# Patient Record
Sex: FEMALE | Race: WHITE | NOT HISPANIC OR LATINO | Employment: OTHER | URBAN - METROPOLITAN AREA
[De-identification: names, ages, dates, MRNs, and addresses within clinical notes are randomized per-mention and may not be internally consistent; named-entity substitution may affect disease eponyms.]

---

## 2017-01-24 ENCOUNTER — PATIENT OUTREACH (OUTPATIENT)
Dept: ADMINISTRATIVE | Facility: HOSPITAL | Age: 52
End: 2017-01-24

## 2017-01-24 NOTE — PROGRESS NOTES
> 1 year since last ov.    Due for an office visit with her, your cholesterol labs, mammogram, pap smear, colon cancer screening, and possibly flu and tetanus immunizations

## 2017-01-24 NOTE — LETTER
February 1, 2017    Glendy Rays  [address] [place] [address] [address] [place] [address]             [facility]  [address]  [place] [address]  Phone: [phone] Dear Mrs. Andrade:    We have tried to reach you by mychart unsuccessfully.    [facility] is committed to your overall health.  To help you get the most out of each of your visits, we will review your information to make sure you are up to date on all of your recommended tests and/or procedures.  We have [facility] your primary care provider.     If Mrs. Valenzuela is no longer your primary care provider, please contact me so that we may update our records accordingly.       She has found that you may be due for an office visit with her, your cholesterol labs, mammogram, pap smear, colon cancer screening, and possibly flu and tetanus immunizations .     If you have had any of the above done at an outside facility, please let us know so I can update your record.  If you have a copy of these records, please provide a copy for us to scan into your chart.  If not, please provide that provider/facilities contact information so that we may obtain copies from that facility.     Otherwise, please schedule these appointments at your earliest convenience.     If you have any questions or concerns, please don't hesitate to call.    Thank you for letting us care for you,  Noemi Blackburn LPN Clinical Care Coordinator  [facility]  [phone]

## 2018-03-19 ENCOUNTER — OFFICE VISIT (OUTPATIENT)
Dept: FAMILY MEDICINE | Facility: CLINIC | Age: 53
End: 2018-03-19
Payer: COMMERCIAL

## 2018-03-19 VITALS
WEIGHT: 134.06 LBS | HEART RATE: 72 BPM | TEMPERATURE: 98 F | BODY MASS INDEX: 22.89 KG/M2 | HEIGHT: 64 IN | DIASTOLIC BLOOD PRESSURE: 70 MMHG | RESPIRATION RATE: 12 BRPM | SYSTOLIC BLOOD PRESSURE: 120 MMHG

## 2018-03-19 DIAGNOSIS — Z23 IMMUNIZATION DUE: ICD-10-CM

## 2018-03-19 DIAGNOSIS — Z12.4 PAP SMEAR FOR CERVICAL CANCER SCREENING: ICD-10-CM

## 2018-03-19 DIAGNOSIS — Z12.11 COLON CANCER SCREENING: ICD-10-CM

## 2018-03-19 DIAGNOSIS — Z12.39 BREAST CANCER SCREENING: ICD-10-CM

## 2018-03-19 DIAGNOSIS — R10.9 FLANK PAIN: ICD-10-CM

## 2018-03-19 DIAGNOSIS — Z01.419 WELL WOMAN EXAM WITH ROUTINE GYNECOLOGICAL EXAM: Primary | ICD-10-CM

## 2018-03-19 DIAGNOSIS — Z00.00 LABORATORY EXAM ORDERED AS PART OF ROUTINE GENERAL MEDICAL EXAMINATION: ICD-10-CM

## 2018-03-19 LAB
BILIRUB SERPL-MCNC: ABNORMAL MG/DL
BLOOD URINE, POC: 250
COLOR, POC UA: YELLOW
GLUCOSE UR QL STRIP: NORMAL
KETONES UR QL STRIP: ABNORMAL
LEUKOCYTE ESTERASE URINE, POC: ABNORMAL
NITRITE, POC UA: ABNORMAL
PH, POC UA: 9
PROTEIN, POC: ABNORMAL
SPECIFIC GRAVITY, POC UA: ABNORMAL
UROBILINOGEN, POC UA: 1

## 2018-03-19 PROCEDURE — 87624 HPV HI-RISK TYP POOLED RSLT: CPT

## 2018-03-19 PROCEDURE — 81002 URINALYSIS NONAUTO W/O SCOPE: CPT | Mod: S$GLB,,, | Performed by: NURSE PRACTITIONER

## 2018-03-19 PROCEDURE — 88175 CYTOPATH C/V AUTO FLUID REDO: CPT

## 2018-03-19 PROCEDURE — 90715 TDAP VACCINE 7 YRS/> IM: CPT | Mod: S$GLB,,, | Performed by: INTERNAL MEDICINE

## 2018-03-19 PROCEDURE — 99396 PREV VISIT EST AGE 40-64: CPT | Mod: 25,S$GLB,, | Performed by: NURSE PRACTITIONER

## 2018-03-19 PROCEDURE — 90471 IMMUNIZATION ADMIN: CPT | Mod: S$GLB,,, | Performed by: INTERNAL MEDICINE

## 2018-03-19 RX ORDER — DEXTROAMPHETAMINE SACCHARATE, AMPHETAMINE ASPARTATE, DEXTROAMPHETAMINE SULFATE AND AMPHETAMINE SULFATE 3.75; 3.75; 3.75; 3.75 MG/1; MG/1; MG/1; MG/1
15 TABLET ORAL 2 TIMES DAILY
Refills: 0 | COMMUNITY
Start: 2018-01-26 | End: 2019-06-03 | Stop reason: ALTCHOICE

## 2018-03-19 RX ORDER — SERTRALINE HYDROCHLORIDE 25 MG/1
25 TABLET, FILM COATED ORAL DAILY
Refills: 2 | COMMUNITY
Start: 2018-01-19 | End: 2019-05-28

## 2018-03-19 NOTE — PROGRESS NOTES
"Subjective:       Patient ID: Glendy Andrade is a 52 y.o. female.    Chief Complaint: Gynecologic Exam    HPI here for well woman exam. Last one was in 2013 with normal results. She denies any concerns today. She is due for mammogram. She has not had labs done since 2015. She has not had a colonoscopy done. She is having some mild left sided flank soreness. She states worse when she first gets up in the morning. She denies any dysuria, urgency or frequency. States she has not been hydrating well lately and feels that could be the issue. Denies increased pain with movement. See ROS.    The following portion of the patients history was reviewed and updated as appropriate: allergies, current medications, past medical and surgical history. Past social history and problem list reviewed. Family PMH and Past social history reviewed. Tobacco, Illicit drug use reviewed.     Review of Systems   Constitutional: No fatigue. No fever or night sweats.  HENT:   Head: Normocephalic.   Eyes: No vision changes.  Neck: No fullness or difficulty swallowing  Cardiovascular: No CP, palpitations.    Pulmonary/Chest:No SOB or wheezing   Abdominal: No N/V/D. No abdominal pain.    Pelvic:No lesions. No pain with intercourse. No abnormal bleeding. No discharge or odor.  Breasts: no skin changes. No masses or lesions. No nipple induration or discharge  Musculoskeletal: No joint pain or swelling. No change in gait. Left flank soreness.  Neurological: No dizziness. No headache.    Skin: No rashes or lesions   Psychiatric: No sadness, thoughts of suicide.      Objective:     /70   Pulse 72   Temp 98.3 °F (36.8 °C) (Oral)   Resp 12   Ht 5' 4" (1.626 m)   Wt 60.8 kg (134 lb 0.6 oz)   BMI 23.01 kg/m²      Physical Exam   Abdominal: Hernia confirmed negative in the right inguinal area and confirmed negative in the left inguinal area.   Genitourinary: Pelvic exam was performed with patient supine. There is no rash, tenderness or lesion on " the right labia. There is no rash, tenderness or lesion on the left labia. Uterus is not deviated, not enlarged, not fixed and not tender. Cervix exhibits no motion tenderness, no discharge and no friability. Right adnexum displays no mass, no tenderness and no fullness. Left adnexum displays no mass, no tenderness and no fullness. No erythema, tenderness or bleeding in the vagina. No vaginal discharge found.   Lymphadenopathy:        Right: No inguinal adenopathy present.        Left: No inguinal adenopathy present.       Constitutional: oriented to person, place, and time. well-developed and well-nourished.   Neck: Normal range of motion. Neck supple. No tracheal deviation present. No thyromegaly present.   Cardiovascular: Normal rate, regular rhythm and normal heart sounds.    Pulmonary/Chest: Effort normal and breath sounds normal. No respiratory distress. No wheezes.   Abdominal: Soft. Bowel sounds are normal. No distension. There is no tenderness.   Musculoskeletal: Normal range of motion. Gait and coordination normal. No pain with percussion over flank area.  Breasts: symmetrical. No masses or lesions. No nipple induration or discharge.   Neurological: oriented to person, place, and time.   Skin: Skin is warm and dry. No rashes or lesions  Psychiatric: Normal mood and affect.Behavior is normal. Judgment and thought content normal.     Assessment:       1. Well woman exam with routine gynecological exam    2. Flank pain    3. Pap smear for cervical cancer screening    4. Breast cancer screening    5. Laboratory exam ordered as part of routine general medical examination    6. Colon cancer screening    7. Immunization due        Plan:         Glendy was seen today for gynecologic exam.    Diagnoses and all orders for this visit:    Well woman exam with routine gynecological exam    Flank pain: she will hydrate well. If not improving will follow up.  -     POCT    Results for orders placed or performed in visit  on 03/19/18   POCT urine dipstick without microscope   Result Value Ref Range    Color, UA yellow     Spec Grav UA      pH, UA 9     WBC, UA neg     Nitrite, UA neg     Protein neg     Glucose, UA normal     Ketones, UA trace     Urobilinogen, UA 1     Bilirubin +     Blood,     urine dipstick without microscope    Pap smear for cervical cancer screening  -     Liquid-based pap smear, screening  -     HPV High Risk Genotypes, PCR    Breast cancer screening: due for routine screening.   -     Mammo Digital Screening Bilat with CAD; Future    Laboratory exam ordered as part of routine general medical examination  -     CBC auto differential; Future  -     Comprehensive metabolic panel; Future  -     Lipid panel; Future    Colon cancer screening  -     Case request GI: COLONOSCOPY    Immunization due  -     Tdap Vaccine    Continue current medication  Take medications only as prescribed  Healthy diet, exercise  Adequate rest  Adequate hydration  Avoid allergens  Avoid excessive caffeine

## 2018-03-23 LAB
HPV16 AG SPEC QL: NEGATIVE
HPV16+18+H RISK 12 DNA CVX-IMP: POSITIVE
HPV18 DNA SPEC QL NAA+PROBE: NEGATIVE

## 2018-03-26 ENCOUNTER — LAB VISIT (OUTPATIENT)
Dept: LAB | Facility: HOSPITAL | Age: 53
End: 2018-03-26
Payer: COMMERCIAL

## 2018-03-26 DIAGNOSIS — Z00.00 LABORATORY EXAM ORDERED AS PART OF ROUTINE GENERAL MEDICAL EXAMINATION: ICD-10-CM

## 2018-03-26 LAB
ALBUMIN SERPL BCP-MCNC: 3.9 G/DL
ALP SERPL-CCNC: 71 U/L
ALT SERPL W/O P-5'-P-CCNC: 22 U/L
ANION GAP SERPL CALC-SCNC: 7 MMOL/L
AST SERPL-CCNC: 24 U/L
BASOPHILS # BLD AUTO: 0.04 K/UL
BASOPHILS NFR BLD: 0.8 %
BILIRUB SERPL-MCNC: 0.6 MG/DL
BUN SERPL-MCNC: 16 MG/DL
CALCIUM SERPL-MCNC: 9.6 MG/DL
CHLORIDE SERPL-SCNC: 102 MMOL/L
CHOLEST SERPL-MCNC: 238 MG/DL
CHOLEST/HDLC SERPL: 2.5 {RATIO}
CO2 SERPL-SCNC: 30 MMOL/L
CREAT SERPL-MCNC: 0.8 MG/DL
DIFFERENTIAL METHOD: ABNORMAL
EOSINOPHIL # BLD AUTO: 0.1 K/UL
EOSINOPHIL NFR BLD: 2.5 %
ERYTHROCYTE [DISTWIDTH] IN BLOOD BY AUTOMATED COUNT: 13 %
EST. GFR  (AFRICAN AMERICAN): >60 ML/MIN/1.73 M^2
EST. GFR  (NON AFRICAN AMERICAN): >60 ML/MIN/1.73 M^2
GLUCOSE SERPL-MCNC: 89 MG/DL
HCT VFR BLD AUTO: 42.5 %
HDLC SERPL-MCNC: 95 MG/DL
HDLC SERPL: 39.9 %
HGB BLD-MCNC: 13.9 G/DL
IMM GRANULOCYTES # BLD AUTO: 0.01 K/UL
IMM GRANULOCYTES NFR BLD AUTO: 0.2 %
LDLC SERPL CALC-MCNC: 128.8 MG/DL
LYMPHOCYTES # BLD AUTO: 1.6 K/UL
LYMPHOCYTES NFR BLD: 34 %
MCH RBC QN AUTO: 31.2 PG
MCHC RBC AUTO-ENTMCNC: 32.7 G/DL
MCV RBC AUTO: 96 FL
MONOCYTES # BLD AUTO: 0.5 K/UL
MONOCYTES NFR BLD: 11.2 %
NEUTROPHILS # BLD AUTO: 2.4 K/UL
NEUTROPHILS NFR BLD: 51.3 %
NONHDLC SERPL-MCNC: 143 MG/DL
NRBC BLD-RTO: 0 /100 WBC
PLATELET # BLD AUTO: 248 K/UL
PMV BLD AUTO: 11.1 FL
POTASSIUM SERPL-SCNC: 5 MMOL/L
PROT SERPL-MCNC: 6.9 G/DL
RBC # BLD AUTO: 4.45 M/UL
SODIUM SERPL-SCNC: 139 MMOL/L
TRIGL SERPL-MCNC: 71 MG/DL
WBC # BLD AUTO: 4.74 K/UL

## 2018-03-26 PROCEDURE — 80053 COMPREHEN METABOLIC PANEL: CPT

## 2018-03-26 PROCEDURE — 80061 LIPID PANEL: CPT

## 2018-03-26 PROCEDURE — 85025 COMPLETE CBC W/AUTO DIFF WBC: CPT

## 2018-03-26 PROCEDURE — 36415 COLL VENOUS BLD VENIPUNCTURE: CPT | Mod: PN

## 2018-03-27 ENCOUNTER — PATIENT MESSAGE (OUTPATIENT)
Dept: FAMILY MEDICINE | Facility: CLINIC | Age: 53
End: 2018-03-27

## 2018-03-27 NOTE — TELEPHONE ENCOUNTER
Her HPV testing came back positive for high risk HPV. But her pap was normal. No cancer cells noted. She will need to have her pap done again in ONE year to evaluate for resolution of the HPV virus.

## 2018-03-28 ENCOUNTER — HOSPITAL ENCOUNTER (OUTPATIENT)
Dept: RADIOLOGY | Facility: HOSPITAL | Age: 53
Discharge: HOME | End: 2018-03-28
Attending: NURSE PRACTITIONER
Payer: COMMERCIAL

## 2018-03-28 DIAGNOSIS — Z12.39 BREAST CANCER SCREENING: ICD-10-CM

## 2018-03-28 PROCEDURE — 77067 SCR MAMMO BI INCL CAD: CPT | Mod: 26,,, | Performed by: RADIOLOGY

## 2018-03-28 PROCEDURE — 77067 SCR MAMMO BI INCL CAD: CPT | Mod: TC,PO

## 2018-03-28 PROCEDURE — 77063 BREAST TOMOSYNTHESIS BI: CPT | Mod: 26,,, | Performed by: RADIOLOGY

## 2018-03-29 ENCOUNTER — PATIENT MESSAGE (OUTPATIENT)
Dept: FAMILY MEDICINE | Facility: CLINIC | Age: 53
End: 2018-03-29

## 2018-04-05 ENCOUNTER — PATIENT MESSAGE (OUTPATIENT)
Dept: GASTROENTEROLOGY | Facility: CLINIC | Age: 53
End: 2018-04-05

## 2018-06-05 ENCOUNTER — PATIENT OUTREACH (OUTPATIENT)
Dept: ADMINISTRATIVE | Facility: HOSPITAL | Age: 53
End: 2018-06-05

## 2019-05-28 ENCOUNTER — OFFICE VISIT (OUTPATIENT)
Dept: FAMILY MEDICINE | Facility: CLINIC | Age: 54
End: 2019-05-28
Payer: COMMERCIAL

## 2019-05-28 VITALS
TEMPERATURE: 98 F | RESPIRATION RATE: 18 BRPM | WEIGHT: 131.19 LBS | HEART RATE: 64 BPM | OXYGEN SATURATION: 98 % | SYSTOLIC BLOOD PRESSURE: 104 MMHG | HEIGHT: 64 IN | BODY MASS INDEX: 22.4 KG/M2 | DIASTOLIC BLOOD PRESSURE: 60 MMHG

## 2019-05-28 DIAGNOSIS — Z12.39 BREAST CANCER SCREENING: ICD-10-CM

## 2019-05-28 DIAGNOSIS — Z01.419 WELL WOMAN EXAM WITH ROUTINE GYNECOLOGICAL EXAM: Primary | ICD-10-CM

## 2019-05-28 DIAGNOSIS — Z00.00 LABORATORY EXAM ORDERED AS PART OF ROUTINE GENERAL MEDICAL EXAMINATION: ICD-10-CM

## 2019-05-28 DIAGNOSIS — B97.7 HIGH RISK HPV INFECTION: ICD-10-CM

## 2019-05-28 DIAGNOSIS — Z12.4 CERVICAL CANCER SCREENING: ICD-10-CM

## 2019-05-28 PROCEDURE — 99396 PREV VISIT EST AGE 40-64: CPT | Mod: S$GLB,,, | Performed by: NURSE PRACTITIONER

## 2019-05-28 PROCEDURE — 88175 CYTOPATH C/V AUTO FLUID REDO: CPT

## 2019-05-28 PROCEDURE — 99396 PR PREVENTIVE VISIT,EST,40-64: ICD-10-PCS | Mod: S$GLB,,, | Performed by: NURSE PRACTITIONER

## 2019-05-28 PROCEDURE — 87624 HPV HI-RISK TYP POOLED RSLT: CPT

## 2019-05-28 NOTE — PROGRESS NOTES
Subjective:       Patient ID: Glendy Andrade is a 53 y.o. female.    Chief Complaint: Annual Exam (Physical)    HPI here for annual exam. Her last pap was a year ago which was normal except her HPV came back high risk. Will need to repeat that today. Discussed the concerns related to HPV. She is due for mammogram and labs. Has been under more stress lately. Her father passed away and her mother and both her inlaws have moved in with them. They had to sell their house and get a bigger one. She has not worked in about 4 months due to this so she has not been taking any adderall. Only takes this when she works. She has some issues with sleep, but melatonin usually helps, if not will take a Tylenol PM. She denies any other concerns. See ROS.    The following portion of the patients history was reviewed and updated as appropriate: allergies, current medications, past medical and surgical history. Past social history and problem list reviewed. Family PMH and Past social history reviewed. Tobacco, Illicit drug use reviewed.     Review of Systems   Constitutional: Negative for appetite change, fatigue and fever.   HENT: Negative for congestion and voice change.    Eyes: Negative for visual disturbance.   Respiratory: Negative for cough, chest tightness, shortness of breath and wheezing.    Cardiovascular: Negative for chest pain, palpitations and leg swelling.   Gastrointestinal: Negative for abdominal pain, blood in stool, diarrhea, nausea and vomiting.   Genitourinary: Negative for difficulty urinating, dysuria, pelvic pain, vaginal bleeding, vaginal discharge and vaginal pain.   Musculoskeletal: Negative for arthralgias, back pain, gait problem and myalgias.   Skin: Negative for rash and wound.   Allergic/Immunologic: Negative for environmental allergies and food allergies.   Neurological: Negative for dizziness, weakness and headaches.   Hematological: Negative for adenopathy. Does not bruise/bleed easily.  "  Psychiatric/Behavioral: Positive for sleep disturbance (sometimes. take melatonin or tylenol PM and that helps.). Negative for decreased concentration and dysphoric mood. The patient is not nervous/anxious.        Objective:     /60   Pulse 64   Temp 98.1 °F (36.7 °C) (Oral)   Resp 18   Ht 5' 4" (1.626 m)   Wt 59.5 kg (131 lb 3.2 oz)   SpO2 98%   BMI 22.52 kg/m²      Physical Exam   Constitutional: She is oriented to person, place, and time. She appears well-developed and well-nourished. No distress.   HENT:   Head: Normocephalic.   Eyes: Pupils are equal, round, and reactive to light. Conjunctivae and EOM are normal.   Neck: Trachea normal and normal range of motion. Neck supple. No tracheal tenderness present. No tracheal deviation and no edema present. No thyromegaly present.   Cardiovascular: Regular rhythm and normal heart sounds. Exam reveals no gallop.   No murmur heard.  Pulmonary/Chest: Breath sounds normal. No stridor. No respiratory distress. She has no wheezes. She has no rhonchi. She has no rales.   Abdominal: Soft. Normal appearance and bowel sounds are normal. She exhibits no mass. There is no splenomegaly. There is no tenderness. There is no rebound. Hernia confirmed negative in the right inguinal area and confirmed negative in the left inguinal area.   Genitourinary: Vagina normal. There is no rash, tenderness or lesion on the right labia. There is no rash, tenderness or lesion on the left labia. Uterus is not deviated, not enlarged, not fixed and not tender. Cervix exhibits no motion tenderness, no discharge and no friability. Right adnexum displays no mass, no tenderness and no fullness. Left adnexum displays no mass, no tenderness and no fullness. No erythema or tenderness in the vagina. No vaginal discharge found.   Musculoskeletal: Normal range of motion.   Gait and coordination normal   Lymphadenopathy: No inguinal adenopathy noted on the right or left side.   Neurological: She " is alert and oriented to person, place, and time. She has normal strength.   Skin: Skin is warm and dry. Capillary refill takes less than 2 seconds. No lesion and no rash noted.   Psychiatric: She has a normal mood and affect. Her speech is normal and behavior is normal. Judgment and thought content normal. Cognition and memory are normal.       Assessment:       1. Well woman exam with routine gynecological exam    2. Laboratory exam ordered as part of routine general medical examination    3. Cervical cancer screening    4. Breast cancer screening    5. High risk HPV infection        Plan:         Glendy was seen today for annual exam.    Diagnoses and all orders for this visit:    Well woman exam with routine gynecological exam    Laboratory exam ordered as part of routine general medical examination: due for routine labs.  -     CBC auto differential; Future  -     Comprehensive metabolic panel; Future  -     Lipid panel; Future    Cervical cancer screening: pap done.  -     Liquid-based pap smear, screening  -     HPV High Risk Genotypes, PCR    Breast cancer screening  -     Mammo Digital Screening Bilat; Future    High risk HPV infection: if this sample comes back again as positive for HPV, will need colposcopy by Gynecology.       Healthy diet, exercise  Adequate rest  Adequate hydration  Avoid allergens  Avoid excessive caffeine

## 2019-06-03 ENCOUNTER — OFFICE VISIT (OUTPATIENT)
Dept: SPINE | Facility: CLINIC | Age: 54
End: 2019-06-03
Payer: COMMERCIAL

## 2019-06-03 ENCOUNTER — HOSPITAL ENCOUNTER (OUTPATIENT)
Dept: RADIOLOGY | Facility: HOSPITAL | Age: 54
Discharge: HOME | End: 2019-06-03
Attending: PHYSICIAN ASSISTANT
Payer: COMMERCIAL

## 2019-06-03 VITALS
DIASTOLIC BLOOD PRESSURE: 69 MMHG | BODY MASS INDEX: 22.4 KG/M2 | HEIGHT: 64 IN | HEART RATE: 79 BPM | WEIGHT: 131.19 LBS | SYSTOLIC BLOOD PRESSURE: 120 MMHG | TEMPERATURE: 98 F | RESPIRATION RATE: 18 BRPM

## 2019-06-03 DIAGNOSIS — M54.2 CERVICALGIA: Primary | ICD-10-CM

## 2019-06-03 DIAGNOSIS — M54.2 CERVICALGIA: ICD-10-CM

## 2019-06-03 DIAGNOSIS — M47.812 SPONDYLOSIS OF CERVICAL REGION WITHOUT MYELOPATHY OR RADICULOPATHY: ICD-10-CM

## 2019-06-03 PROCEDURE — 72052 X-RAY EXAM NECK SPINE 6/>VWS: CPT | Mod: 26,,, | Performed by: RADIOLOGY

## 2019-06-03 PROCEDURE — 99999 PR PBB SHADOW E&M-EST. PATIENT-LVL III: ICD-10-PCS | Mod: PBBFAC,,, | Performed by: PHYSICIAN ASSISTANT

## 2019-06-03 PROCEDURE — 99203 OFFICE O/P NEW LOW 30 MIN: CPT | Mod: S$GLB,,, | Performed by: PHYSICIAN ASSISTANT

## 2019-06-03 PROCEDURE — 72052 X-RAY EXAM NECK SPINE 6/>VWS: CPT | Mod: TC,PO

## 2019-06-03 PROCEDURE — 3008F PR BODY MASS INDEX (BMI) DOCUMENTED: ICD-10-PCS | Mod: CPTII,S$GLB,, | Performed by: PHYSICIAN ASSISTANT

## 2019-06-03 PROCEDURE — 72052 XR CERVICAL SPINE 5 VIEW WITH FLEX AND EXT: ICD-10-PCS | Mod: 26,,, | Performed by: RADIOLOGY

## 2019-06-03 PROCEDURE — 3008F BODY MASS INDEX DOCD: CPT | Mod: CPTII,S$GLB,, | Performed by: PHYSICIAN ASSISTANT

## 2019-06-03 PROCEDURE — 99999 PR PBB SHADOW E&M-EST. PATIENT-LVL III: CPT | Mod: PBBFAC,,, | Performed by: PHYSICIAN ASSISTANT

## 2019-06-03 PROCEDURE — 99203 PR OFFICE/OUTPT VISIT, NEW, LEVL III, 30-44 MIN: ICD-10-PCS | Mod: S$GLB,,, | Performed by: PHYSICIAN ASSISTANT

## 2019-06-03 NOTE — LETTER
Elaine 3, 2019      Marilyn Valenzuela, AMERICO  [address] [facility] [address]  [address]  [facility] [address]           [facility]  [address] [facility] [address]  [facility] [address]  Phone: [phone]  Fax: [phone]          Patient: Glendy Andrade   MR Number: [id number]   YOB: 1965   Date of Visit: 6/3/2019       Dear Marilyn Valenzuela:    Thank you for referring Glendy Andrade to me for evaluation. Attached you will find relevant portions of my assessment and plan of care.    If you have questions, please do not hesitate to call me. I look forward to following Glendy Andrade along with you.    Sincerely,    Cortney Beauchamp PA-C    Enclosure  CC:  No Recipients    If you would like to receive this communication electronically, please contact [email or web address] or [phone] to request more information on [employer] Link access.    For providers and/or their staff who would like to refer a patient to [facility], please contact us through our one-stop-shop provider referral line, [facility], at [phone].    If you feel you have received this communication in error or would no longer like to receive these types of communications, please e-mail [email or web address]

## 2019-06-03 NOTE — PROGRESS NOTES
Back and Spine History & Physical    Patient ID: Glendy Andrade is a 53 y.o. female.    Chief Complaint   Patient presents with    Establish Care    Neck Pain       Review of Systems   Constitutional: Negative for activity change, appetite change, chills, fever and unexpected weight change.   HENT: Negative for tinnitus, trouble swallowing and voice change.    Respiratory: Negative for apnea, cough, chest tightness and shortness of breath.    Cardiovascular: Negative for chest pain and palpitations.   Gastrointestinal: Negative for constipation, diarrhea, nausea and vomiting.   Genitourinary: Negative for difficulty urinating, dysuria, frequency and urgency.   Musculoskeletal: Positive for neck pain. Negative for back pain, gait problem and neck stiffness.   Skin: Negative for wound.   Neurological: Positive for numbness. Negative for dizziness, tremors, seizures, facial asymmetry, speech difficulty, weakness, light-headedness and headaches.   Psychiatric/Behavioral: Negative for confusion and decreased concentration.       Past Medical History:   Diagnosis Date    ADD (attention deficit disorder)     Depression     High risk HPV infection     Hyperlipidemia      Social History     Socioeconomic History    Marital status:      Spouse name: Not on file    Number of children: Not on file    Years of education: Not on file    Highest education level: Not on file   Occupational History    Not on file   Social Needs    Financial resource strain: Not on file    Food insecurity:     Worry: Not on file     Inability: Not on file    Transportation needs:     Medical: Not on file     Non-medical: Not on file   Tobacco Use    Smoking status: Former Smoker     Last attempt to quit: 3/19/2016     Years since quitting: 3.2    Smokeless tobacco: Never Used   Substance and Sexual Activity    Alcohol use: Yes     Alcohol/week: 4.2 oz     Types: 7 Glasses of wine per week    Drug use: No    Sexual activity:  "Yes     Partners: Male     Birth control/protection: Post-menopausal, None   Lifestyle    Physical activity:     Days per week: Not on file     Minutes per session: Not on file    Stress: Not on file   Relationships    Social connections:     Talks on phone: Not on file     Gets together: Not on file     Attends [employer] service: Not on file     Active member of club or organization: Not on file     Attends meetings of clubs or organizations: Not on file     Relationship status: Not on file   Other Topics Concern    Not on file   Social History Narrative    Not on file     Family History   Problem Relation Age of Onset    Hypertension Father     Arthritis Maternal Grandmother     Diabetes Maternal Grandmother     Diabetes Paternal Grandmother      Review of patient's allergies indicates:  No Known Allergies  No current outpatient medications on file.    Vitals:    06/03/19 1442   BP: 120/69   Pulse: 79   Resp: 18   Temp: 97.6 °F (36.4 °C)   TempSrc: Oral   Weight: 59.5 kg (131 lb 2.8 oz)   Height: 5' 4" (1.626 m)       Physical Exam   Constitutional: She is oriented to person, place, and time. She appears well-developed and well-nourished.   HENT:   Head: Normocephalic and atraumatic.   Eyes: Pupils are equal, round, and reactive to light.   Neck: Normal range of motion. Neck supple.   Cardiovascular: Normal rate.   Pulmonary/Chest: Effort normal.   Musculoskeletal: Normal range of motion. She exhibits no edema.   Neurological: She is alert and oriented to person, place, and time. She has a normal Finger-Nose-Finger Test, a normal Heel to Shin Test, a normal Romberg Test and a normal Tandem Gait Test. Gait normal.   Reflex Scores:       Tricep reflexes are 2+ on the right side and 2+ on the left side.       Bicep reflexes are 2+ on the right side and 2+ on the left side.       Brachioradialis reflexes are 2+ on the right side and 2+ on the left side.       Patellar reflexes are 2+ on the right side and 2+ " on the left side.       Achilles reflexes are 2+ on the right side and 2+ on the left side.  Skin: Skin is warm, dry and intact.   Psychiatric: She has a normal mood and affect. Her speech is normal and behavior is normal. Judgment and thought content normal.   Nursing note and vitals reviewed.      Neurologic Exam     Mental Status   Oriented to person, place, and time.   Oriented to person.   Oriented to place.   Oriented to time.   Follows 3 step commands.   Attention: normal. Concentration: normal.   Speech: speech is normal   Level of consciousness: alert  Knowledge: consistent with education.   Able to name object. Able to read. Able to repeat. Able to write. Normal comprehension.     Cranial Nerves     CN II   Visual acuity: normal  Right visual field deficit: none  Left visual field deficit: none     CN III, IV, VI   Pupils are equal, round, and reactive to light.  Right pupil: Size: 3 mm. Shape: regular. Reactivity: brisk. Consensual response: intact.   Left pupil: Size: 3 mm. Shape: regular. Reactivity: brisk. Consensual response: intact.   CN III: no CN III palsy  CN VI: no CN VI palsy  Nystagmus: none   Diplopia: none  Ophthalmoparesis: none  Conjugate gaze: present    CN V   Right facial sensation deficit: none  Left facial sensation deficit: none    CN VII   Right facial weakness: none  Left facial weakness: none    CN VIII   Hearing: intact    CN IX, X   CN IX normal.   CN X normal.     CN XI   Right sternocleidomastoid strength: normal  Left sternocleidomastoid strength: normal  Right trapezius strength: normal  Left trapezius strength: normal    CN XII   Fasciculations: absent  Tongue deviation: none    Motor Exam   Muscle bulk: normal  Overall muscle tone: normal  Right arm pronator drift: absent  Left arm pronator drift: absent    Strength   Right neck flexion: 5/5  Left neck flexion: 5/5  Right neck extension: 5/5  Left neck extension: 5/5  Right deltoid: 5/5  Left deltoid: 5/5  Right biceps:  5/5  Left biceps: 5/5  Right triceps: 5/5  Left triceps: 5/5  Right wrist flexion: 5/5  Left wrist flexion: 5/5  Right wrist extension: 5/5  Left wrist extension: 5/5  Right interossei: 5/5  Left interossei: 5/5  Right abdominals: 5/5  Left abdominals: 5/5  Right iliopsoas: 5/  Left iliopsoas: 5  Right quadriceps: 5/5  Left quadriceps: 5/  Right hamstrin/  Left hamstrin  Right glutei: 5/  Left glutei: 5  Right anterior tibial: 5  Left anterior tibial:   Right posterior tibial:   Left posterior tibial:   Right peroneal:   Left peroneal:   Right gastroc: 5  Left gastroc:     Sensory Exam   Right arm light touch: normal  Left arm light touch: normal  Right leg light touch: normal  Left leg light touch: normal  Right arm vibration: normal  Left arm vibration: normal  Right arm pinprick: normal  Left arm pinprick: normal    Gait, Coordination, and Reflexes     Gait  Gait: normal    Coordination   Romberg: negative  Finger to nose coordination: normal  Heel to shin coordination: normal  Tandem walking coordination: normal    Tremor   Resting tremor: absent  Intention tremor: absent  Action tremor: absent    Reflexes   Right brachioradialis: 2+  Left brachioradialis: 2+  Right biceps: 2+  Left biceps: 2+  Right triceps: 2+  Left triceps: 2+  Right patellar: 2+  Left patellar: 2+  Right achilles: 2+  Left achilles: 2+  Right Burton: absent  Left Burton: absent  Right ankle clonus: absent  Left ankle clonus: absent      Provider dictation:  53 year old female with ADD and depression is self referred for evaluation of neck pain and arthritis.  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  Her mother and your sister have had cervical spine surgery.  She describes progressively increasing posterior neck pain radiating to the interscapular area to the shoulders and head triggering head aches.  She denies radicular symptoms into the arms, but has isolated numbness  in the right hand while doing a lot of work and upon waking in the morning .  She will occasionally take motrin for pain and uses a home tens unit.    NDI score: 24%.  PHQ:  5.    On exam, she sits with her neck slightly flexed forward.  She has a prominent hump on the posterior neck.  She has 5/5 strength with no sensory deficits.  2+ muscle stretch reflexes.    I have reviewed xrays of the cervical spine from 11-4-13 demonstrating disk height loss at C4/5, C5/6, C6/7.  Significant anterior vertebral endplate osteophytes are noted.  There is no evidwence of fracture.  Straightening of the cervical lordosis is seen.     Ms. Andrade has chronic neck pain and significant degenerative changes.  Neck pain can be do myofascial pain from straightening and referred from spondylosis.   I am referring her to PT for neck pain.  Right hand numbness more likely carpal tunnel than radiculopathy and she will start using wrist splints she has at home already.  Follow up if no improvement with PT and splinting.  We will also obtain updated neck xrays today to compare level of spondylosis now to 2013.    Visit Diagnosis:  Cervicalgia  -     X-Ray Cervical Spine 5 View W Flex Extxt; Future; Expected date: 06/03/2019    Spondylosis of cervical region without myelopathy or radiculopathy  -     X-Ray Cervical Spine 5 View W Flex Extxt; Future; Expected date: 06/03/2019        Total time spent counseling greater than fifty percent of total visit time.  Counseling included discussion regarding imaging findings, diagnosis possibilities, treatment options, risks and benefits.   The patient had many questions regarding the options and long-term effects.     Pattern 1

## 2019-06-04 ENCOUNTER — PATIENT MESSAGE (OUTPATIENT)
Dept: FAMILY MEDICINE | Facility: CLINIC | Age: 54
End: 2019-06-04

## 2019-06-04 LAB
HPV HR 12 DNA CVX QL NAA+PROBE: NEGATIVE
HPV16 AG SPEC QL: NEGATIVE
HPV18 DNA SPEC QL NAA+PROBE: NEGATIVE

## 2019-06-11 ENCOUNTER — HOSPITAL ENCOUNTER (OUTPATIENT)
Dept: RADIOLOGY | Facility: HOSPITAL | Age: 54
Discharge: HOME | End: 2019-06-11
Attending: NURSE PRACTITIONER
Payer: COMMERCIAL

## 2019-06-11 DIAGNOSIS — Z12.39 BREAST CANCER SCREENING: ICD-10-CM

## 2019-06-11 PROCEDURE — 77063 BREAST TOMOSYNTHESIS BI: CPT | Mod: 26,,, | Performed by: RADIOLOGY

## 2019-06-11 PROCEDURE — 77067 MAMMO DIGITAL SCREENING BILAT WITH TOMOSYNTHESIS_CAD: ICD-10-PCS | Mod: 26,,, | Performed by: RADIOLOGY

## 2019-06-11 PROCEDURE — 77063 MAMMO DIGITAL SCREENING BILAT WITH TOMOSYNTHESIS_CAD: ICD-10-PCS | Mod: 26,,, | Performed by: RADIOLOGY

## 2019-06-11 PROCEDURE — 77067 SCR MAMMO BI INCL CAD: CPT | Mod: 26,,, | Performed by: RADIOLOGY

## 2019-06-11 PROCEDURE — 77067 SCR MAMMO BI INCL CAD: CPT | Mod: TC,PO

## 2020-03-11 ENCOUNTER — TELEPHONE (OUTPATIENT)
Dept: FAMILY MEDICINE | Facility: CLINIC | Age: 55
End: 2020-03-11

## 2020-03-11 NOTE — TELEPHONE ENCOUNTER
No show letter #1 mailed to pt.  Also, called pt and left her a message to please return my call at [phone] to reschedule missed appt from today with Marilyn Valenzuela NP.

## 2020-06-22 ENCOUNTER — TELEPHONE (OUTPATIENT)
Dept: RHEUMATOLOGY | Facility: CLINIC | Age: 55
End: 2020-06-22

## 2020-06-22 NOTE — TELEPHONE ENCOUNTER
----- Message from Nori Sutton sent at 6/22/2020  8:59 AM CDT -----  Type:  Sooner Apoointment Request    Caller is requesting a sooner appointment.  Caller declined first available appointment listed below.  Caller will not accept being placed on the waitlist and is requesting a message be sent to doctor.    Name of Caller:  self   When is the first available appointment?   Symptoms:   Best Call Back Number: [phone]   Additional Information:  Patient requesting to schedule a new patient appointment for spurs on the spine-[place]

## 2020-06-22 NOTE — TELEPHONE ENCOUNTER
Returned patient call regarding new patient appointment. Patient stated was told by eye doctor that MRI showed issues with Spine possible DISH. Nurse informed of first available with both providers. Nurse offered to provide phone numbers to other [facility] facilities. Patient stated will take first available. Nurse scheduled appointment with Dr Estrella and added to wait list. Patient aware of date and time of appointment.

## 2020-06-26 ENCOUNTER — TELEPHONE (OUTPATIENT)
Dept: FAMILY MEDICINE | Facility: CLINIC | Age: 55
End: 2020-06-26

## 2020-06-26 DIAGNOSIS — Z20.822 CLOSE EXPOSURE TO COVID-19 VIRUS: Primary | ICD-10-CM

## 2020-06-26 NOTE — TELEPHONE ENCOUNTER
Patient requesting COVID testing due to close exposure to her COVID positive mother. Please advise. Patient is currently quarantining and states she has been coughing and had changes in taste.

## 2020-06-26 NOTE — TELEPHONE ENCOUNTER
I put in the order. She has to go to [facility] [address]  [facility] to [facility].  They will swab her. Wear a mask.

## 2020-06-26 NOTE — TELEPHONE ENCOUNTER
----- Message from Venita Natarajan sent at 6/26/2020  8:11 AM CDT -----  Contact: Patient  Type: Needs Medical Advice  Who Called:  Patient  Best Call Back Number:   Additional Information: Calling to find out if she can get orders for covid test.

## 2020-07-10 ENCOUNTER — OFFICE VISIT (OUTPATIENT)
Dept: URGENT CARE | Facility: CLINIC | Age: 55
End: 2020-07-10
Payer: COMMERCIAL

## 2020-07-10 VITALS
DIASTOLIC BLOOD PRESSURE: 72 MMHG | HEART RATE: 86 BPM | RESPIRATION RATE: 18 BRPM | TEMPERATURE: 98 F | HEIGHT: 64 IN | WEIGHT: 131.19 LBS | OXYGEN SATURATION: 98 % | BODY MASS INDEX: 22.4 KG/M2 | SYSTOLIC BLOOD PRESSURE: 116 MMHG

## 2020-07-10 DIAGNOSIS — Z01.84 ENCOUNTER FOR ANTIBODY RESPONSE EXAMINATION: Primary | ICD-10-CM

## 2020-07-10 LAB — SARS-COV-2 IGG SERPLBLD QL IA.RAPID: NEGATIVE

## 2020-07-10 PROCEDURE — 99211 OFF/OP EST MAY X REQ PHY/QHP: CPT | Mod: S$GLB,,, | Performed by: STUDENT IN AN ORGANIZED HEALTH CARE EDUCATION/TRAINING PROGRAM

## 2020-07-10 PROCEDURE — 86769 SARS-COV-2 COVID-19 ANTIBODY: CPT

## 2020-07-10 PROCEDURE — 99211 PR OFFICE/OUTPT VISIT, EST, LEVL I: ICD-10-PCS | Mod: S$GLB,,, | Performed by: STUDENT IN AN ORGANIZED HEALTH CARE EDUCATION/TRAINING PROGRAM

## 2020-07-10 NOTE — PROGRESS NOTES
"Subjective:       Patient ID: Glendy Andrade is a 54 y.o. female.    Vitals:  height is 5' 4" (1.626 m) and weight is 59.5 kg (131 lb 2.8 oz). Her temperature is 97.5 °F (36.4 °C). Her blood pressure is 116/72 and her pulse is 86. Her respiration is 18.     Chief Complaint: No chief complaint on file.    Pt presents to the clinic for COVID antibody testing. Pt states that she has no symptoms. Had recent exposure 3wks ago when mother tested positive. Pt had nasal swab done after and was negative.      Constitution: Negative for chills, fatigue and fever.   HENT: Negative for congestion and sore throat.    Neck: Negative for painful lymph nodes.   Cardiovascular: Negative for chest pain and leg swelling.   Eyes: Negative for double vision and blurred vision.   Respiratory: Negative for cough and shortness of breath.    Gastrointestinal: Negative for nausea, vomiting and diarrhea.   Genitourinary: Negative for dysuria, frequency, urgency and history of kidney stones.   Musculoskeletal: Negative for joint pain, joint swelling, muscle cramps and muscle ache.   Skin: Negative for color change, pale, rash and bruising.   Allergic/Immunologic: Negative for seasonal allergies.   Neurological: Negative for dizziness, history of vertigo, light-headedness, passing out and headaches.   Hematologic/Lymphatic: Negative for swollen lymph nodes.   Psychiatric/Behavioral: Negative for nervous/anxious, sleep disturbance and depression. The patient is not nervous/anxious.        Objective:      Physical Exam   Nursing note and vitals reviewed.  Exam deferred in setting of COVID pandemic in asymptomatic patient with stable vitals      Assessment:       1. Encounter for antibody response examination        Plan:         Encounter for antibody response examination  -     COVID-19 (SARS CoV-2) IgG Antibody  - counseled patient and answered questions in regards to COVID-19 testing and diagnosis for 5 minutes    Tai Rojas MD/MPH  [facility]  [facility]

## 2020-07-10 NOTE — PATIENT INSTRUCTIONS
COVID antibody testing has been done today  A positive IgG antibody means you have previously had COVID-19 but does not imply immunity or current active infection. You should continue to take precautions as instructed by the CDC and [facility] to prevent infection and return to care for RNA testing if you become symptomatic for COVID.

## 2020-08-28 DIAGNOSIS — Z12.39 BREAST CANCER SCREENING: ICD-10-CM

## 2020-10-05 ENCOUNTER — PATIENT MESSAGE (OUTPATIENT)
Dept: ADMINISTRATIVE | Facility: HOSPITAL | Age: 55
End: 2020-10-05

## 2020-10-15 ENCOUNTER — HOSPITAL ENCOUNTER (OUTPATIENT)
Dept: RADIOLOGY | Facility: HOSPITAL | Age: 55
Discharge: HOME | End: 2020-10-15
Attending: INTERNAL MEDICINE
Payer: COMMERCIAL

## 2020-10-15 ENCOUNTER — OFFICE VISIT (OUTPATIENT)
Dept: RHEUMATOLOGY | Facility: CLINIC | Age: 55
End: 2020-10-15
Payer: COMMERCIAL

## 2020-10-15 VITALS
SYSTOLIC BLOOD PRESSURE: 109 MMHG | HEART RATE: 66 BPM | HEIGHT: 64 IN | WEIGHT: 128.31 LBS | DIASTOLIC BLOOD PRESSURE: 69 MMHG | BODY MASS INDEX: 21.91 KG/M2

## 2020-10-15 DIAGNOSIS — M54.9 DORSALGIA, UNSPECIFIED: ICD-10-CM

## 2020-10-15 DIAGNOSIS — M46.90 INFLAMMATORY SPONDYLOPATHY, UNSPECIFIED SPINAL REGION: ICD-10-CM

## 2020-10-15 DIAGNOSIS — M48.10 DISH (DIFFUSE IDIOPATHIC SKELETAL HYPEROSTOSIS): Primary | ICD-10-CM

## 2020-10-15 DIAGNOSIS — M48.10 DISH (DIFFUSE IDIOPATHIC SKELETAL HYPEROSTOSIS): ICD-10-CM

## 2020-10-15 PROCEDURE — 72110 X-RAY EXAM L-2 SPINE 4/>VWS: CPT | Mod: TC,FY,PO

## 2020-10-15 PROCEDURE — 72110 XR LUMBAR SPINE COMPLETE 5 VIEW: ICD-10-PCS | Mod: 26,,, | Performed by: RADIOLOGY

## 2020-10-15 PROCEDURE — 72070 X-RAY EXAM THORAC SPINE 2VWS: CPT | Mod: TC,FY,PO

## 2020-10-15 PROCEDURE — 72070 X-RAY EXAM THORAC SPINE 2VWS: CPT | Mod: 26,,, | Performed by: RADIOLOGY

## 2020-10-15 PROCEDURE — 99205 OFFICE O/P NEW HI 60 MIN: CPT | Mod: S$GLB,,, | Performed by: INTERNAL MEDICINE

## 2020-10-15 PROCEDURE — 99999 PR PBB SHADOW E&M-EST. PATIENT-LVL IV: CPT | Mod: PBBFAC,,, | Performed by: INTERNAL MEDICINE

## 2020-10-15 PROCEDURE — 3008F BODY MASS INDEX DOCD: CPT | Mod: CPTII,S$GLB,, | Performed by: INTERNAL MEDICINE

## 2020-10-15 PROCEDURE — 3008F PR BODY MASS INDEX (BMI) DOCUMENTED: ICD-10-PCS | Mod: CPTII,S$GLB,, | Performed by: INTERNAL MEDICINE

## 2020-10-15 PROCEDURE — 72200 X-RAY EXAM SI JOINTS: CPT | Mod: TC,FY,PO

## 2020-10-15 PROCEDURE — 72200 X-RAY EXAM SI JOINTS: CPT | Mod: 26,,, | Performed by: RADIOLOGY

## 2020-10-15 PROCEDURE — 72110 X-RAY EXAM L-2 SPINE 4/>VWS: CPT | Mod: 26,,, | Performed by: RADIOLOGY

## 2020-10-15 PROCEDURE — 72200 XR SACROILIAC JOINTS 3 VIEWS: ICD-10-PCS | Mod: 26,,, | Performed by: RADIOLOGY

## 2020-10-15 PROCEDURE — 99205 PR OFFICE/OUTPT VISIT, NEW, LEVL V, 60-74 MIN: ICD-10-PCS | Mod: S$GLB,,, | Performed by: INTERNAL MEDICINE

## 2020-10-15 PROCEDURE — 72070 XR THORACIC SPINE AP LATERAL: ICD-10-PCS | Mod: 26,,, | Performed by: RADIOLOGY

## 2020-10-15 PROCEDURE — 99999 PR PBB SHADOW E&M-EST. PATIENT-LVL IV: ICD-10-PCS | Mod: PBBFAC,,, | Performed by: INTERNAL MEDICINE

## 2020-10-15 RX ORDER — NAPROXEN 500 MG/1
500 TABLET ORAL 2 TIMES DAILY WITH MEALS
Qty: 60 TABLET | Refills: 2 | Status: SHIPPED | OUTPATIENT
Start: 2020-10-15 | End: 2020-12-14

## 2020-10-15 RX ORDER — BACLOFEN 10 MG/1
10 TABLET ORAL 3 TIMES DAILY
Qty: 90 TABLET | Refills: 6 | Status: SHIPPED | OUTPATIENT
Start: 2020-10-15 | End: 2022-01-14 | Stop reason: SDUPTHER

## 2020-10-15 RX ORDER — LEVOTHYROXINE SODIUM 125 UG/1
125 TABLET ORAL
COMMUNITY
Start: 2020-09-13 | End: 2022-03-21

## 2020-10-15 RX ORDER — AMOXICILLIN AND CLAVULANATE POTASSIUM 875; 125 MG/1; MG/1
TABLET, FILM COATED ORAL
COMMUNITY
Start: 2020-10-13 | End: 2020-12-07

## 2020-10-15 RX ORDER — TRAZODONE HYDROCHLORIDE 50 MG/1
50 TABLET ORAL NIGHTLY PRN
COMMUNITY
Start: 2020-08-01 | End: 2022-03-21

## 2020-10-15 ASSESSMENT — ROUTINE ASSESSMENT OF PATIENT INDEX DATA (RAPID3)
TOTAL RAPID3 SCORE: 2.67
PATIENT GLOBAL ASSESSMENT SCORE: 4
FATIGUE SCORE: 0
PAIN SCORE: 4
PSYCHOLOGICAL DISTRESS SCORE: 2.2
MDHAQ FUNCTION SCORE: 0

## 2020-10-15 NOTE — PROGRESS NOTES
"Subjective:          Chief Complaint: Glendy Andrade is a 54 y.o. female who had concerns including Disease Management.    HPI:  Patient is a 54-year-old female she was seen by a video visit during the COVID showed down for cervical pain lasting last 20 years.  She was also seen in 2019 with her back [facility] for similar complaint was found to have from a 2013 imaging disc height loss as well as C4 through C7 intervertebral osteophytosis she was recommended for physical therapy but was unable to attend consistently with work and caring for her mother.     Patient notes no recall of pain in teens and 20s. States she appreciated this about 30s with noted a stiffness in her cervical spine and some thoracic pain. Was told she had "some spurs". She notes stiffness of her cervical spine all the time.   She denies similar pain in the lumbar spine. She is a seamstress continues to work but notes with increased work it does get worse.   Right hand she notes intermittent in the 3rd 4th finger but that comes and goes. No weakness in UE and LE. No imbalance. No numbness.     She notes the pattern was predominantly PM, but waking stiff.   Has worked with chiropracter. Limited benefit  No other dactylitis. No Ps self of family. No IBD.     Mother with "spurs" , younger sister with spurs/arthritis.     Recent trial viibryd ASE-   Has been on Cymbalta- seems to have trouble with this class.     REVIEW OF SYSTEMS:    Review of Systems   Constitutional: Negative for fever, malaise/fatigue and weight loss.   HENT: Negative for sore throat.    Eyes: Negative for double vision, photophobia and redness.   Respiratory: Negative for cough, shortness of breath and wheezing.    Cardiovascular: Negative for chest pain, palpitations and orthopnea.   Gastrointestinal: Negative for abdominal pain, constipation and diarrhea.   Genitourinary: Negative for dysuria, hematuria and urgency.   Musculoskeletal: Positive for joint " pain and neck pain. Negative for back pain and myalgias.   Skin: Negative for rash.   Neurological: Negative for dizziness, tingling, focal weakness and headaches.   Endo/Heme/Allergies: Does not bruise/bleed easily.   Psychiatric/Behavioral: Negative for depression, hallucinations and suicidal ideas.               Objective:            Past Medical History:   Diagnosis Date    ADD (attention deficit disorder)     Depression     High risk HPV infection     Hyperlipidemia      Family History   Problem Relation Age of Onset    Hypertension Father     Arthritis Maternal Grandmother     Diabetes Maternal Grandmother     Diabetes Paternal Grandmother      Social History     Tobacco Use    Smoking status: Former Smoker     Quit date: 3/19/2016     Years since quittin.5    Smokeless tobacco: Never Used   Substance Use Topics    Alcohol use: Yes     Alcohol/week: 7.0 standard drinks     Types: 7 Glasses of wine per week    Drug use: No         Current Outpatient Medications on File Prior to Visit   Medication Sig Dispense Refill    amoxicillin-clavulanate 875-125mg (AUGMENTIN) 875-125 mg per tablet       dextroamphetamine-amphetamine (ADDERALL) 20 mg tablet TK 1 T PO BID  0    levothyroxine (SYNTHROID) 125 MCG tablet       traZODone (DESYREL) 50 MG tablet       sertraline (ZOLOFT) 50 MG tablet   1    [DISCONTINUED] sertraline (ZOLOFT) 25 MG tablet TK 1 T PO QD  1     No current facility-administered medications on file prior to visit.        Vitals:    10/15/20 1313   BP: 109/69   Pulse: 66       Physical Exam:    Physical Exam  Constitutional:       Appearance: She is well-developed.   HENT:      Head: Normocephalic and atraumatic.   Eyes:      Pupils: Pupils are equal, round, and reactive to light.   Neck:      Musculoskeletal: Normal range of motion.   Cardiovascular:      Rate and Rhythm: Normal rate and regular rhythm.      Heart sounds: Normal heart sounds.   Pulmonary:      Effort: Pulmonary  effort is normal.      Breath sounds: Normal breath sounds.   Musculoskeletal:      Right shoulder: She exhibits normal range of motion, no tenderness and no swelling.      Left shoulder: She exhibits normal range of motion, no tenderness and no swelling.      Right elbow: She exhibits normal range of motion and no swelling. No tenderness found.      Left elbow: She exhibits normal range of motion and no swelling. No tenderness found.      Right wrist: She exhibits normal range of motion, no tenderness and no swelling.      Left wrist: She exhibits normal range of motion, no tenderness and no swelling.      Right knee: She exhibits normal range of motion and no swelling. No tenderness found.      Left knee: She exhibits normal range of motion and no swelling. No tenderness found.      Right hand: She exhibits normal range of motion, no tenderness and no swelling.      Left hand: She exhibits normal range of motion, no tenderness and no swelling.      Right foot: Normal range of motion. No tenderness or swelling.      Left foot: Normal range of motion. No tenderness or swelling.      Comments: Surprisingly well preserved ROM c-spine. No weakness of UE or LE.   Sensation intact x 4 limbs.     schobers wnl.     No joint swelling or tenderness of PIP, MCP, wrist, elbow, shoulder, or knee joints       Skin:     General: Skin is warm and dry.   Neurological:      Mental Status: She is alert and oriented to person, place, and time.   Psychiatric:         Behavior: Behavior normal.         .      Assessment:       Encounter Diagnoses   Name Primary?    DISH (diffuse idiopathic skeletal hyperostosis) Yes    Inflammatory spondylopathy, unspecified spinal region     Dorsalgia, unspecified           Plan:        DISH (diffuse idiopathic skeletal hyperostosis)  -     Sedimentation rate; Future; Expected date: 10/15/2020  -     C-Reactive Protein; Standing; Expected date: 10/15/2020  -     HLA B27 Antigen; Future; Expected  date: 10/15/2020  -     X-Ray Lumbar Spine Complete 5 View; Future; Expected date: 10/15/2020  -     X-Ray Sacroiliac Joints 3 Views; Future; Expected date: 10/15/2020  -     X-Ray Thoracic Spine AP Lateral; Future; Expected date: 10/15/2020    Inflammatory spondylopathy, unspecified spinal region  -     Sedimentation rate; Future; Expected date: 10/15/2020  -     C-Reactive Protein; Standing; Expected date: 10/15/2020  -     HLA B27 Antigen; Future; Expected date: 10/15/2020  -     X-Ray Lumbar Spine Complete 5 View; Future; Expected date: 10/15/2020  -     X-Ray Sacroiliac Joints 3 Views; Future; Expected date: 10/15/2020  -     X-Ray Thoracic Spine AP Lateral; Future; Expected date: 10/15/2020    Dorsalgia, unspecified  -     X-Ray Lumbar Spine Complete 5 View; Future; Expected date: 10/15/2020    Other orders  -     baclofen (LIORESAL) 10 MG tablet; Take 1 tablet (10 mg total) by mouth 3 (three) times daily.  Dispense: 90 tablet; Refill: 6  -     naproxen (NAPROSYN) 500 MG tablet; Take 1 tablet (500 mg total) by mouth 2 (two) times daily with meals.  Dispense: 60 tablet; Refill: 2    Delightful 53 y/o with impressive osteophytosis of the cervical spine that is possible consisent with DISH but she is lacking many risk factors.   Pain/stiffness mostly non-inflammatory.   Want to review MRI - DIS cspine  Check HLA B27, SED, CRP - low suspicion for AS but I have not seen DISH with this pattern before.   Imaging of t-spine, l-spine and sacroiliac joints.   She has historically not tolerated SSRIs well will avoid Cymbalta.   Trial with Baclofen monitor for sedation  Naproxen 500mg BID  Discussed using APAP 650mg in between Naproxen dose.       No difficulty with swallowing, no UE and LE weakness.   Right fore arm and 3rd 4th finger intermittently numb appears more peripheral- will review MRI.     Follow up in about 4 months (around 2/15/2021).

## 2020-10-16 ENCOUNTER — PATIENT MESSAGE (OUTPATIENT)
Dept: RHEUMATOLOGY | Facility: CLINIC | Age: 55
End: 2020-10-16

## 2020-12-07 ENCOUNTER — OFFICE VISIT (OUTPATIENT)
Dept: FAMILY MEDICINE | Facility: CLINIC | Age: 55
End: 2020-12-07
Payer: COMMERCIAL

## 2020-12-07 VITALS
TEMPERATURE: 97 F | HEART RATE: 71 BPM | OXYGEN SATURATION: 98 % | SYSTOLIC BLOOD PRESSURE: 112 MMHG | RESPIRATION RATE: 16 BRPM | WEIGHT: 128.31 LBS | HEIGHT: 64 IN | DIASTOLIC BLOOD PRESSURE: 72 MMHG | BODY MASS INDEX: 21.91 KG/M2

## 2020-12-07 DIAGNOSIS — Z12.39 ENCOUNTER FOR SCREENING FOR MALIGNANT NEOPLASM OF BREAST, UNSPECIFIED SCREENING MODALITY: ICD-10-CM

## 2020-12-07 DIAGNOSIS — M48.10 DISH (DIFFUSE IDIOPATHIC SKELETAL HYPEROSTOSIS): ICD-10-CM

## 2020-12-07 DIAGNOSIS — E78.5 HYPERLIPIDEMIA, UNSPECIFIED HYPERLIPIDEMIA TYPE: ICD-10-CM

## 2020-12-07 DIAGNOSIS — F51.01 PRIMARY INSOMNIA: ICD-10-CM

## 2020-12-07 DIAGNOSIS — Z78.0 MENOPAUSE: ICD-10-CM

## 2020-12-07 DIAGNOSIS — F32.A DEPRESSION, UNSPECIFIED DEPRESSION TYPE: ICD-10-CM

## 2020-12-07 DIAGNOSIS — Z00.00 ANNUAL PHYSICAL EXAM: Primary | ICD-10-CM

## 2020-12-07 DIAGNOSIS — Z00.00 LABORATORY EXAM ORDERED AS PART OF ROUTINE GENERAL MEDICAL EXAMINATION: ICD-10-CM

## 2020-12-07 DIAGNOSIS — R53.83 FATIGUE, UNSPECIFIED TYPE: ICD-10-CM

## 2020-12-07 DIAGNOSIS — Z11.4 SCREENING FOR HIV WITHOUT PRESENCE OF RISK FACTORS: ICD-10-CM

## 2020-12-07 PROCEDURE — 99396 PR PREVENTIVE VISIT,EST,40-64: ICD-10-PCS | Mod: S$GLB,,, | Performed by: NURSE PRACTITIONER

## 2020-12-07 PROCEDURE — 3008F BODY MASS INDEX DOCD: CPT | Mod: CPTII,S$GLB,, | Performed by: NURSE PRACTITIONER

## 2020-12-07 PROCEDURE — 1126F PR PAIN SEVERITY QUANTIFIED, NO PAIN PRESENT: ICD-10-PCS | Mod: S$GLB,,, | Performed by: NURSE PRACTITIONER

## 2020-12-07 PROCEDURE — 3008F PR BODY MASS INDEX (BMI) DOCUMENTED: ICD-10-PCS | Mod: CPTII,S$GLB,, | Performed by: NURSE PRACTITIONER

## 2020-12-07 PROCEDURE — 1126F AMNT PAIN NOTED NONE PRSNT: CPT | Mod: S$GLB,,, | Performed by: NURSE PRACTITIONER

## 2020-12-07 PROCEDURE — 99396 PREV VISIT EST AGE 40-64: CPT | Mod: S$GLB,,, | Performed by: NURSE PRACTITIONER

## 2020-12-07 RX ORDER — VILAZODONE HYDROCHLORIDE 20 MG/1
TABLET ORAL
COMMUNITY
Start: 2020-11-30 | End: 2022-03-21

## 2020-12-07 RX ORDER — ZOSTER VACCINE RECOMBINANT, ADJUVANTED 50 MCG/0.5
0.5 KIT INTRAMUSCULAR ONCE
Qty: 1 EACH | Refills: 1 | Status: SHIPPED | OUTPATIENT
Start: 2020-12-07 | End: 2020-12-07

## 2020-12-07 NOTE — PROGRESS NOTES
Subjective:       Patient ID: Glendy Andrade is a 55 y.o. female.    Chief Complaint: Annual Exam    HPI here for her annual exam. States she has been doing well. She is due for routine labs. She was diagnosed with DISH since our last visit and started on baclofen prn. States this has helped. She is followed by pain management. She was started on viibryd for her depression by her therapist. That seems to be working well for her. She is due for thyroid level check. She would also like to have her hormones checked even though she is post menopausal. She is using the trazodone prn for sleep issues, states that helps. She has not had a dexa scan, will schedule that since she has new diagnosis of DISH. She denies any other concerns. See ROS.    The following portion of the patients history was reviewed and updated as appropriate: allergies, current medications, past medical and surgical history. Past social history and problem list reviewed. Family PMH and Past social history reviewed. Tobacco, Illicit drug use reviewed.      Review of patient's allergies indicates:  No Known Allergies      Current Outpatient Medications:     baclofen (LIORESAL) 10 MG tablet, Take 1 tablet (10 mg total) by mouth 3 (three) times daily., Disp: 90 tablet, Rfl: 6    levothyroxine (SYNTHROID) 125 MCG tablet, Take 125 mcg by mouth before breakfast. , Disp: , Rfl:     naproxen (NAPROSYN) 500 MG tablet, Take 1 tablet (500 mg total) by mouth 2 (two) times daily with meals., Disp: 60 tablet, Rfl: 2    traZODone (DESYREL) 50 MG tablet, Take 50 mg by mouth nightly as needed. , Disp: , Rfl:     VIIBRYD 20 mg Tab, TK 1 T PO QD, Disp: , Rfl:     Past Medical History:   Diagnosis Date    ADD (attention deficit disorder)     Depression     High risk HPV infection     Hyperlipidemia        Past Surgical History:   Procedure Laterality Date    BREAST BIOPSY Left     2011 bening    THYROIDECTOMY      TONSILLECTOMY         Social History      Socioeconomic History    Marital status:      Spouse name: Not on file    Number of children: Not on file    Years of education: Not on file    Highest education level: Not on file   Occupational History    Not on file   Social Needs    Financial resource strain: Not on file    Food insecurity     Worry: Not on file     Inability: Not on file    Transportation needs     Medical: Not on file     Non-medical: Not on file   Tobacco Use    Smoking status: Former Smoker     Quit date: 3/19/2016     Years since quittin.7    Smokeless tobacco: Never Used   Substance and Sexual Activity    Alcohol use: Yes     Alcohol/week: 7.0 standard drinks     Types: 7 Glasses of wine per week    Drug use: No    Sexual activity: Yes     Partners: Male     Birth control/protection: Post-menopausal, None   Lifestyle    Physical activity     Days per week: Not on file     Minutes per session: Not on file    Stress: Not on file   Relationships    Social connections     Talks on phone: Not on file     Gets together: Not on file     Attends Yarsanism service: Not on file     Active member of club or organization: Not on file     Attends meetings of clubs or organizations: Not on file     Relationship status: Not on file   Other Topics Concern    Not on file   Social History Narrative    Not on file         Review of Systems   Constitutional: Negative for fatigue and fever.   HENT: Negative for congestion, postnasal drip, rhinorrhea and voice change.    Eyes: Negative for visual disturbance.   Respiratory: Negative for cough, chest tightness, shortness of breath and wheezing.    Cardiovascular: Negative for chest pain, palpitations and leg swelling.   Gastrointestinal: Negative for abdominal pain, blood in stool, constipation, diarrhea, nausea and vomiting.   Genitourinary: Negative for difficulty urinating and dysuria.   Musculoskeletal: Positive for arthralgias (chronic issue). Negative for back pain and gait  "problem.   Skin: Negative for rash and wound.   Neurological: Negative for dizziness, weakness and headaches.   Hematological: Negative for adenopathy. Does not bruise/bleed easily.   Psychiatric/Behavioral: Negative for decreased concentration, dysphoric mood and sleep disturbance. The patient is not nervous/anxious.        Objective:      /72   Pulse 71   Temp 97.3 °F (36.3 °C) (Temporal)   Resp 16   Ht 5' 4" (1.626 m)   Wt 58.2 kg (128 lb 4.9 oz)   SpO2 98%   BMI 22.02 kg/m²       Physical Exam  Constitutional:       General: She is not in acute distress.     Appearance: Normal appearance. She is well-developed and normal weight.   HENT:      Head: Normocephalic.      Mouth/Throat:      Mouth: Mucous membranes are moist.      Pharynx: Oropharynx is clear.   Eyes:      Conjunctiva/sclera: Conjunctivae normal.      Pupils: Pupils are equal, round, and reactive to light.   Neck:      Musculoskeletal: Full passive range of motion without pain, normal range of motion and neck supple. No edema.      Thyroid: No thyromegaly.      Trachea: Trachea normal. No tracheal tenderness or tracheal deviation.   Cardiovascular:      Rate and Rhythm: Normal rate and regular rhythm.      Pulses:           Carotid pulses are 2+ on the right side and 2+ on the left side.       Radial pulses are 2+ on the right side and 2+ on the left side.      Heart sounds: Normal heart sounds. No murmur. No gallop.    Pulmonary:      Effort: Pulmonary effort is normal. No respiratory distress.      Breath sounds: Normal breath sounds. No stridor. No wheezing, rhonchi or rales.   Abdominal:      General: Bowel sounds are normal.      Palpations: Abdomen is soft. There is no splenomegaly or mass.      Tenderness: There is no abdominal tenderness. There is no rebound.   Musculoskeletal: Normal range of motion.      Right lower leg: No edema.      Left lower leg: No edema.      Comments: Gait and coordination normal.  strong, equal. " Upper and lower extremity strength normal.    Skin:     General: Skin is warm and dry.      Capillary Refill: Capillary refill takes less than 2 seconds.      Findings: No lesion or rash.   Neurological:      General: No focal deficit present.      Mental Status: She is alert and oriented to person, place, and time.   Psychiatric:         Attention and Perception: Attention and perception normal.         Mood and Affect: Mood and affect normal.         Speech: Speech normal.         Behavior: Behavior normal.         Assessment:       1. Annual physical exam    2. Laboratory exam ordered as part of routine general medical examination    3. Fatigue, unspecified type    4. Menopause    5. Screening for HIV without presence of risk factors    6. Encounter for screening for malignant neoplasm of breast, unspecified screening modality    7. Depression, unspecified depression type    8. DISH (diffuse idiopathic skeletal hyperostosis)    9. Hyperlipidemia, unspecified hyperlipidemia type    10. Primary insomnia        Plan:       Annual physical exam    Laboratory exam ordered as part of routine general medical examination  -     CBC Auto Differential; Future; Expected date: 12/07/2020  -     Comprehensive Metabolic Panel; Future; Expected date: 12/07/2020  -     Lipid Panel; Future; Expected date: 12/07/2020    Fatigue, unspecified type: check thyroid levels  -     TSH; Future; Expected date: 12/07/2020  -     T4; Future; Expected date: 12/07/2020  -     T3; Future; Expected date: 12/07/2020    Menopause: check hormone levels  -     Estradiol; Future; Expected date: 12/07/2020  -     Progesterone; Future; Expected date: 12/07/2020  -     Follicle Stimulating Hormone; Future; Expected date: 12/07/2020  -     Testosterone; Future; Expected date: 12/07/2020  -     DXA Bone Density Spine And Hip; Future; Expected date: 12/07/2020    Screening for HIV without presence of risk factors  -     HIV 1/2 Ag/Ab (4th Gen); Future;  Expected date: 12/07/2020    Encounter for screening for malignant neoplasm of breast, unspecified screening modality  -     Mammo Digital Screening Bilat; Future; Expected date: 12/07/2020    Depression, unspecified depression type; continue to follow with psychiatry. Doing well on the viibryd    DISH (diffuse idiopathic skeletal hyperostosis): will get Dexa scan. Continue to follow with Pain management.    Hyperlipidemia, unspecified hyperlipidemia type: check labs.    Primary insomnia: continue trazodone prn    Other orders  -     varicella-zoster [id number], PF, (SHINGRIX, PF,) 50 mcg/0.5 mL injection; Inject 0.5 mLs into the muscle once. for 1 dose  Dispense: 1 each; Refill: 1       Continue current medication  Take medications only as prescribed  Healthy diet, exercise  Adequate rest  Adequate hydration  Avoid allergens  Avoid excessive caffeine     follow up in 6 months

## 2020-12-09 ENCOUNTER — LAB VISIT (OUTPATIENT)
Dept: LAB | Facility: HOSPITAL | Age: 55
End: 2020-12-09
Attending: NURSE PRACTITIONER
Payer: COMMERCIAL

## 2020-12-09 DIAGNOSIS — Z78.0 MENOPAUSE: ICD-10-CM

## 2020-12-09 DIAGNOSIS — R53.83 FATIGUE, UNSPECIFIED TYPE: ICD-10-CM

## 2020-12-09 DIAGNOSIS — Z11.4 SCREENING FOR HIV WITHOUT PRESENCE OF RISK FACTORS: ICD-10-CM

## 2020-12-09 DIAGNOSIS — Z00.00 LABORATORY EXAM ORDERED AS PART OF ROUTINE GENERAL MEDICAL EXAMINATION: ICD-10-CM

## 2020-12-09 LAB
ALBUMIN SERPL BCP-MCNC: 4.5 G/DL (ref 3.5–5.2)
ALP SERPL-CCNC: 72 U/L (ref 55–135)
ALT SERPL W/O P-5'-P-CCNC: 26 U/L (ref 10–44)
ANION GAP SERPL CALC-SCNC: 10 MMOL/L (ref 8–16)
AST SERPL-CCNC: 25 U/L (ref 10–40)
BASOPHILS # BLD AUTO: 0.02 K/UL (ref 0–0.2)
BASOPHILS NFR BLD: 0.4 % (ref 0–1.9)
BILIRUB SERPL-MCNC: 0.6 MG/DL (ref 0.1–1)
BUN SERPL-MCNC: 19 MG/DL (ref 6–20)
CALCIUM SERPL-MCNC: 9.5 MG/DL (ref 8.7–10.5)
CHLORIDE SERPL-SCNC: 104 MMOL/L (ref 95–110)
CHOLEST SERPL-MCNC: 276 MG/DL (ref 120–199)
CHOLEST/HDLC SERPL: 2.5 {RATIO} (ref 2–5)
CO2 SERPL-SCNC: 28 MMOL/L (ref 23–29)
CREAT SERPL-MCNC: 0.9 MG/DL (ref 0.5–1.4)
DIFFERENTIAL METHOD: ABNORMAL
EOSINOPHIL # BLD AUTO: 0.1 K/UL (ref 0–0.5)
EOSINOPHIL NFR BLD: 2.1 % (ref 0–8)
ERYTHROCYTE [DISTWIDTH] IN BLOOD BY AUTOMATED COUNT: 13.4 % (ref 11.5–14.5)
EST. GFR  (AFRICAN AMERICAN): >60 ML/MIN/1.73 M^2
EST. GFR  (NON AFRICAN AMERICAN): >60 ML/MIN/1.73 M^2
ESTRADIOL SERPL-MCNC: 11 PG/ML
FSH SERPL-ACNC: 94.9 MIU/ML
GLUCOSE SERPL-MCNC: 86 MG/DL (ref 70–110)
HCT VFR BLD AUTO: 47.6 % (ref 37–48.5)
HDLC SERPL-MCNC: 111 MG/DL (ref 40–75)
HDLC SERPL: 40.2 % (ref 20–50)
HGB BLD-MCNC: 15 G/DL (ref 12–16)
IMM GRANULOCYTES # BLD AUTO: 0.01 K/UL (ref 0–0.04)
IMM GRANULOCYTES NFR BLD AUTO: 0.2 % (ref 0–0.5)
LDLC SERPL CALC-MCNC: 144 MG/DL (ref 63–159)
LYMPHOCYTES # BLD AUTO: 1.7 K/UL (ref 1–4.8)
LYMPHOCYTES NFR BLD: 36.8 % (ref 18–48)
MCH RBC QN AUTO: 31.5 PG (ref 27–31)
MCHC RBC AUTO-ENTMCNC: 31.5 G/DL (ref 32–36)
MCV RBC AUTO: 100 FL (ref 82–98)
MONOCYTES # BLD AUTO: 0.4 K/UL (ref 0.3–1)
MONOCYTES NFR BLD: 9 % (ref 4–15)
NEUTROPHILS # BLD AUTO: 2.4 K/UL (ref 1.8–7.7)
NEUTROPHILS NFR BLD: 51.5 % (ref 38–73)
NONHDLC SERPL-MCNC: 165 MG/DL
NRBC BLD-RTO: 0 /100 WBC
PLATELET # BLD AUTO: 251 K/UL (ref 150–350)
PMV BLD AUTO: 11.4 FL (ref 9.2–12.9)
POTASSIUM SERPL-SCNC: 4.1 MMOL/L (ref 3.5–5.1)
PROGEST SERPL-MCNC: 0.2 NG/ML
PROT SERPL-MCNC: 8 G/DL (ref 6–8.4)
RBC # BLD AUTO: 4.76 M/UL (ref 4–5.4)
SODIUM SERPL-SCNC: 142 MMOL/L (ref 136–145)
T3 SERPL-MCNC: 81 NG/DL (ref 60–180)
T4 SERPL-MCNC: 6.9 UG/DL (ref 4.5–11.5)
TESTOST SERPL-MCNC: 43 NG/DL (ref 5–73)
TRIGL SERPL-MCNC: 105 MG/DL (ref 30–150)
TSH SERPL DL<=0.005 MIU/L-ACNC: 1 UIU/ML (ref 0.4–4)
WBC # BLD AUTO: 4.67 K/UL (ref 3.9–12.7)

## 2020-12-09 PROCEDURE — 84480 ASSAY TRIIODOTHYRONINE (T3): CPT

## 2020-12-09 PROCEDURE — 84443 ASSAY THYROID STIM HORMONE: CPT

## 2020-12-09 PROCEDURE — 83001 ASSAY OF GONADOTROPIN (FSH): CPT

## 2020-12-09 PROCEDURE — 84403 ASSAY OF TOTAL TESTOSTERONE: CPT

## 2020-12-09 PROCEDURE — 84436 ASSAY OF TOTAL THYROXINE: CPT

## 2020-12-09 PROCEDURE — 85025 COMPLETE CBC W/AUTO DIFF WBC: CPT

## 2020-12-09 PROCEDURE — 84144 ASSAY OF PROGESTERONE: CPT

## 2020-12-09 PROCEDURE — 36415 COLL VENOUS BLD VENIPUNCTURE: CPT | Mod: PN

## 2020-12-09 PROCEDURE — 86703 HIV-1/HIV-2 1 RESULT ANTBDY: CPT

## 2020-12-09 PROCEDURE — 80053 COMPREHEN METABOLIC PANEL: CPT

## 2020-12-09 PROCEDURE — 80061 LIPID PANEL: CPT

## 2020-12-09 PROCEDURE — 82670 ASSAY OF TOTAL ESTRADIOL: CPT

## 2020-12-11 ENCOUNTER — PATIENT MESSAGE (OUTPATIENT)
Dept: FAMILY MEDICINE | Facility: CLINIC | Age: 55
End: 2020-12-11

## 2020-12-11 LAB — HIV 1+2 AB+HIV1 P24 AG SERPL QL IA: NEGATIVE

## 2021-01-25 ENCOUNTER — PATIENT MESSAGE (OUTPATIENT)
Dept: FAMILY MEDICINE | Facility: CLINIC | Age: 56
End: 2021-01-25

## 2021-01-25 DIAGNOSIS — R30.0 DYSURIA: Primary | ICD-10-CM

## 2021-02-21 ENCOUNTER — TELEPHONE (OUTPATIENT)
Dept: RHEUMATOLOGY | Facility: CLINIC | Age: 56
End: 2021-02-21

## 2021-02-21 DIAGNOSIS — M48.02 CERVICAL STENOSIS OF SPINAL CANAL: Primary | ICD-10-CM

## 2021-02-27 ENCOUNTER — PATIENT OUTREACH (OUTPATIENT)
Dept: ADMINISTRATIVE | Facility: OTHER | Age: 56
End: 2021-02-27

## 2021-03-02 ENCOUNTER — OFFICE VISIT (OUTPATIENT)
Dept: OTOLARYNGOLOGY | Facility: CLINIC | Age: 56
End: 2021-03-02
Payer: COMMERCIAL

## 2021-03-02 VITALS — HEIGHT: 64 IN | WEIGHT: 125.88 LBS | BODY MASS INDEX: 21.49 KG/M2

## 2021-03-02 DIAGNOSIS — R06.83 SNORING: ICD-10-CM

## 2021-03-02 DIAGNOSIS — G47.33 OSA (OBSTRUCTIVE SLEEP APNEA): ICD-10-CM

## 2021-03-02 DIAGNOSIS — M48.10 DISH (DIFFUSE IDIOPATHIC SKELETAL HYPEROSTOSIS): ICD-10-CM

## 2021-03-02 DIAGNOSIS — J34.2 NASAL SEPTAL DEVIATION: Primary | ICD-10-CM

## 2021-03-02 DIAGNOSIS — M25.70 OSTEOPHYTE DETERMINED BY X-RAY: ICD-10-CM

## 2021-03-02 DIAGNOSIS — J34.3 HYPERTROPHY OF BOTH INFERIOR NASAL TURBINATES: ICD-10-CM

## 2021-03-02 PROCEDURE — 99999 PR PBB SHADOW E&M-EST. PATIENT-LVL III: CPT | Mod: PBBFAC,,, | Performed by: OTOLARYNGOLOGY

## 2021-03-02 PROCEDURE — 99999 PR PBB SHADOW E&M-EST. PATIENT-LVL III: ICD-10-PCS | Mod: PBBFAC,,, | Performed by: OTOLARYNGOLOGY

## 2021-03-02 PROCEDURE — 3008F BODY MASS INDEX DOCD: CPT | Mod: CPTII,S$GLB,, | Performed by: OTOLARYNGOLOGY

## 2021-03-02 PROCEDURE — 1126F PR PAIN SEVERITY QUANTIFIED, NO PAIN PRESENT: ICD-10-PCS | Mod: S$GLB,,, | Performed by: OTOLARYNGOLOGY

## 2021-03-02 PROCEDURE — 99204 PR OFFICE/OUTPT VISIT, NEW, LEVL IV, 45-59 MIN: ICD-10-PCS | Mod: 25,S$GLB,, | Performed by: OTOLARYNGOLOGY

## 2021-03-02 PROCEDURE — 99204 OFFICE O/P NEW MOD 45 MIN: CPT | Mod: 25,S$GLB,, | Performed by: OTOLARYNGOLOGY

## 2021-03-02 PROCEDURE — 1126F AMNT PAIN NOTED NONE PRSNT: CPT | Mod: S$GLB,,, | Performed by: OTOLARYNGOLOGY

## 2021-03-02 PROCEDURE — 3008F PR BODY MASS INDEX (BMI) DOCUMENTED: ICD-10-PCS | Mod: CPTII,S$GLB,, | Performed by: OTOLARYNGOLOGY

## 2021-03-02 RX ORDER — ONDANSETRON HYDROCHLORIDE 8 MG/1
TABLET, FILM COATED ORAL
COMMUNITY
Start: 2020-12-29

## 2021-03-02 RX ORDER — DEXTROAMPHETAMINE SACCHARATE, AMPHETAMINE ASPARTATE, DEXTROAMPHETAMINE SULFATE AND AMPHETAMINE SULFATE 5; 5; 5; 5 MG/1; MG/1; MG/1; MG/1
1 TABLET ORAL DAILY
COMMUNITY
Start: 2021-01-28 | End: 2023-03-08

## 2021-05-10 ENCOUNTER — PATIENT MESSAGE (OUTPATIENT)
Dept: RESEARCH | Facility: HOSPITAL | Age: 56
End: 2021-05-10

## 2021-06-26 ENCOUNTER — HOSPITAL ENCOUNTER (OUTPATIENT)
Dept: RADIOLOGY | Facility: HOSPITAL | Age: 56
Discharge: HOME | End: 2021-06-26
Attending: NURSE PRACTITIONER
Payer: COMMERCIAL

## 2021-06-26 DIAGNOSIS — Z12.39 BREAST CANCER SCREENING: ICD-10-CM

## 2021-06-26 DIAGNOSIS — Z12.31 BREAST CANCER SCREENING BY MAMMOGRAM: ICD-10-CM

## 2021-06-26 PROCEDURE — 77063 BREAST TOMOSYNTHESIS BI: CPT | Mod: 26,,, | Performed by: RADIOLOGY

## 2021-06-26 PROCEDURE — 77067 SCR MAMMO BI INCL CAD: CPT | Mod: TC,PO

## 2021-06-26 PROCEDURE — 77067 MAMMO DIGITAL SCREENING BILAT WITH TOMO: ICD-10-PCS | Mod: 26,,, | Performed by: RADIOLOGY

## 2021-06-26 PROCEDURE — 77063 MAMMO DIGITAL SCREENING BILAT WITH TOMO: ICD-10-PCS | Mod: 26,,, | Performed by: RADIOLOGY

## 2021-06-26 PROCEDURE — 77067 SCR MAMMO BI INCL CAD: CPT | Mod: 26,,, | Performed by: RADIOLOGY

## 2021-06-28 ENCOUNTER — OFFICE VISIT (OUTPATIENT)
Dept: FAMILY MEDICINE | Facility: CLINIC | Age: 56
End: 2021-06-28
Payer: COMMERCIAL

## 2021-06-28 VITALS
RESPIRATION RATE: 14 BRPM | WEIGHT: 126.31 LBS | TEMPERATURE: 98 F | OXYGEN SATURATION: 98 % | SYSTOLIC BLOOD PRESSURE: 108 MMHG | BODY MASS INDEX: 21.56 KG/M2 | HEIGHT: 64 IN | HEART RATE: 76 BPM | DIASTOLIC BLOOD PRESSURE: 68 MMHG

## 2021-06-28 DIAGNOSIS — B97.7 HIGH RISK HPV INFECTION: ICD-10-CM

## 2021-06-28 DIAGNOSIS — F32.A DEPRESSION, UNSPECIFIED DEPRESSION TYPE: Primary | ICD-10-CM

## 2021-06-28 DIAGNOSIS — F51.01 PRIMARY INSOMNIA: ICD-10-CM

## 2021-06-28 DIAGNOSIS — F98.8 ATTENTION DEFICIT DISORDER (ADD) WITHOUT HYPERACTIVITY: ICD-10-CM

## 2021-06-28 DIAGNOSIS — E03.4 HYPOTHYROIDISM DUE TO ACQUIRED ATROPHY OF THYROID: ICD-10-CM

## 2021-06-28 DIAGNOSIS — M48.10 DISH (DIFFUSE IDIOPATHIC SKELETAL HYPEROSTOSIS): ICD-10-CM

## 2021-06-28 DIAGNOSIS — F43.20 ADJUSTMENT DISORDER, UNSPECIFIED TYPE: ICD-10-CM

## 2021-06-28 DIAGNOSIS — E78.5 HYPERLIPIDEMIA, UNSPECIFIED HYPERLIPIDEMIA TYPE: ICD-10-CM

## 2021-06-28 PROCEDURE — 3008F PR BODY MASS INDEX (BMI) DOCUMENTED: ICD-10-PCS | Mod: CPTII,S$GLB,, | Performed by: NURSE PRACTITIONER

## 2021-06-28 PROCEDURE — 1126F PR PAIN SEVERITY QUANTIFIED, NO PAIN PRESENT: ICD-10-PCS | Mod: S$GLB,,, | Performed by: NURSE PRACTITIONER

## 2021-06-28 PROCEDURE — 99214 PR OFFICE/OUTPT VISIT, EST, LEVL IV, 30-39 MIN: ICD-10-PCS | Mod: S$GLB,,, | Performed by: NURSE PRACTITIONER

## 2021-06-28 PROCEDURE — 1126F AMNT PAIN NOTED NONE PRSNT: CPT | Mod: S$GLB,,, | Performed by: NURSE PRACTITIONER

## 2021-06-28 PROCEDURE — 99214 OFFICE O/P EST MOD 30 MIN: CPT | Mod: S$GLB,,, | Performed by: NURSE PRACTITIONER

## 2021-06-28 PROCEDURE — 3008F BODY MASS INDEX DOCD: CPT | Mod: CPTII,S$GLB,, | Performed by: NURSE PRACTITIONER

## 2021-08-05 ENCOUNTER — PATIENT OUTREACH (OUTPATIENT)
Dept: ADMINISTRATIVE | Facility: OTHER | Age: 56
End: 2021-08-05

## 2021-08-06 ENCOUNTER — OFFICE VISIT (OUTPATIENT)
Dept: PAIN MEDICINE | Facility: CLINIC | Age: 56
End: 2021-08-06
Payer: COMMERCIAL

## 2021-08-06 VITALS
RESPIRATION RATE: 18 BRPM | HEART RATE: 64 BPM | BODY MASS INDEX: 21.66 KG/M2 | WEIGHT: 122.25 LBS | TEMPERATURE: 97 F | DIASTOLIC BLOOD PRESSURE: 67 MMHG | SYSTOLIC BLOOD PRESSURE: 119 MMHG | OXYGEN SATURATION: 100 % | HEIGHT: 63 IN

## 2021-08-06 DIAGNOSIS — M47.812 CERVICAL SPONDYLOSIS: ICD-10-CM

## 2021-08-06 DIAGNOSIS — M48.10 DISH (DIFFUSE IDIOPATHIC SKELETAL HYPEROSTOSIS): Primary | ICD-10-CM

## 2021-08-06 PROCEDURE — 99999 PR PBB SHADOW E&M-EST. PATIENT-LVL III: CPT | Mod: PBBFAC,,, | Performed by: ANESTHESIOLOGY

## 2021-08-06 PROCEDURE — 3078F DIAST BP <80 MM HG: CPT | Mod: CPTII,S$GLB,, | Performed by: ANESTHESIOLOGY

## 2021-08-06 PROCEDURE — 3074F SYST BP LT 130 MM HG: CPT | Mod: CPTII,S$GLB,, | Performed by: ANESTHESIOLOGY

## 2021-08-06 PROCEDURE — 99999 PR PBB SHADOW E&M-EST. PATIENT-LVL III: ICD-10-PCS | Mod: PBBFAC,,, | Performed by: ANESTHESIOLOGY

## 2021-08-06 PROCEDURE — 99204 OFFICE O/P NEW MOD 45 MIN: CPT | Mod: S$GLB,,, | Performed by: ANESTHESIOLOGY

## 2021-08-06 PROCEDURE — 99204 PR OFFICE/OUTPT VISIT, NEW, LEVL IV, 45-59 MIN: ICD-10-PCS | Mod: S$GLB,,, | Performed by: ANESTHESIOLOGY

## 2021-08-06 PROCEDURE — 1159F PR MEDICATION LIST DOCUMENTED IN MEDICAL RECORD: ICD-10-PCS | Mod: CPTII,S$GLB,, | Performed by: ANESTHESIOLOGY

## 2021-08-06 PROCEDURE — 1125F PR PAIN SEVERITY QUANTIFIED, PAIN PRESENT: ICD-10-PCS | Mod: CPTII,S$GLB,, | Performed by: ANESTHESIOLOGY

## 2021-08-06 PROCEDURE — 3074F PR MOST RECENT SYSTOLIC BLOOD PRESSURE < 130 MM HG: ICD-10-PCS | Mod: CPTII,S$GLB,, | Performed by: ANESTHESIOLOGY

## 2021-08-06 PROCEDURE — 1125F AMNT PAIN NOTED PAIN PRSNT: CPT | Mod: CPTII,S$GLB,, | Performed by: ANESTHESIOLOGY

## 2021-08-06 PROCEDURE — 3008F PR BODY MASS INDEX (BMI) DOCUMENTED: ICD-10-PCS | Mod: CPTII,S$GLB,, | Performed by: ANESTHESIOLOGY

## 2021-08-06 PROCEDURE — 3008F BODY MASS INDEX DOCD: CPT | Mod: CPTII,S$GLB,, | Performed by: ANESTHESIOLOGY

## 2021-08-06 PROCEDURE — 1159F MED LIST DOCD IN RCRD: CPT | Mod: CPTII,S$GLB,, | Performed by: ANESTHESIOLOGY

## 2021-08-06 PROCEDURE — 3078F PR MOST RECENT DIASTOLIC BLOOD PRESSURE < 80 MM HG: ICD-10-PCS | Mod: CPTII,S$GLB,, | Performed by: ANESTHESIOLOGY

## 2021-12-27 ENCOUNTER — TELEPHONE (OUTPATIENT)
Dept: FAMILY MEDICINE | Facility: CLINIC | Age: 56
End: 2021-12-27
Payer: COMMERCIAL

## 2022-03-21 ENCOUNTER — OFFICE VISIT (OUTPATIENT)
Dept: PAIN MEDICINE | Facility: CLINIC | Age: 57
End: 2022-03-21
Payer: COMMERCIAL

## 2022-03-21 ENCOUNTER — PATIENT OUTREACH (OUTPATIENT)
Dept: ADMINISTRATIVE | Facility: OTHER | Age: 57
End: 2022-03-21
Payer: COMMERCIAL

## 2022-03-21 VITALS
BODY MASS INDEX: 23.05 KG/M2 | HEART RATE: 59 BPM | HEIGHT: 62 IN | WEIGHT: 125.25 LBS | DIASTOLIC BLOOD PRESSURE: 66 MMHG | SYSTOLIC BLOOD PRESSURE: 116 MMHG

## 2022-03-21 DIAGNOSIS — M47.812 CERVICAL SPONDYLOSIS: ICD-10-CM

## 2022-03-21 DIAGNOSIS — M48.10 DISH (DIFFUSE IDIOPATHIC SKELETAL HYPEROSTOSIS): Primary | ICD-10-CM

## 2022-03-21 DIAGNOSIS — M54.12 CERVICAL RADICULOPATHY: ICD-10-CM

## 2022-03-21 PROCEDURE — 3008F BODY MASS INDEX DOCD: CPT | Mod: CPTII,S$GLB,, | Performed by: ANESTHESIOLOGY

## 2022-03-21 PROCEDURE — 1159F MED LIST DOCD IN RCRD: CPT | Mod: CPTII,S$GLB,, | Performed by: ANESTHESIOLOGY

## 2022-03-21 PROCEDURE — 3008F PR BODY MASS INDEX (BMI) DOCUMENTED: ICD-10-PCS | Mod: CPTII,S$GLB,, | Performed by: ANESTHESIOLOGY

## 2022-03-21 PROCEDURE — 99999 PR PBB SHADOW E&M-EST. PATIENT-LVL III: ICD-10-PCS | Mod: PBBFAC,,, | Performed by: ANESTHESIOLOGY

## 2022-03-21 PROCEDURE — 99999 PR PBB SHADOW E&M-EST. PATIENT-LVL III: CPT | Mod: PBBFAC,,, | Performed by: ANESTHESIOLOGY

## 2022-03-21 PROCEDURE — 3074F PR MOST RECENT SYSTOLIC BLOOD PRESSURE < 130 MM HG: ICD-10-PCS | Mod: CPTII,S$GLB,, | Performed by: ANESTHESIOLOGY

## 2022-03-21 PROCEDURE — 3078F PR MOST RECENT DIASTOLIC BLOOD PRESSURE < 80 MM HG: ICD-10-PCS | Mod: CPTII,S$GLB,, | Performed by: ANESTHESIOLOGY

## 2022-03-21 PROCEDURE — 1159F PR MEDICATION LIST DOCUMENTED IN MEDICAL RECORD: ICD-10-PCS | Mod: CPTII,S$GLB,, | Performed by: ANESTHESIOLOGY

## 2022-03-21 PROCEDURE — 99214 OFFICE O/P EST MOD 30 MIN: CPT | Mod: S$GLB,,, | Performed by: ANESTHESIOLOGY

## 2022-03-21 PROCEDURE — 99214 PR OFFICE/OUTPT VISIT, EST, LEVL IV, 30-39 MIN: ICD-10-PCS | Mod: S$GLB,,, | Performed by: ANESTHESIOLOGY

## 2022-03-21 PROCEDURE — 3074F SYST BP LT 130 MM HG: CPT | Mod: CPTII,S$GLB,, | Performed by: ANESTHESIOLOGY

## 2022-03-21 PROCEDURE — 3078F DIAST BP <80 MM HG: CPT | Mod: CPTII,S$GLB,, | Performed by: ANESTHESIOLOGY

## 2022-03-21 NOTE — PROGRESS NOTES
This note was completed with dictation software and grammatical errors may exist.    CC:  Neck pain, upper back pain, difficulty swallowing, snoring    HPI:  Patient is a 55-year-old woman with a history of hypothyroidism, ADD with a recent diagnosis of DISH presents in referral from Dr. Yun [facility] for neck pain.    Patient returns in follow-up today for neck pain, the last time I had seen her, she complained mostly of stiffness without much pain, no radicular symptoms.  However since we had last seen her, she states that she continues to have swallowing problems, ENT does feel that this is due to her cervical DISH.  She is also having sleep apnea however, needs to set up a sleep study.  She states that she is now getting a great deal pain in the neck, upper back, radiates out to sides but she denies any numbness or tingling in her arms.  However she has been having some heaviness in her legs and cramping in her calves.  She denies that this is with walking or any specific activity, denies any cindi weakness.  She had been taking ibuprofen with some relief but that does not seem to be helping as much anymore.  She does feel that exercise and movement seems to help.  She has been walking a great deal more so unsure if this is causing some of her cramping in her calves.      Previous history:Patient reports having neck pain for over 20 years, usually worse with work and stress.  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  She reports that the pain is in the neck, left shoulder, midline scapular region and reports having numbness and tingling down the left arm.  She used to have this frequently but lately the arm symptoms have largely resolved.  She reports that she generally gets worsened pain with sitting and the pain is worse in the morning and she gets some relief with medications and heat.  She recently  had x-rays of her cervical spine and thoracic spine which show diffuse idiopathic skeletal hyperostosis with significant anterior osteophytes.  She has been having difficulty with swallowing lately but also reports having some issues with snoring and she is currently being worked up by ENT.    Pain intervention history:No History of injections    Spine surgeries:      Antineuropathics:  Sometimes uses CBD or THC  NSAIDs:  Aspirin, Aleve  Physical therapy:  Antidepressants:  Muscle relaxers:  Baclofen 10  Opioids:  Antiplatelets/Anticoagulants:        ROS:  She reports fatigue, skin texture change, headaches, vision change, swollen glands, cough, diarrhea, easy bruising, hypothyroidism, blood in the urine, joint stiffness, back pain and anxiety.  Balance of review of systems is negative.    No results found for: LABA1C, HGBA1C    Lab Results   Component Value Date    WBC 4.67 2020    HGB 15.0 2020    HCT 47.6 2020     (H) 2020     2020             Past Medical History:   Diagnosis Date    ADD (attention deficit disorder)     Depression     DISH (diffuse idiopathic skeletal hyperostosis)     High risk HPV infection     Hyperlipidemia     Hypothyroidism     Insomnia        Past Surgical History:   Procedure Laterality Date    BREAST BIOPSY Left      bening    THYROIDECTOMY      TONSILLECTOMY      TOTAL REDUCTION MAMMOPLASTY Bilateral     breast lift       Social History     Socioeconomic History    Marital status:    Tobacco Use    Smoking status: Former Smoker     Quit date: 3/19/2016     Years since quittin.0    Smokeless tobacco: Never Used   Substance and Sexual Activity    Alcohol use: Yes     Alcohol/week: 7.0 standard drinks     Types: 7 Glasses of wine per week    Drug use: No    Sexual activity: Yes     Partners: Male     Birth control/protection: Post-menopausal, None         Medications/Allergies: See med card    Vitals:     "22 0915   BP: 116/66   Pulse: (!) 59   Weight: 56.8 kg (125 lb 3.5 oz)   Height: 5' 2.4" (1.585 m)   PainSc:   3   PainLoc: Neck         Physical exam:  Gen: A and O x3, pleasant, well-groomed  Skin: No rashes or obvious lesions  HEENT: PERRLA, no obvious deformities on ears or in canals.Trachea midline.  CVS: Regular rate and rhythm, normal palpable pulses.  Resp: Clear to auscultation bilaterally, no wheezes or rales.  Abdomen: Soft, NT/ND.  Musculoskeletal: Able to heel walk, toe walk. No antalgic gait.     Neuro:  Motor:    Right Left   C4 Shoulder Abduction  5  5   C5 Elbow Flexion    5  5   C6 Wrist Extension  5  5   C7 Elbow Extension   5  5   C8/T1 Hand Intrinsics   5  5   C8 First Dorsal Interosseus  5  5   C8 Abductor Pollicus Brevis  5  5      Left  Right    Triceps DTR 2+ 1+   Biceps DTR 2+ 0+   Brachioradialis DTR 2+ 0+   Patellar DTR 2+ 1+   Achilles DTR 2+ 0+   Burton Absent  Absent   Clonus Absent Absent     Babinski Absent Absent       Cervical Spine:  Cervical spine: ROM is full in flexion, extension and lateral rotation  With increased pain on all movements.  Spurling's maneuver causes no neck pain to either side.  Myofascial exam: No Tenderness to palpation across cervical paraspinous region bilaterally.    Imagin/3/19 Xray C-spine:  There is straightening of the cervical spine with loss of normal lordosis.  There was a similar appearance on comparison plain films.  The vertebral bodies maintain normal height and alignment.  There is no change with flexion or extension.  There is marked disc space narrowing at the C4-5 through C6-7 levels where there is endplate sclerosis.  Large bulky anterior osteophytes are present from the C3-4 through C6-7 levels.  These findings were present previously but have progressed.  The odontoid process is intact.  The facet joints maintain normal articulation.  There is multilevel bilateral foraminal stenosis due to changes of uncovertebral spurring and " facet joint arthropathy.    10/15/20 Xray T-spine:  Slight scoliotic curvature lower thoracic spine.  No spondylolisthesis.  No displaced fracture with preserved vertebral body heights.  Intact pedicles.  Mild degenerative disc disease several mid thoracic levels with some non bridging anterior and lateral osteophyte formation.  No erosions.  No mineralization along the posterior longitudinal ligament.    10/15/20 Xray SI joints:  The sacroiliac joints are well aligned and well maintained.  No significant degenerative changes are present.  No erosive changes are present.  Other bones of the pelvis appear normal.     10/15/20 Xray L-spine: Slight scoliotic curvature of the lumbar spine.  No spondylolisthesis.  No displaced fracture with preserved vertebral body heights.  Mild degenerative disc disease at L2-3 through L4-5.  Facet degenerative change lowest 2 lumbar levels.    Assessment:   Patient is a 55-year-old woman with a history of hypothyroidism, ADD with a recent diagnosis of DISH presents in referral from Dr. [facility] for neck pain.      1. DISH (diffuse idiopathic skeletal hyperostosis)  MRI Cervical Spine Without Contrast   2. Cervical spondylosis  MRI Cervical Spine Without Contrast   3. Cervical radiculopathy  MRI Cervical Spine Without Contrast       Plan:    1.We reviewed her symptoms, reviewed her cervical spine x-ray.  We also reviewed her lumbar spine x-rays.I am going to order an MRI of her cervical spine, she has significant osteophytosis along the anterior spine, would like to see if she has any canal narrowing which could be causing some of her upper back pain and perhaps even heaviness in her legs.  Her reflexes are not noticeably changed in the lower legs so I doubt any myelopathy.  I will call her with the results of the MRI and we can discuss possible treatment options.  2. I did encourage her to go ahead and schedule sleep study.

## 2022-03-21 NOTE — PROGRESS NOTES
Health Maintenance Due   Topic Date Due    Shingles Vaccine (1 of 2) Never done    Influenza Vaccine (1) 09/01/2021    Lipid Panel  12/09/2021     Updates were requested from care everywhere.  Chart was reviewed for overdue Proactive [facility] Encounters (ARTURO) topics (CRS, Breast Cancer Screening, Eye exam)  Health Maintenance has been updated.  LINKS immunization registry triggered.  Immunizations were reconciled.'

## 2022-03-30 ENCOUNTER — PATIENT MESSAGE (OUTPATIENT)
Dept: PAIN MEDICINE | Facility: CLINIC | Age: 57
End: 2022-03-30
Payer: COMMERCIAL

## 2022-03-30 NOTE — TELEPHONE ENCOUNTER
Please let her know that I reviewed the cervical spine MRI and I was looking for any narrowing of the spinal canal to see if it would account for problems in the arms or even low back and legs that would cause the cramping that she was having.  Please let her know that she has some mild narrowing in certain areas but nothing that would cause the lower extremity issues, nothing majorly concerning.  She certainly has reason for some neck pain.  However the most significant finding is still the overgrowth of bone on the anterior surface of the spine which protrudes towards the esophagus and I think is causing swallowing issues.  I think if this swallowing issues are significant enough, she may need to see a neurosurgeon about this.  I would like to know the results of her sleep study as well to see if there is any concern for the spine causing some of the issues as well.  I do not think she needs any procedures right now but if her neck pain worsens and bothers her enough, we can set up an epidural steroid injection.  Otherwise the main concern is her swallowing and sleep apnea and if these are worsening, I will refer her to Neurosurgery.

## 2022-07-15 DIAGNOSIS — Z12.31 OTHER SCREENING MAMMOGRAM: ICD-10-CM

## 2022-07-18 ENCOUNTER — PATIENT MESSAGE (OUTPATIENT)
Dept: ADMINISTRATIVE | Facility: HOSPITAL | Age: 57
End: 2022-07-18
Payer: COMMERCIAL

## 2022-07-18 ENCOUNTER — PATIENT OUTREACH (OUTPATIENT)
Dept: ADMINISTRATIVE | Facility: HOSPITAL | Age: 57
End: 2022-07-18
Payer: COMMERCIAL

## 2022-07-18 NOTE — PROGRESS NOTES
BREAST CANCER SCREENING  Outreach to patient in reference to SCHEDULING A MAMMOGRAM EXAM.     Chart review completed:   Care Everywhere and Media reports - updates requested and reviewed.        MAMMOGRAM ORDER PLACED    ACTIVE PORTAL MESSAGE SENT OR LETTER MAILED

## 2022-07-22 ENCOUNTER — PATIENT MESSAGE (OUTPATIENT)
Dept: FAMILY MEDICINE | Facility: CLINIC | Age: 57
End: 2022-07-22
Payer: COMMERCIAL

## 2022-08-10 ENCOUNTER — HOSPITAL ENCOUNTER (OUTPATIENT)
Dept: RADIOLOGY | Facility: HOSPITAL | Age: 57
Discharge: HOME | End: 2022-08-10
Attending: NURSE PRACTITIONER
Payer: COMMERCIAL

## 2022-08-10 DIAGNOSIS — Z12.31 OTHER SCREENING MAMMOGRAM: ICD-10-CM

## 2022-08-10 PROCEDURE — 77063 MAMMO DIGITAL SCREENING BILAT WITH TOMO: ICD-10-PCS | Mod: 26,,, | Performed by: RADIOLOGY

## 2022-08-10 PROCEDURE — 77067 SCR MAMMO BI INCL CAD: CPT | Mod: TC,PO

## 2022-08-10 PROCEDURE — 77063 BREAST TOMOSYNTHESIS BI: CPT | Mod: TC,PO

## 2022-08-10 PROCEDURE — 77063 BREAST TOMOSYNTHESIS BI: CPT | Mod: 26,,, | Performed by: RADIOLOGY

## 2022-08-10 PROCEDURE — 77067 MAMMO DIGITAL SCREENING BILAT WITH TOMO: ICD-10-PCS | Mod: 26,,, | Performed by: RADIOLOGY

## 2022-08-10 PROCEDURE — 77067 SCR MAMMO BI INCL CAD: CPT | Mod: 26,,, | Performed by: RADIOLOGY

## 2022-08-16 ENCOUNTER — TELEPHONE (OUTPATIENT)
Dept: PAIN MEDICINE | Facility: CLINIC | Age: 57
End: 2022-08-16
Payer: COMMERCIAL

## 2022-08-16 NOTE — TELEPHONE ENCOUNTER
Left message for patient to give us a call to schedule appointment or she can log in to her [facility] to self schedule appointment

## 2023-03-08 ENCOUNTER — OFFICE VISIT (OUTPATIENT)
Dept: FAMILY MEDICINE | Facility: CLINIC | Age: 58
End: 2023-03-08
Payer: COMMERCIAL

## 2023-03-08 VITALS
TEMPERATURE: 98 F | OXYGEN SATURATION: 98 % | DIASTOLIC BLOOD PRESSURE: 60 MMHG | HEART RATE: 67 BPM | WEIGHT: 121.25 LBS | RESPIRATION RATE: 18 BRPM | BODY MASS INDEX: 22.31 KG/M2 | SYSTOLIC BLOOD PRESSURE: 108 MMHG | HEIGHT: 62 IN

## 2023-03-08 DIAGNOSIS — Z00.00 ANNUAL PHYSICAL EXAM: Primary | ICD-10-CM

## 2023-03-08 DIAGNOSIS — M48.10 DISH (DIFFUSE IDIOPATHIC SKELETAL HYPEROSTOSIS): ICD-10-CM

## 2023-03-08 DIAGNOSIS — F98.8 ATTENTION DEFICIT DISORDER (ADD) WITHOUT HYPERACTIVITY: ICD-10-CM

## 2023-03-08 DIAGNOSIS — E04.1 THYROID NODULE: ICD-10-CM

## 2023-03-08 DIAGNOSIS — F33.42 MAJOR DEPRESSIVE DISORDER, RECURRENT, IN FULL REMISSION: ICD-10-CM

## 2023-03-08 DIAGNOSIS — M46.90 INFLAMMATORY SPONDYLOPATHY, UNSPECIFIED SPINAL REGION: ICD-10-CM

## 2023-03-08 DIAGNOSIS — Z00.00 LABORATORY EXAM ORDERED AS PART OF ROUTINE GENERAL MEDICAL EXAMINATION: ICD-10-CM

## 2023-03-08 PROCEDURE — 84443 ASSAY THYROID STIM HORMONE: CPT | Performed by: NURSE PRACTITIONER

## 2023-03-08 PROCEDURE — 84480 ASSAY TRIIODOTHYRONINE (T3): CPT | Performed by: NURSE PRACTITIONER

## 2023-03-08 PROCEDURE — 3078F DIAST BP <80 MM HG: CPT | Mod: CPTII,S$GLB,, | Performed by: NURSE PRACTITIONER

## 2023-03-08 PROCEDURE — 36415 COLL VENOUS BLD VENIPUNCTURE: CPT | Mod: S$GLB,,, | Performed by: NURSE PRACTITIONER

## 2023-03-08 PROCEDURE — 36415 PR COLLECTION VENOUS BLOOD,VENIPUNCTURE: ICD-10-PCS | Mod: S$GLB,,, | Performed by: NURSE PRACTITIONER

## 2023-03-08 PROCEDURE — 80053 COMPREHEN METABOLIC PANEL: CPT | Performed by: NURSE PRACTITIONER

## 2023-03-08 PROCEDURE — 1160F RVW MEDS BY RX/DR IN RCRD: CPT | Mod: CPTII,S$GLB,, | Performed by: NURSE PRACTITIONER

## 2023-03-08 PROCEDURE — 1159F PR MEDICATION LIST DOCUMENTED IN MEDICAL RECORD: ICD-10-PCS | Mod: CPTII,S$GLB,, | Performed by: NURSE PRACTITIONER

## 2023-03-08 PROCEDURE — 3074F SYST BP LT 130 MM HG: CPT | Mod: CPTII,S$GLB,, | Performed by: NURSE PRACTITIONER

## 2023-03-08 PROCEDURE — 85025 COMPLETE CBC W/AUTO DIFF WBC: CPT | Performed by: NURSE PRACTITIONER

## 2023-03-08 PROCEDURE — 3008F PR BODY MASS INDEX (BMI) DOCUMENTED: ICD-10-PCS | Mod: CPTII,S$GLB,, | Performed by: NURSE PRACTITIONER

## 2023-03-08 PROCEDURE — 99396 PR PREVENTIVE VISIT,EST,40-64: ICD-10-PCS | Mod: S$GLB,,, | Performed by: NURSE PRACTITIONER

## 2023-03-08 PROCEDURE — 3078F PR MOST RECENT DIASTOLIC BLOOD PRESSURE < 80 MM HG: ICD-10-PCS | Mod: CPTII,S$GLB,, | Performed by: NURSE PRACTITIONER

## 2023-03-08 PROCEDURE — 99396 PREV VISIT EST AGE 40-64: CPT | Mod: S$GLB,,, | Performed by: NURSE PRACTITIONER

## 2023-03-08 PROCEDURE — 83036 HEMOGLOBIN GLYCOSYLATED A1C: CPT | Performed by: NURSE PRACTITIONER

## 2023-03-08 PROCEDURE — 3008F BODY MASS INDEX DOCD: CPT | Mod: CPTII,S$GLB,, | Performed by: NURSE PRACTITIONER

## 2023-03-08 PROCEDURE — 1159F MED LIST DOCD IN RCRD: CPT | Mod: CPTII,S$GLB,, | Performed by: NURSE PRACTITIONER

## 2023-03-08 PROCEDURE — 3074F PR MOST RECENT SYSTOLIC BLOOD PRESSURE < 130 MM HG: ICD-10-PCS | Mod: CPTII,S$GLB,, | Performed by: NURSE PRACTITIONER

## 2023-03-08 PROCEDURE — 80061 LIPID PANEL: CPT | Performed by: NURSE PRACTITIONER

## 2023-03-08 PROCEDURE — 84436 ASSAY OF TOTAL THYROXINE: CPT | Performed by: NURSE PRACTITIONER

## 2023-03-08 PROCEDURE — 1160F PR REVIEW ALL MEDS BY PRESCRIBER/CLIN PHARMACIST DOCUMENTED: ICD-10-PCS | Mod: CPTII,S$GLB,, | Performed by: NURSE PRACTITIONER

## 2023-03-08 RX ORDER — BACLOFEN 10 MG/1
10 TABLET ORAL 2 TIMES DAILY PRN
COMMUNITY
End: 2023-12-07 | Stop reason: SDUPTHER

## 2023-03-08 NOTE — PROGRESS NOTES
Subjective:       Patient ID: Glendy Andrade is a 57 y.o. female.    Chief Complaint: Annual Exam    HPI Annual exam. States she has been doing well. She has history of thyroid nodule. Has not had that checked in many years. She was diagnosed with DISH since our last visit.  She is just treating with Motrin and Baclofen on prn basis. She is due for routine labs. Current on her mammogram.    She had colonoscopy done 2018 with Dr. Wen. She did have polyps.  She needs to contact Dr. Wen to see if repeat colonoscopy should be done sooner than 10 years as indicated in [facility].     She has several chronic issues to review. See ROS/assessment and plan.     The following portion of the patients history was reviewed and updated as appropriate: allergies, current medications, past medical and surgical history. Past social history and problem list reviewed. Family PMH and Past social history reviewed. Tobacco, Illicit drug use reviewed.      Review of patient's allergies indicates:  No Known Allergies      Current Outpatient Medications:     ondansetron (ZOFRAN) 8 MG tablet, TAKE 1 TABLET BY MOUTH EVERY 6 TO 8 HOURS AS NEEDED FOR NAUSEA AND VOMITING, Disp: , Rfl:     Past Medical History:   Diagnosis Date    ADD (attention deficit disorder)     Depression     DISH (diffuse idiopathic skeletal hyperostosis)     High risk HPV infection     Hyperlipidemia     Hypothyroidism     Insomnia        Past Surgical History:   Procedure Laterality Date    BREAST BIOPSY Left      bening    THYROIDECTOMY      TONSILLECTOMY      TOTAL REDUCTION MAMMOPLASTY Bilateral     breast lift       Social History     Socioeconomic History    Marital status:    Tobacco Use    Smoking status: Former     Types: Cigarettes     Quit date: 3/19/2016     Years since quittin.9    Smokeless tobacco: Never   Substance and Sexual Activity    Alcohol use: Yes     Alcohol/week: 7.0 standard drinks     Types: 7 Glasses of wine per week    Drug  "use: No    Sexual activity: Yes     Partners: Male     Birth control/protection: Post-menopausal, None       Review of Systems   Constitutional:  Negative for fatigue and fever.   HENT:  Negative for congestion, postnasal drip, rhinorrhea and voice change.    Eyes:  Negative for visual disturbance.   Respiratory:  Negative for cough, chest tightness, shortness of breath and wheezing.    Cardiovascular:  Negative for chest pain, palpitations and leg swelling.   Gastrointestinal:  Negative for abdominal pain, blood in stool, constipation, diarrhea, nausea and vomiting.   Genitourinary:  Negative for difficulty urinating and dysuria.   Musculoskeletal:  Positive for arthralgias, myalgias and neck pain. Negative for back pain and gait problem.   Skin:  Negative for rash and wound.   Neurological:  Negative for dizziness, weakness and headaches.   Hematological:  Negative for adenopathy. Does not bruise/bleed easily.   Psychiatric/Behavioral:  Negative for decreased concentration, dysphoric mood and sleep disturbance. The patient is not nervous/anxious.      Objective:      /60   Pulse 67   Temp 98.4 °F (36.9 °C) (Temporal)   Resp 18   Ht 5' 2" (1.575 m)   Wt 55 kg (121 lb 4.1 oz)   SpO2 98%   BMI 22.18 kg/m²      Physical Exam  Vitals reviewed.   Constitutional:       General: She is not in acute distress.     Appearance: Normal appearance. She is well-developed and normal weight.   HENT:      Head: Normocephalic.      Mouth/Throat:      Mouth: Mucous membranes are moist.      Pharynx: Oropharynx is clear.   Eyes:      Conjunctiva/sclera: Conjunctivae normal.      Pupils: Pupils are equal, round, and reactive to light.   Neck:      Thyroid: No thyromegaly.      Vascular: No carotid bruit.      Trachea: Trachea normal. No tracheal tenderness or tracheal deviation.   Cardiovascular:      Rate and Rhythm: Normal rate and regular rhythm.      Pulses: Normal pulses.           Carotid pulses are 2+ on the right " side and 2+ on the left side.       Radial pulses are 2+ on the right side and 2+ on the left side.      Heart sounds: Normal heart sounds. No murmur heard.    No gallop.   Pulmonary:      Effort: Pulmonary effort is normal. No respiratory distress.      Breath sounds: Normal breath sounds. No stridor. No wheezing, rhonchi or rales.   Abdominal:      General: Bowel sounds are normal.      Palpations: Abdomen is soft. There is no splenomegaly or mass.      Tenderness: There is no abdominal tenderness. There is no guarding or rebound.   Musculoskeletal:         General: Normal range of motion.      Cervical back: Full passive range of motion without pain, normal range of motion and neck supple. No edema. Normal range of motion.      Right lower leg: No edema.      Left lower leg: No edema.      Comments: Gait and coordination normal.  strong, equal. Upper and lower extremity strength normal.    Skin:     General: Skin is warm and dry.      Capillary Refill: Capillary refill takes less than 2 seconds.      Findings: No lesion or rash.   Neurological:      General: No focal deficit present.      Mental Status: She is alert and oriented to person, place, and time.      Gait: Gait normal.   Psychiatric:         Attention and Perception: Attention and perception normal.         Mood and Affect: Mood and affect normal.         Speech: Speech normal.         Behavior: Behavior normal.       Assessment:       1. Annual physical exam    2. Inflammatory spondylopathy, unspecified spinal region    3. Major depressive disorder, recurrent, in full remission    4. Thyroid nodule    5. Laboratory exam ordered as part of routine general medical examination    6. DISH (diffuse idiopathic skeletal hyperostosis)    7. Attention deficit disorder (ADD) without hyperactivity        Plan:       Annual physical exam    Inflammatory spondylopathy, unspecified spinal region: followed by Pain management. Doing well    Major depressive  disorder, recurrent, in full remission:  stable without medication at this time    Thyroid nodule: due for repeat Thyroid US.  -     US Soft Tissue Head Neck Thyroid; Future; Expected date: 03/08/2023    Laboratory exam ordered as part of routine general medical examination  -     CBC Auto Differential  -     Comprehensive Metabolic Panel  -     Hemoglobin A1C  -     Lipid Panel  -     TSH  -     T4  -     T3    DISH (diffuse idiopathic skeletal hyperostosis): followed by pain management.     Attention deficit disorder (ADD) without hyperactivity: she was on ADD medications for years. Followed by Psychiatry. Has decided she does not want to take them anymore. She is taking a natural supplement at this time.      Continue current medication  Take medications only as prescribed  Healthy diet, exercise  Adequate rest  Adequate hydration  Avoid allergens  Avoid excessive caffeine      Follow up yearly

## 2023-03-09 ENCOUNTER — TELEPHONE (OUTPATIENT)
Dept: FAMILY MEDICINE | Facility: CLINIC | Age: 58
End: 2023-03-09
Payer: COMMERCIAL

## 2023-03-09 ENCOUNTER — PATIENT MESSAGE (OUTPATIENT)
Dept: FAMILY MEDICINE | Facility: CLINIC | Age: 58
End: 2023-03-09
Payer: COMMERCIAL

## 2023-03-09 LAB
ALBUMIN SERPL BCP-MCNC: 4.4 G/DL (ref 3.5–5.2)
ALP SERPL-CCNC: 78 U/L (ref 55–135)
ALT SERPL W/O P-5'-P-CCNC: 34 U/L (ref 10–44)
ANION GAP SERPL CALC-SCNC: 11 MMOL/L (ref 8–16)
AST SERPL-CCNC: 47 U/L (ref 10–40)
BASOPHILS # BLD AUTO: 0.02 K/UL (ref 0–0.2)
BASOPHILS NFR BLD: 0.3 % (ref 0–1.9)
BILIRUB SERPL-MCNC: 1.1 MG/DL (ref 0.1–1)
BUN SERPL-MCNC: 14 MG/DL (ref 6–20)
CALCIUM SERPL-MCNC: 9.7 MG/DL (ref 8.7–10.5)
CHLORIDE SERPL-SCNC: 104 MMOL/L (ref 95–110)
CHOLEST SERPL-MCNC: 270 MG/DL (ref 120–199)
CHOLEST/HDLC SERPL: 3.2 {RATIO} (ref 2–5)
CO2 SERPL-SCNC: 25 MMOL/L (ref 23–29)
CREAT SERPL-MCNC: 0.8 MG/DL (ref 0.5–1.4)
DIFFERENTIAL METHOD: ABNORMAL
EOSINOPHIL # BLD AUTO: 0.2 K/UL (ref 0–0.5)
EOSINOPHIL NFR BLD: 2.9 % (ref 0–8)
ERYTHROCYTE [DISTWIDTH] IN BLOOD BY AUTOMATED COUNT: 14.3 % (ref 11.5–14.5)
EST. GFR  (NO RACE VARIABLE): >60 ML/MIN/1.73 M^2
ESTIMATED AVG GLUCOSE: 88 MG/DL (ref 68–131)
GLUCOSE SERPL-MCNC: 86 MG/DL (ref 70–110)
HBA1C MFR BLD: 4.7 % (ref 4–5.6)
HCT VFR BLD AUTO: 49.9 % (ref 37–48.5)
HDLC SERPL-MCNC: 84 MG/DL (ref 40–75)
HDLC SERPL: 31.1 % (ref 20–50)
HGB BLD-MCNC: 16 G/DL (ref 12–16)
IMM GRANULOCYTES # BLD AUTO: 0.02 K/UL (ref 0–0.04)
IMM GRANULOCYTES NFR BLD AUTO: 0.3 % (ref 0–0.5)
LDLC SERPL CALC-MCNC: 168.8 MG/DL (ref 63–159)
LYMPHOCYTES # BLD AUTO: 1.2 K/UL (ref 1–4.8)
LYMPHOCYTES NFR BLD: 20.1 % (ref 18–48)
MCH RBC QN AUTO: 32.4 PG (ref 27–31)
MCHC RBC AUTO-ENTMCNC: 32.1 G/DL (ref 32–36)
MCV RBC AUTO: 101 FL (ref 82–98)
MONOCYTES # BLD AUTO: 0.5 K/UL (ref 0.3–1)
MONOCYTES NFR BLD: 8.4 % (ref 4–15)
NEUTROPHILS # BLD AUTO: 4 K/UL (ref 1.8–7.7)
NEUTROPHILS NFR BLD: 68 % (ref 38–73)
NONHDLC SERPL-MCNC: 186 MG/DL
NRBC BLD-RTO: 0 /100 WBC
PLATELET # BLD AUTO: 301 K/UL (ref 150–450)
PMV BLD AUTO: 11.2 FL (ref 9.2–12.9)
POTASSIUM SERPL-SCNC: 5.2 MMOL/L (ref 3.5–5.1)
PROT SERPL-MCNC: 7.2 G/DL (ref 6–8.4)
RBC # BLD AUTO: 4.94 M/UL (ref 4–5.4)
SODIUM SERPL-SCNC: 140 MMOL/L (ref 136–145)
T3 SERPL-MCNC: 80 NG/DL (ref 60–180)
T4 SERPL-MCNC: 6.6 UG/DL (ref 4.5–11.5)
TRIGL SERPL-MCNC: 86 MG/DL (ref 30–150)
TSH SERPL DL<=0.005 MIU/L-ACNC: 1.88 UIU/ML (ref 0.4–4)
WBC # BLD AUTO: 5.83 K/UL (ref 3.9–12.7)

## 2023-03-09 NOTE — TELEPHONE ENCOUNTER
----- Message from Shannan Jasper sent at 3/9/2023 10:38 AM CST -----  Contact: patient  Type:  Needs Medical Advice    Who Called:  Patient     Would the patient rather a call back or a response via [facility]? Call     Best Call Back Number: [phone] (home)      Additional Information:  Patient would like to speak with the nurse in regards to some questions that she has about her test results that she seen on the portal.     Please call to advise

## 2023-03-09 NOTE — TELEPHONE ENCOUNTER
Returned pts call and informed her that her labs just got resulted this AM at 0600 and Marilyn has not even had time to review them yet.  Informed pt that providers have up to 7 days to get back to them for non urgent results and 24 hours for critical results.  Pt verbalizes understanding.    She is concern about her cholesterol and wants to know if she needs a med for that.

## 2023-03-10 ENCOUNTER — PATIENT MESSAGE (OUTPATIENT)
Dept: FAMILY MEDICINE | Facility: CLINIC | Age: 58
End: 2023-03-10
Payer: COMMERCIAL

## 2023-03-10 RX ORDER — ROSUVASTATIN CALCIUM 10 MG/1
10 TABLET, COATED ORAL DAILY
Qty: 90 TABLET | Refills: 3 | Status: SHIPPED | OUTPATIENT
Start: 2023-03-10 | End: 2024-03-22

## 2023-06-05 ENCOUNTER — PATIENT MESSAGE (OUTPATIENT)
Dept: FAMILY MEDICINE | Facility: CLINIC | Age: 58
End: 2023-06-05
Payer: COMMERCIAL

## 2023-06-15 LAB — CRC RECOMMENDATION EXT: NORMAL

## 2023-06-21 ENCOUNTER — PATIENT OUTREACH (OUTPATIENT)
Dept: ADMINISTRATIVE | Facility: HOSPITAL | Age: 58
End: 2023-06-21
Payer: COMMERCIAL

## 2023-08-22 ENCOUNTER — TELEPHONE (OUTPATIENT)
Dept: FAMILY MEDICINE | Facility: CLINIC | Age: 58
End: 2023-08-22

## 2023-08-22 ENCOUNTER — PATIENT MESSAGE (OUTPATIENT)
Dept: ADMINISTRATIVE | Facility: HOSPITAL | Age: 58
End: 2023-08-22
Payer: COMMERCIAL

## 2023-08-22 ENCOUNTER — PATIENT OUTREACH (OUTPATIENT)
Dept: ADMINISTRATIVE | Facility: HOSPITAL | Age: 58
End: 2023-08-22
Payer: COMMERCIAL

## 2023-08-22 DIAGNOSIS — Z12.31 OTHER SCREENING MAMMOGRAM: ICD-10-CM

## 2023-08-22 NOTE — PROGRESS NOTES
BREAST CANCER SCREENING    Non-compliant report chart audits for BREAST CANCER SCREENING     Outreach to patient in reference to SCHEDULING A MAMMOGRAM EXAM.

## 2023-09-26 ENCOUNTER — TELEPHONE (OUTPATIENT)
Dept: FAMILY MEDICINE | Facility: CLINIC | Age: 58
End: 2023-09-26
Payer: COMMERCIAL

## 2023-09-26 NOTE — TELEPHONE ENCOUNTER
----- Message from Geraldo Kearney sent at 9/26/2023  9:10 AM CDT -----  Regarding: appt  Type:  Sooner Apoointment Request      Name of Caller:pt    When is the first available appointment?9/29    Symptoms:uti      Best Call Back Number:[phone]      Additional Information: pt wants to be seen today. please call to discuss.

## 2023-09-28 ENCOUNTER — TELEPHONE (OUTPATIENT)
Dept: FAMILY MEDICINE | Facility: CLINIC | Age: 58
End: 2023-09-28
Payer: COMMERCIAL

## 2023-10-06 ENCOUNTER — OFFICE VISIT (OUTPATIENT)
Dept: UROLOGY | Facility: CLINIC | Age: 58
End: 2023-10-06
Payer: COMMERCIAL

## 2023-10-06 VITALS — WEIGHT: 123.88 LBS | BODY MASS INDEX: 22.8 KG/M2 | HEIGHT: 62 IN

## 2023-10-06 DIAGNOSIS — N20.0 KIDNEY STONES: Primary | ICD-10-CM

## 2023-10-06 PROCEDURE — 99204 PR OFFICE/OUTPT VISIT, NEW, LEVL IV, 45-59 MIN: ICD-10-PCS | Mod: S$GLB,,,

## 2023-10-06 PROCEDURE — 3044F PR MOST RECENT HEMOGLOBIN A1C LEVEL <7.0%: ICD-10-PCS | Mod: CPTII,S$GLB,,

## 2023-10-06 PROCEDURE — 3008F BODY MASS INDEX DOCD: CPT | Mod: CPTII,S$GLB,,

## 2023-10-06 PROCEDURE — 3044F HG A1C LEVEL LT 7.0%: CPT | Mod: CPTII,S$GLB,,

## 2023-10-06 PROCEDURE — 3008F PR BODY MASS INDEX (BMI) DOCUMENTED: ICD-10-PCS | Mod: CPTII,S$GLB,,

## 2023-10-06 PROCEDURE — 99999 PR PBB SHADOW E&M-EST. PATIENT-LVL III: CPT | Mod: PBBFAC,,,

## 2023-10-06 PROCEDURE — 1159F MED LIST DOCD IN RCRD: CPT | Mod: CPTII,S$GLB,,

## 2023-10-06 PROCEDURE — 1159F PR MEDICATION LIST DOCUMENTED IN MEDICAL RECORD: ICD-10-PCS | Mod: CPTII,S$GLB,,

## 2023-10-06 PROCEDURE — 99204 OFFICE O/P NEW MOD 45 MIN: CPT | Mod: S$GLB,,,

## 2023-10-06 PROCEDURE — 99999 PR PBB SHADOW E&M-EST. PATIENT-LVL III: ICD-10-PCS | Mod: PBBFAC,,,

## 2023-10-06 RX ORDER — LISDEXAMFETAMINE DIMESYLATE 30 MG/1
30 CAPSULE ORAL
COMMUNITY
Start: 2023-09-18

## 2023-10-06 NOTE — PROGRESS NOTES
[facility] Note  Staff: Sho Gr FNP-C    PCP: AMERICO Valenzuela    Chief Complaint:  Kidney stone    Subjective:        HPI: Glendy Andrade is a 57 y.o. female NEW PATIENT presents today for evaluation of kidney stone.  She presented to [facility] ED on 09/29/2023 with nausea, right flank pain, and gross hematuria.  A CT abdomen pelvis without contrast showed a 2 mm distal right ureteral stone without hydronephrosis.  She brought a disc with her today.  All images were reviewed with her.  She was discharged home with tamsulosin, Norco, and Zofran.  She states all symptoms have resolved.  She denies dysuria, hematuria, fever, flank pain, abdominal pain, urgency, frequency, incontinence, and difficulty urinating.  She states she was given a urinary strainer but admits to not using it with every urination.  She denies a prior history of kidney stones.    Questions asked pt during office visit today:  Urgency:No, incontinence with urgency? No;   DysuriaNo  Gross HematuriaNo  History of UTI: Yes     History of Kidney Stones?:  No    Constipation issues?:  No    REVIEW OF SYSTEMS:  Review of Systems   Constitutional: Negative.  Negative for chills and fever.   HENT: Negative.     Eyes: Negative.    Respiratory: Negative.     Cardiovascular: Negative.    Gastrointestinal: Negative.  Negative for abdominal pain, constipation, diarrhea, nausea and vomiting.   Genitourinary: Negative.  Negative for dysuria, flank pain, frequency, hematuria and urgency.   Musculoskeletal: Negative.  Negative for back pain.   Skin: Negative.    Neurological: Negative.    Endo/Heme/Allergies: Negative.    Psychiatric/Behavioral: Negative.         PMHx:  Past Medical History:   Diagnosis Date    ADD (attention deficit disorder)     Depression     DISH (diffuse idiopathic skeletal hyperostosis)     High risk HPV infection     Hyperlipidemia     Insomnia     Personal history of colonic polyps 06/15/2023    Thyroid nodule         PSHx:  Past Surgical History:   Procedure Laterality Date    BREAST BIOPSY Left     2011 [place]    THYROIDECTOMY      TONSILLECTOMY      TOTAL REDUCTION MAMMOPLASTY Bilateral 2020    breast lift       Fam Hx:   malignancies: No , gyn malignancies: No   kidney stones: Yes - son     Soc Hx:  , lives in [place]    Allergies:  Patient has no known allergies.    Medications: reviewed     Objective:   There were no vitals filed for this visit.    Physical Exam  Constitutional:       Appearance: Normal appearance.   HENT:      Head: Normocephalic.      Mouth/Throat:      Mouth: Mucous membranes are moist.   Eyes:      Conjunctiva/sclera: Conjunctivae normal.   Pulmonary:      Effort: Pulmonary effort is normal.   Abdominal:      General: There is no distension.      Palpations: Abdomen is soft.      Tenderness: There is no abdominal tenderness. There is no right CVA tenderness or left CVA tenderness.   Musculoskeletal:         General: Normal range of motion.      Cervical back: Normal range of motion.   Skin:     General: Skin is warm.   Neurological:      Mental Status: She is alert and oriented to person, place, and time.   Psychiatric:         Mood and Affect: Mood normal.         Behavior: Behavior normal.           LABS REVIEW:  UA today:   Color:Clear, Yellow  Spec. Grav.  1.020  PH  7.0  Negative for leukocytes, nitrates, protein, glucose, ketones, urobili, bili  Trace blood    Assessment:       1. Kidney stones          Plan:      Things you can do to decrease your risk of recurring stones:  1. Increase fluid intake - You should be producing at least 2.5 L of urine per 24 hour period. If your urine is dark you need to be drinking more water.  2. Lower sodium intake - this helps lower the amount of calcium being lost in your urine. An ideal intake is between 2300 and 3300mg of sodium daily.  3. Normal calcium diet - ideal intake is between 800 and 1200mg of calcium daily. You do not want to  decrease the amount of calcium you take in because this can actually increase your risk of making stone.     Limit iced tea and kem,  avoid Tums and Rolaids, to avoid Vitamin C supplementation and to limit salt and red meat intake.      F/u As Needed    [facility]: Active    Sho Gr, TRACY

## 2023-12-07 ENCOUNTER — OFFICE VISIT (OUTPATIENT)
Dept: FAMILY MEDICINE | Facility: CLINIC | Age: 58
End: 2023-12-07
Payer: COMMERCIAL

## 2023-12-07 DIAGNOSIS — E78.2 MIXED HYPERLIPIDEMIA: ICD-10-CM

## 2023-12-07 DIAGNOSIS — R17 HIGH SERUM TOTAL BILIRUBIN: ICD-10-CM

## 2023-12-07 DIAGNOSIS — Z00.00 PREVENTATIVE HEALTH CARE: Primary | ICD-10-CM

## 2023-12-07 DIAGNOSIS — D75.89 MACROCYTOSIS WITHOUT ANEMIA: ICD-10-CM

## 2023-12-07 DIAGNOSIS — E89.0 H/O PARTIAL THYROIDECTOMY: ICD-10-CM

## 2023-12-07 DIAGNOSIS — Z13.1 ENCOUNTER FOR SCREENING FOR DIABETES MELLITUS: ICD-10-CM

## 2023-12-07 DIAGNOSIS — E87.5 HIGH SERUM POTASSIUM LEVEL: ICD-10-CM

## 2023-12-07 DIAGNOSIS — M48.10 DISH (DIFFUSE IDIOPATHIC SKELETAL HYPEROSTOSIS): Chronic | ICD-10-CM

## 2023-12-07 DIAGNOSIS — R74.01 HIGH SERUM ASPARTATE AMINOTRANSFERASE (AST) LEVEL: ICD-10-CM

## 2023-12-07 PROBLEM — F33.42 MAJOR DEPRESSIVE DISORDER, RECURRENT, IN FULL REMISSION: Chronic | Status: ACTIVE | Noted: 2023-03-08

## 2023-12-07 PROBLEM — M46.90 INFLAMMATORY SPONDYLOPATHY, UNSPECIFIED SPINAL REGION: Chronic | Status: ACTIVE | Noted: 2023-03-08

## 2023-12-07 PROCEDURE — 99396 PREV VISIT EST AGE 40-64: CPT | Mod: S$GLB,,, | Performed by: STUDENT IN AN ORGANIZED HEALTH CARE EDUCATION/TRAINING PROGRAM

## 2023-12-07 PROCEDURE — 99214 OFFICE O/P EST MOD 30 MIN: CPT | Mod: 25,S$GLB,, | Performed by: STUDENT IN AN ORGANIZED HEALTH CARE EDUCATION/TRAINING PROGRAM

## 2023-12-07 PROCEDURE — 3044F HG A1C LEVEL LT 7.0%: CPT | Mod: CPTII,S$GLB,, | Performed by: STUDENT IN AN ORGANIZED HEALTH CARE EDUCATION/TRAINING PROGRAM

## 2023-12-07 PROCEDURE — 99214 PR OFFICE/OUTPT VISIT, EST, LEVL IV, 30-39 MIN: ICD-10-PCS | Mod: 25,S$GLB,, | Performed by: STUDENT IN AN ORGANIZED HEALTH CARE EDUCATION/TRAINING PROGRAM

## 2023-12-07 PROCEDURE — 99999 PR PBB SHADOW E&M-EST. PATIENT-LVL III: ICD-10-PCS | Mod: PBBFAC,,, | Performed by: STUDENT IN AN ORGANIZED HEALTH CARE EDUCATION/TRAINING PROGRAM

## 2023-12-07 PROCEDURE — 99396 PR PREVENTIVE VISIT,EST,40-64: ICD-10-PCS | Mod: S$GLB,,, | Performed by: STUDENT IN AN ORGANIZED HEALTH CARE EDUCATION/TRAINING PROGRAM

## 2023-12-07 PROCEDURE — 3044F PR MOST RECENT HEMOGLOBIN A1C LEVEL <7.0%: ICD-10-PCS | Mod: CPTII,S$GLB,, | Performed by: STUDENT IN AN ORGANIZED HEALTH CARE EDUCATION/TRAINING PROGRAM

## 2023-12-07 PROCEDURE — 99999 PR PBB SHADOW E&M-EST. PATIENT-LVL III: CPT | Mod: PBBFAC,,, | Performed by: STUDENT IN AN ORGANIZED HEALTH CARE EDUCATION/TRAINING PROGRAM

## 2023-12-07 RX ORDER — BACLOFEN 10 MG/1
10 TABLET ORAL 2 TIMES DAILY PRN
Qty: 90 TABLET | Refills: 1 | Status: SHIPPED | OUTPATIENT
Start: 2023-12-07 | End: 2024-06-04

## 2023-12-07 NOTE — PROGRESS NOTES
Plan:     Glendy was seen today for establish care.    Diagnoses and all orders for this visit:    Mixed hyperlipidemia  -     Lipid Panel; Future    Macrocytosis without anemia  -     CBC Auto Differential; Future  -     Vitamin B12; Future  -     FOLATE; Future    High serum potassium level  -     Comprehensive Metabolic Panel; Future    High serum aspartate aminotransferase (AST) level  -     Comprehensive Metabolic Panel; Future    High serum total bilirubin  -     Comprehensive Metabolic Panel; Future    Preventative health care  Discussed age appropriate preventative healthcare items such as cancer screenings and recommended immunizations. Discussed whether patient is using tobacco, alcohol, or illicit drugs and any concerns were discussed. Discussed maintenance of a healthy weight. Patient queried if she has any additional questions about preventative healthcare and all questions were answered.  -     Hemoglobin A1C; Future  -     Lipid Panel; Future  -     Comprehensive Metabolic Panel; Future  -     CBC Auto Differential; Future    Encounter for screening for diabetes mellitus  -     Hemoglobin A1C; Future    DISH (diffuse idiopathic skeletal hyperostosis)  -     baclofen (LIORESAL) 10 MG tablet; Take 1 tablet (10 mg total) by mouth 2 (two) times daily as needed (muscle spasms).    H/O partial thyroidectomy  -     TSH; Future    Declines flu shot at this time.    Follow up in about 4 weeks (around 1/4/2024), or if symptoms worsen or fail to improve.    Jillian Rosen MD  12/07/2023    Subjective:      Patient ID: Glendy Andrade is a 58 y.o. female    Chief Complaint   Patient presents with    Establish Care     HPI  58 y.o. female with a PMHx as documented below presents to clinic today for the following:    ADHD:   - Vyvanse 30 mg daily - tolerating well without complication    HLD:   - Lipid panel (3/8/32) w/ elevated total cholesterol 270, triglycerides wnl 86, elevated HDL 84, elevated .8  -  Previous Rx: Crestor 10 mg daily (no reported side effects)    DISH:   - Managed w/ OTC anti-inflammatories and stretching/yoga  - Baclofen 10 mg BID PRN    ROS  Constitutional:  Negative for chills and fever.   Respiratory:  Negative for shortness of breath.    Cardiovascular:  Negative for chest pain.   Gastrointestinal:  Negative for abdominal pain, constipation, diarrhea, nausea and vomiting.     Current Outpatient Medications   Medication Instructions    baclofen (LIORESAL) 10 mg, Oral, 2 times daily PRN    ondansetron (ZOFRAN) 8 MG tablet TAKE 1 TABLET BY MOUTH EVERY 6 TO 8 HOURS AS NEEDED FOR NAUSEA AND VOMITING    rosuvastatin (CRESTOR) 10 mg, Oral, Daily    VYVANSE 30 mg, Oral      Past Medical History:   Diagnosis Date    ADD (attention deficit disorder)     Depression     DISH (diffuse idiopathic skeletal hyperostosis)     High risk HPV infection     Hyperlipidemia     Inflammatory spondylopathy, unspecified spinal region 2023    Insomnia     Nephrolithiasis 2023    Records @ [facility]; CT abd/pelvis w/o contrast w/ 2 mm distal right ureteral stone w/o hydronephrosis; resolved w/o complication; cleared by Urology f/u outpatient    Personal history of colonic polyps 06/15/2023    Thyroid nodule      Past Surgical History:   Procedure Laterality Date    BREAST BIOPSY Left 2011    benign    THYROIDECTOMY      TONSILLECTOMY      TOTAL REDUCTION MAMMOPLASTY Bilateral     breast lift     Review of patient's allergies indicates:  No Known Allergies    Family History   Problem Relation Age of Onset    Hypertension Father     Arthritis Maternal Grandmother     Diabetes Maternal Grandmother     Diabetes Paternal Grandmother      Social History     Tobacco Use    Smoking status: Former     Current packs/day: 0.00     Types: Cigarettes     Quit date: 3/19/2016     Years since quittin.7    Smokeless tobacco: Never   Substance Use Topics    Alcohol use: Yes     Alcohol/week: 7.0 standard drinks of  "alcohol     Types: 7 Glasses of wine per week    Drug use: No     Currently on File with [facility]:   Most Recent Immunizations   Administered Date(s) Administered    COVID-19 Vaccine 01/11/2022    COVID-19, MRNA, LN-S, PF (MODERNA FULL 0.5 ML DOSE) 01/11/2022    Tdap 03/19/2018     Objective:      Vitals:    12/07/23 1044   BP: (P) 114/70   Pulse: (P) 64   Weight: (P) 54.9 kg (121 lb 0.5 oz)   Height: (P) 5' 3" (1.6 m)     Body mass index is 21.44 kg/m² (pended).    Physical Exam   Constitutional:       General: No acute distress.  HENT:      Head: Normocephalic and atraumatic.   Pulmonary:      Effort: Pulmonary effort is normal. No respiratory distress.   Neurological:      General: No focal deficit present.      Mental Status: Alert and oriented to person, place, and time. Mental status is at baseline.    Assessment:       1. Mixed hyperlipidemia    2. Macrocytosis without anemia    3. High serum potassium level    4. High serum aspartate aminotransferase (AST) level    5. High serum total bilirubin    6. Preventative health care    7. Encounter for screening for diabetes mellitus    8. DISH (diffuse idiopathic skeletal hyperostosis)    9. H/O partial thyroidectomy        Jillian Rosen MD  [facility]  Office: [phone]   Fax: [phone]  12/07/2023      Disclaimer: This note was partly generated using dictation software which may occasionally result in transcription errors.    Total time spent on this encounter includes face to face time and non-face to face time preparing to see the patient (eg, review of tests), obtaining and/or reviewing separately obtained history, documenting clinical information in the electronic or other health record, independently interpreting results, and communicating results to the patient/family/caregiver, or care coordinator.    "

## 2023-12-13 ENCOUNTER — LAB VISIT (OUTPATIENT)
Dept: LAB | Facility: HOSPITAL | Age: 58
End: 2023-12-13
Payer: COMMERCIAL

## 2023-12-13 DIAGNOSIS — R17 HIGH SERUM TOTAL BILIRUBIN: ICD-10-CM

## 2023-12-13 DIAGNOSIS — Z00.00 PREVENTATIVE HEALTH CARE: ICD-10-CM

## 2023-12-13 DIAGNOSIS — E89.0 H/O PARTIAL THYROIDECTOMY: ICD-10-CM

## 2023-12-13 DIAGNOSIS — D75.89 MACROCYTOSIS WITHOUT ANEMIA: ICD-10-CM

## 2023-12-13 DIAGNOSIS — E87.5 HIGH SERUM POTASSIUM LEVEL: ICD-10-CM

## 2023-12-13 DIAGNOSIS — Z13.1 ENCOUNTER FOR SCREENING FOR DIABETES MELLITUS: ICD-10-CM

## 2023-12-13 DIAGNOSIS — E78.2 MIXED HYPERLIPIDEMIA: ICD-10-CM

## 2023-12-13 DIAGNOSIS — R74.01 HIGH SERUM ASPARTATE AMINOTRANSFERASE (AST) LEVEL: ICD-10-CM

## 2023-12-13 LAB
ALBUMIN SERPL BCP-MCNC: 4.2 G/DL (ref 3.5–5.2)
ALP SERPL-CCNC: 35 U/L (ref 55–135)
ALT SERPL W/O P-5'-P-CCNC: 9 U/L (ref 10–44)
ANION GAP SERPL CALC-SCNC: 8 MMOL/L (ref 8–16)
AST SERPL-CCNC: 17 U/L (ref 10–40)
BASOPHILS # BLD AUTO: 0.02 K/UL (ref 0–0.2)
BASOPHILS NFR BLD: 0.5 % (ref 0–1.9)
BILIRUB SERPL-MCNC: 1.5 MG/DL (ref 0.1–1)
BUN SERPL-MCNC: 12 MG/DL (ref 6–20)
CALCIUM SERPL-MCNC: 8.8 MG/DL (ref 8.7–10.5)
CHLORIDE SERPL-SCNC: 106 MMOL/L (ref 95–110)
CHOLEST SERPL-MCNC: 238 MG/DL (ref 120–199)
CHOLEST/HDLC SERPL: 3.1 {RATIO} (ref 2–5)
CO2 SERPL-SCNC: 24 MMOL/L (ref 23–29)
CREAT SERPL-MCNC: 1 MG/DL (ref 0.5–1.4)
DIFFERENTIAL METHOD: ABNORMAL
EOSINOPHIL # BLD AUTO: 0.1 K/UL (ref 0–0.5)
EOSINOPHIL NFR BLD: 2.3 % (ref 0–8)
ERYTHROCYTE [DISTWIDTH] IN BLOOD BY AUTOMATED COUNT: 13.5 % (ref 11.5–14.5)
EST. GFR  (NO RACE VARIABLE): >60 ML/MIN/1.73 M^2
ESTIMATED AVG GLUCOSE: 94 MG/DL (ref 68–131)
FOLATE SERPL-MCNC: 11.5 NG/ML (ref 4–24)
GLUCOSE SERPL-MCNC: 75 MG/DL (ref 70–110)
HBA1C MFR BLD: 4.9 % (ref 4–5.6)
HCT VFR BLD AUTO: 46.3 % (ref 37–48.5)
HDLC SERPL-MCNC: 77 MG/DL (ref 40–75)
HDLC SERPL: 32.4 % (ref 20–50)
HGB BLD-MCNC: 15.6 G/DL (ref 12–16)
IMM GRANULOCYTES # BLD AUTO: 0.01 K/UL (ref 0–0.04)
IMM GRANULOCYTES NFR BLD AUTO: 0.3 % (ref 0–0.5)
LDLC SERPL CALC-MCNC: 147.4 MG/DL (ref 63–159)
LYMPHOCYTES # BLD AUTO: 1.3 K/UL (ref 1–4.8)
LYMPHOCYTES NFR BLD: 32 % (ref 18–48)
MCH RBC QN AUTO: 33.2 PG (ref 27–31)
MCHC RBC AUTO-ENTMCNC: 33.7 G/DL (ref 32–36)
MCV RBC AUTO: 99 FL (ref 82–98)
MONOCYTES # BLD AUTO: 0.4 K/UL (ref 0.3–1)
MONOCYTES NFR BLD: 10.8 % (ref 4–15)
NEUTROPHILS # BLD AUTO: 2.2 K/UL (ref 1.8–7.7)
NEUTROPHILS NFR BLD: 54.1 % (ref 38–73)
NONHDLC SERPL-MCNC: 161 MG/DL
NRBC BLD-RTO: 0 /100 WBC
PLATELET # BLD AUTO: 271 K/UL (ref 150–450)
PMV BLD AUTO: 11.3 FL (ref 9.2–12.9)
POTASSIUM SERPL-SCNC: 3.9 MMOL/L (ref 3.5–5.1)
PROT SERPL-MCNC: 7.2 G/DL (ref 6–8.4)
RBC # BLD AUTO: 4.7 M/UL (ref 4–5.4)
SODIUM SERPL-SCNC: 138 MMOL/L (ref 136–145)
TRIGL SERPL-MCNC: 68 MG/DL (ref 30–150)
TSH SERPL DL<=0.005 MIU/L-ACNC: 2.06 UIU/ML (ref 0.4–4)
VIT B12 SERPL-MCNC: 415 PG/ML (ref 210–950)
WBC # BLD AUTO: 4 K/UL (ref 3.9–12.7)

## 2023-12-13 PROCEDURE — 36415 COLL VENOUS BLD VENIPUNCTURE: CPT | Mod: PN | Performed by: STUDENT IN AN ORGANIZED HEALTH CARE EDUCATION/TRAINING PROGRAM

## 2023-12-13 PROCEDURE — 85025 COMPLETE CBC W/AUTO DIFF WBC: CPT | Performed by: STUDENT IN AN ORGANIZED HEALTH CARE EDUCATION/TRAINING PROGRAM

## 2023-12-13 PROCEDURE — 82607 VITAMIN B-12: CPT | Performed by: STUDENT IN AN ORGANIZED HEALTH CARE EDUCATION/TRAINING PROGRAM

## 2023-12-13 PROCEDURE — 82746 ASSAY OF FOLIC ACID SERUM: CPT | Performed by: STUDENT IN AN ORGANIZED HEALTH CARE EDUCATION/TRAINING PROGRAM

## 2023-12-13 PROCEDURE — 80053 COMPREHEN METABOLIC PANEL: CPT | Performed by: STUDENT IN AN ORGANIZED HEALTH CARE EDUCATION/TRAINING PROGRAM

## 2023-12-13 PROCEDURE — 80061 LIPID PANEL: CPT | Performed by: STUDENT IN AN ORGANIZED HEALTH CARE EDUCATION/TRAINING PROGRAM

## 2023-12-13 PROCEDURE — 84443 ASSAY THYROID STIM HORMONE: CPT | Performed by: STUDENT IN AN ORGANIZED HEALTH CARE EDUCATION/TRAINING PROGRAM

## 2023-12-13 PROCEDURE — 83036 HEMOGLOBIN GLYCOSYLATED A1C: CPT | Performed by: STUDENT IN AN ORGANIZED HEALTH CARE EDUCATION/TRAINING PROGRAM

## 2023-12-21 ENCOUNTER — TELEPHONE (OUTPATIENT)
Dept: FAMILY MEDICINE | Facility: CLINIC | Age: 58
End: 2023-12-21
Payer: COMMERCIAL

## 2023-12-21 NOTE — TELEPHONE ENCOUNTER
----- Message from Jillian Rosen MD sent at 12/21/2023  2:05 PM CST -----  Please call the patient and have them schedule an appointment at their earliest convenience to discuss their lab results (virtual okay). Thanks!

## 2024-03-04 ENCOUNTER — OFFICE VISIT (OUTPATIENT)
Dept: PAIN MEDICINE | Facility: CLINIC | Age: 59
End: 2024-03-04
Payer: COMMERCIAL

## 2024-03-04 VITALS
WEIGHT: 118.81 LBS | DIASTOLIC BLOOD PRESSURE: 58 MMHG | SYSTOLIC BLOOD PRESSURE: 105 MMHG | BODY MASS INDEX: 21.05 KG/M2 | HEIGHT: 63 IN | HEART RATE: 85 BPM

## 2024-03-04 DIAGNOSIS — M54.12 CERVICAL RADICULOPATHY: ICD-10-CM

## 2024-03-04 DIAGNOSIS — M51.36 DDD (DEGENERATIVE DISC DISEASE), LUMBAR: ICD-10-CM

## 2024-03-04 DIAGNOSIS — M48.10 DISH (DIFFUSE IDIOPATHIC SKELETAL HYPEROSTOSIS): Primary | ICD-10-CM

## 2024-03-04 DIAGNOSIS — M47.816 LUMBAR SPONDYLOSIS: ICD-10-CM

## 2024-03-04 DIAGNOSIS — M47.812 CERVICAL SPONDYLOSIS: ICD-10-CM

## 2024-03-04 PROCEDURE — 3008F BODY MASS INDEX DOCD: CPT | Mod: CPTII,S$GLB,, | Performed by: ANESTHESIOLOGY

## 2024-03-04 PROCEDURE — 3074F SYST BP LT 130 MM HG: CPT | Mod: CPTII,S$GLB,, | Performed by: ANESTHESIOLOGY

## 2024-03-04 PROCEDURE — 99999 PR PBB SHADOW E&M-EST. PATIENT-LVL III: CPT | Mod: PBBFAC,,, | Performed by: ANESTHESIOLOGY

## 2024-03-04 PROCEDURE — 1159F MED LIST DOCD IN RCRD: CPT | Mod: CPTII,S$GLB,, | Performed by: ANESTHESIOLOGY

## 2024-03-04 PROCEDURE — 99214 OFFICE O/P EST MOD 30 MIN: CPT | Mod: S$GLB,,, | Performed by: ANESTHESIOLOGY

## 2024-03-04 PROCEDURE — 3078F DIAST BP <80 MM HG: CPT | Mod: CPTII,S$GLB,, | Performed by: ANESTHESIOLOGY

## 2024-03-04 RX ORDER — METHYLPREDNISOLONE ACETATE 40 MG/ML
40 INJECTION, SUSPENSION INTRA-ARTICULAR; INTRALESIONAL; INTRAMUSCULAR; SOFT TISSUE
Status: COMPLETED | OUTPATIENT
Start: 2024-03-04 | End: 2024-03-04

## 2024-03-04 RX ADMIN — METHYLPREDNISOLONE ACETATE 40 MG: 40 INJECTION, SUSPENSION INTRA-ARTICULAR; INTRALESIONAL; INTRAMUSCULAR; SOFT TISSUE at 08:03

## 2024-03-04 NOTE — PROGRESS NOTES
This note was completed with dictation software and grammatical errors may exist.    CC:  Pain    HPI:  Patient is a 58-year-old woman with a history of hypothyroidism, ADD with a diagnosis of DISH originally presented in referral from Dr. [facility]un [facility] for neck pain.  She presents today with right low back pain.  She states that is began several weeks ago, is located in the right buttock and right lateral thigh down to the knee.  She states that it began without any type of injury.  She had a similar thing about a year ago and had a steroid injection which completely resolved it.  States that is worse with getting up in the morning, worse with walking.  It extends to the knee but does not past the knee, does not go down into the foot.  Feels like the leg is heavy.  She denies any bowel or bladder incontinence.        Previous history:Patient reports having neck pain for over 20 years, usually worse with work and stress.  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  She reports that the pain is in the neck, left shoulder, midline scapular region and reports having numbness and tingling down the left arm.  She used to have this frequently but lately the arm symptoms have largely resolved.  She reports that she generally gets worsened pain with sitting and the pain is worse in the morning and she gets some relief with medications and heat.  She recently had x-rays of her cervical spine and thoracic spine which show diffuse idiopathic skeletal hyperostosis with significant anterior osteophytes.  She has been having difficulty with swallowing lately but also reports having some issues with snoring and she is currently being worked up by ENT.    Pain intervention history:No History of injections    Spine surgeries:      Antineuropathics:  Sometimes uses CBD or THC  NSAIDs:  Aspirin, Aleve  Physical therapy:  She  has not been doing any physical therapy but does yoga exercises for her neck  Antidepressants:  Muscle relaxers:  Baclofen 10  Opioids:  Antiplatelets/Anticoagulants:        ROS:  She reports fatigue, skin texture change, headaches, vision change, swollen glands, cough, diarrhea, easy bruising, hypothyroidism, blood in the urine, joint stiffness, back pain and anxiety.  Balance of review of systems is negative.    Lab Results   Component Value Date    HGBA1C 4.9 2023       Lab Results   Component Value Date    WBC 4.00 2023    HGB 15.6 2023    HCT 46.3 2023    MCV 99 (H) 2023     2023             Past Medical History:   Diagnosis Date    ADD (attention deficit disorder)     Depression     DISH (diffuse idiopathic skeletal hyperostosis)     High risk HPV infection     Hyperlipidemia     Inflammatory spondylopathy, unspecified spinal region 2023    Insomnia     Nephrolithiasis 2023    Records @ [facility]; CT abd/pelvis w/o contrast w/ 2 mm distal right ureteral stone w/o hydronephrosis; resolved w/o complication; cleared by Urology f/u outpatient    Personal history of colonic polyps 06/15/2023    Thyroid nodule        Past Surgical History:   Procedure Laterality Date    BREAST BIOPSY Left     benign    THYROIDECTOMY      TONSILLECTOMY      TOTAL REDUCTION MAMMOPLASTY Bilateral     breast lift       Social History     Socioeconomic History    Marital status:    Tobacco Use    Smoking status: Former     Current packs/day: 0.00     Types: Cigarettes     Quit date: 3/19/2016     Years since quittin.9    Smokeless tobacco: Never   Substance and Sexual Activity    Alcohol use: Yes     Alcohol/week: 7.0 standard drinks of alcohol     Types: 7 Glasses of wine per week    Drug use: No    Sexual activity: Yes     Partners: Male     Birth control/protection: Post-menopausal, None     Social Determinants of Health     Financial Resource Strain: Low Risk   "(12/7/2023)    Overall Financial Resource Strain (CARDIA)     Difficulty of Paying Living Expenses: Not very hard   Food Insecurity: No Food Insecurity (12/7/2023)    Hunger Vital Sign     Worried About Running Out of Food in the Last Year: Never true     Ran Out of Food in the Last Year: Never true   Transportation Needs: No Transportation Needs (12/7/2023)    PRAPARE - Transportation     Lack of Transportation (Medical): No     Lack of Transportation (Non-Medical): No   Physical Activity: Insufficiently Active (12/7/2023)    Exercise Vital Sign     Days of Exercise per Week: 1 day     Minutes of Exercise per Session: 30 min   Stress: Stress Concern Present (12/7/2023)    [employer] [employer] - Occupational Stress Questionnaire     Feeling of Stress : To some extent   Social Connections: Unknown (12/7/2023)    Social Connection and Isolation Panel [NHANES]     Frequency of Communication with Friends and Family: More than three times a week     Frequency of Social Gatherings with Friends and Family: Once a week     Active Member of Clubs or Organizations: No     Attends Club or Organization Meetings: Never     Marital Status:    Housing Stability: Low Risk  (12/7/2023)    Housing Stability Vital Sign     Unable to Pay for Housing in the Last Year: No     Number of Places Lived in the Last Year: 1     Unstable Housing in the Last Year: No         Medications/Allergies: See med card    Vitals:    03/04/24 0809   BP: (!) 105/58   Pulse: 85   Weight: 53.9 kg (118 lb 13.3 oz)   Height: 5' 3" (1.6 m)   PainSc:   4   PainLoc: Back   Body mass index is 21.05 kg/m².      3/4/2024     8:07 AM 3/21/2022     9:15 AM 8/6/2021     8:47 AM   Last 3 PDI Scores   Pain Disability Index (PDI) 25 21 12             Physical exam:    Gen: A and O x3, pleasant, well-groomed  Skin: No rashes or obvious lesions  HEENT: PERRLA, no obvious deformities on ears or in canals. Trachea midline.  CVS: Regular rate and " rhythm, normal palpable pulses.  Resp:No increased work of breathing, symmetrical chest rise.  Abdomen: Soft, NT/ND.  Musculoskeletal:  No antalgic gait.         Neuro:    Iliopsoas Quadriceps Knee  Flexion Tibialis  anterior Gastro- cnemius EHL   Lower: R 5/5 5/5 5/5 5/5 5/5 5/5    L 5/5 5/5 5/5 5/5 5/5 5/5      Left  Right    Patellar DTR 2+ 2+   Achilles DTR 2+ 2+   Burton Absent  Absent   Clonus Absent Absent   Babinski Absent Absent     Intact and symmetrical to light touch and pinprick in L1-S1 dermatomes bilaterally.    Lumbar spine:  Lumbar spine: ROM is full with flexion extension and oblique extension with increased pain on oblique extension in the right buttock    Tawanda's test causes no increased pain on either side.    Supine straight leg raise is negative bilaterally.    Internal and external rotation of the hip causes no increased pain on either side.  Myofascial exam: No tenderness to palpation across lumbar paraspinous muscles.          Imagin/3/19 Xray C-spine:  There is straightening of the cervical spine with loss of normal lordosis.  There was a similar appearance on comparison plain films.  The vertebral bodies maintain normal height and alignment.  There is no change with flexion or extension.  There is marked disc space narrowing at the C4-5 through C6-7 levels where there is endplate sclerosis.  Large bulky anterior osteophytes are present from the C3-4 through C6-7 levels.  These findings were present previously but have progressed.  The odontoid process is intact.  The facet joints maintain normal articulation.  There is multilevel bilateral foraminal stenosis due to changes of uncovertebral spurring and facet joint arthropathy.    10/15/20 Xray T-spine:  Slight scoliotic curvature lower thoracic spine.  No spondylolisthesis.  No displaced fracture with preserved vertebral body heights.  Intact pedicles.  Mild degenerative disc disease several mid thoracic levels with some non  bridging anterior and lateral osteophyte formation.  No erosions.  No mineralization along the posterior longitudinal ligament.    10/15/20 Xray SI joints:  The sacroiliac joints are well aligned and well maintained.  No significant degenerative changes are present.  No erosive changes are present.  Other bones of the pelvis appear normal.     10/15/20 Xray L-spine: Slight scoliotic curvature of the lumbar spine.  No spondylolisthesis.  No displaced fracture with preserved vertebral body heights.  Mild degenerative disc disease at L2-3 through L4-5.  Facet degenerative change lowest 2 lumbar levels.    Assessment:   Patient is a 58-year-old woman with a history of hypothyroidism, ADD with a recent diagnosis of DISH presents in referral from Dr. [facility] for neck pain.      1. DISH (diffuse idiopathic skeletal hyperostosis)        2. Lumbar spondylosis        3. DDD (degenerative disc disease), lumbar        4. Cervical spondylosis        5. Cervical radiculopathy              Plan:   1. We reviewed her symptoms, exam and lumbar spine x-ray which shows degenerative changes especially at L4/5 and L5/S1.  I suspect that she has either a lumbar spondylosis pain or foraminal narrowing causing her symptoms.  Since she had relief with the steroid injection in the past, I am going to get her set up with this today and have her follow up as needed.  If her pain persists, we would set her up with imaging, physical therapy.

## 2024-03-18 ENCOUNTER — OFFICE VISIT (OUTPATIENT)
Dept: OBSTETRICS AND GYNECOLOGY | Facility: CLINIC | Age: 59
End: 2024-03-18
Payer: COMMERCIAL

## 2024-03-18 VITALS
SYSTOLIC BLOOD PRESSURE: 110 MMHG | WEIGHT: 120.38 LBS | DIASTOLIC BLOOD PRESSURE: 70 MMHG | BODY MASS INDEX: 22.15 KG/M2 | HEIGHT: 62 IN

## 2024-03-18 DIAGNOSIS — Z01.419 ENCOUNTER FOR GYNECOLOGICAL EXAMINATION (GENERAL) (ROUTINE) WITHOUT ABNORMAL FINDINGS: Primary | ICD-10-CM

## 2024-03-18 DIAGNOSIS — Z12.4 CERVICAL CANCER SCREENING: ICD-10-CM

## 2024-03-18 PROCEDURE — 99999 PR PBB SHADOW E&M-EST. PATIENT-LVL III: CPT | Mod: PBBFAC,,, | Performed by: OBSTETRICS & GYNECOLOGY

## 2024-03-18 PROCEDURE — 87624 HPV HI-RISK TYP POOLED RSLT: CPT | Performed by: OBSTETRICS & GYNECOLOGY

## 2024-03-18 PROCEDURE — 1159F MED LIST DOCD IN RCRD: CPT | Mod: CPTII,S$GLB,, | Performed by: OBSTETRICS & GYNECOLOGY

## 2024-03-18 PROCEDURE — 3078F DIAST BP <80 MM HG: CPT | Mod: CPTII,S$GLB,, | Performed by: OBSTETRICS & GYNECOLOGY

## 2024-03-18 PROCEDURE — 99386 PREV VISIT NEW AGE 40-64: CPT | Mod: S$GLB,,, | Performed by: OBSTETRICS & GYNECOLOGY

## 2024-03-18 PROCEDURE — 88175 CYTOPATH C/V AUTO FLUID REDO: CPT | Performed by: OBSTETRICS & GYNECOLOGY

## 2024-03-18 PROCEDURE — 3074F SYST BP LT 130 MM HG: CPT | Mod: CPTII,S$GLB,, | Performed by: OBSTETRICS & GYNECOLOGY

## 2024-03-18 PROCEDURE — 3008F BODY MASS INDEX DOCD: CPT | Mod: CPTII,S$GLB,, | Performed by: OBSTETRICS & GYNECOLOGY

## 2024-03-18 NOTE — PROGRESS NOTES
History and Physical:  Glendy Andrade is a 58 y.o. F who presents today for her routine annual GYN exam. The patient has  no Gynecology complaints.    Annual:   No LMP recorded. Patient is postmenopausal.  Menopause: yes at 45, no bleeding since then   Last Pap: 5/2019 NILM/HPV neg; next 5/2024  History of abnormal pap: 2018 HPV positive non 16/18; denies Ex, but admits to Bx  Last Mammogram: 12/2023 BIRADS 1; Tyrer-Cuzick risk 5%; next 12/2024  Colonosocpy: 6/2023; next 6/2033  PCP: Jillian Rosen MD orders routine labs 12/2023  Vaccines: up to date per patient    Patient states she is on pallet HRT.     Allergies:   Review of patient's allergies indicates:  No Known Allergies  Past Medical History:   Diagnosis Date    ADD (attention deficit disorder)     Depression     DISH (diffuse idiopathic skeletal hyperostosis)     High risk HPV infection     Hyperlipidemia     Inflammatory spondylopathy, unspecified spinal region 03/08/2023    Insomnia     Nephrolithiasis 09/29/2023    Records @ [facility]; CT abd/pelvis w/o contrast w/ 2 mm distal right ureteral stone w/o hydronephrosis; resolved w/o complication; cleared by Urology f/u outpatient    Personal history of colonic polyps 06/15/2023    Thyroid nodule      Past Surgical History:   Procedure Laterality Date    BREAST BIOPSY Left 2011    benign    THYROIDECTOMY      TONSILLECTOMY      TOTAL REDUCTION MAMMOPLASTY Bilateral 2020    breast lift     MEDS:   Current Outpatient Medications on File Prior to Visit   Medication Sig Dispense Refill    baclofen (LIORESAL) 10 MG tablet Take 1 tablet (10 mg total) by mouth 2 (two) times daily as needed (muscle spasms). 90 tablet 1    ondansetron (ZOFRAN) 8 MG tablet TAKE 1 TABLET BY MOUTH EVERY 6 TO 8 HOURS AS NEEDED FOR NAUSEA AND VOMITING      VYVANSE 30 mg capsule Take 30 mg by mouth.      rosuvastatin (CRESTOR) 10 MG tablet Take 1 tablet (10 mg total) by mouth once daily. 90 tablet 3     No current  facility-administered medications on file prior to visit.     OB History          4    Para   4    Term   4            AB        Living             SAB        IAB        Ectopic        Multiple        Live Births                   Social History     Socioeconomic History    Marital status:    Tobacco Use    Smoking status: Former     Current packs/day: 0.00     Types: Cigarettes     Quit date: 3/19/2016     Years since quittin.0    Smokeless tobacco: Never   Substance and Sexual Activity    Alcohol use: Yes     Alcohol/week: 7.0 standard drinks of alcohol     Types: 7 Glasses of wine per week    Drug use: No    Sexual activity: Yes     Partners: Male     Birth control/protection: Post-menopausal, None     Social Determinants of Health     Financial Resource Strain: Low Risk  (2023)    Overall Financial Resource Strain (CARDIA)     Difficulty of Paying Living Expenses: Not very hard   Food Insecurity: No Food Insecurity (2023)    Hunger Vital Sign     Worried About Running Out of Food in the Last Year: Never true     Ran Out of Food in the Last Year: Never true   Transportation Needs: No Transportation Needs (2023)    PRAPARE - Transportation     Lack of Transportation (Medical): No     Lack of Transportation (Non-Medical): No   Physical Activity: Insufficiently Active (2023)    Exercise Vital Sign     Days of Exercise per Week: 1 day     Minutes of Exercise per Session: 30 min   Stress: Stress Concern Present (2023)    [employer] [place] [employer] - Occupational Stress Questionnaire     Feeling of Stress : To some extent   Social Connections: Unknown (2023)    Social Connection and Isolation Panel [NHANES]     Frequency of Communication with Friends and Family: More than three times a week     Frequency of Social Gatherings with Friends and Family: Once a week     Active Member of Clubs or Organizations: No     Attends Club or Organization Meetings:  "Never     Marital Status:    Housing Stability: Low Risk  (12/7/2023)    Housing Stability Vital Sign     Unable to Pay for Housing in the Last Year: No     Number of Places Lived in the Last Year: 1     Unstable Housing in the Last Year: No     Family History   Problem Relation Age of Onset    Diabetes Paternal Grandmother     Arthritis Maternal Grandmother     Diabetes Maternal Grandmother     Hypertension Father     Breast cancer Neg Hx     Ovarian cancer Neg Hx        Past medical and surgical history reviewed.   I have reviewed the patient's medical history in detail and updated the computerized patient record.    Review of System:   General: no chills, fever, night sweats, weight gain or weight loss  Breasts: no new or changing breast lumps, nipple discharge or masses.  Gastrointestinal: no abdominal pain, change in bowel habits, or black or bloody stools  Genito-Urinary: no incontinence, urinary frequency/urgency or vulvar/vaginal symptoms, pelvic pain or abnormal vaginal bleeding.    Physical Exam:   /70   Ht 5' 2" (1.575 m)   Wt 54.6 kg (120 lb 5.9 oz)   BMI 22.02 kg/m²   Body mass index is 22.02 kg/m².  Constitutional: She appears alert and responsive. She appears well-developed, well-groomed, and well-nourished. No distress.  Breasts: are symmetrical.  Right breast exhibits no inverted nipple, no mass, no nipple discharge, no skin change and no tenderness.  Left breast exhibits no inverted nipple, no mass, no nipple discharge, no skin change and no tenderness.  Abdominal: Soft. She exhibits no distension, hernias or masses. There is no tenderness. No enlargement of liver edge or spleen.  There is no rebound and no guarding.   Genitourinary:    External rectal exam shows no thrombosed external hemorrhoids, no lesions.     Pelvic exam was performed with patient supine.   No labial fusion, and symmetrical.    There is no rash, lesion or injury on the right labia.   There is no rash, lesion or " injury on the left labia.   No bleeding and no signs of injury around the vaginal introitus, urethral meatus is normal size and without prolapse or lesions, urethra well supported. The cervix is visualized with no discharge, lesions or friability.   No vaginal discharge found.    No significant Cystocele, Enterocele or rectocele, and cervix and uterus well supported.   Bimanual exam:   The urethra is normal to palpation and there are no palpable vaginal wall masses.   Uterus is not deviated, not enlarged, not fixed, normal shape and not tender.   Cervix exhibits no motion tenderness.    Right adnexum displays no mass or nodularity and no tenderness.   Left adnexum displays no mass or nodularity and no tenderness.    Assessment/Plan:   Encounter for gynecological examination (general) (routine) without abnormal findings  -     Liquid-Based Pap Smear, Screening  -     HPV High Risk Genotypes, PCR    Cervical cancer screening  -     Liquid-Based Pap Smear, Screening  -     HPV High Risk Genotypes, PCR      Advised I do not recommend HRT with pellets. I would be happy to discuss alternative HRT if she would like.     Follow up in 1 year.

## 2024-03-22 RX ORDER — ROSUVASTATIN CALCIUM 10 MG/1
10 TABLET, COATED ORAL
Qty: 30 TABLET | Refills: 11 | Status: SHIPPED | OUTPATIENT
Start: 2024-03-22 | End: 2024-04-03

## 2024-03-26 ENCOUNTER — OFFICE VISIT (OUTPATIENT)
Dept: FAMILY MEDICINE | Facility: CLINIC | Age: 59
End: 2024-03-26
Payer: COMMERCIAL

## 2024-03-26 ENCOUNTER — HOSPITAL ENCOUNTER (OUTPATIENT)
Dept: RADIOLOGY | Facility: HOSPITAL | Age: 59
Discharge: HOME | End: 2024-03-26
Attending: STUDENT IN AN ORGANIZED HEALTH CARE EDUCATION/TRAINING PROGRAM
Payer: COMMERCIAL

## 2024-03-26 VITALS
OXYGEN SATURATION: 99 % | WEIGHT: 120.69 LBS | BODY MASS INDEX: 22.21 KG/M2 | DIASTOLIC BLOOD PRESSURE: 72 MMHG | HEIGHT: 62 IN | SYSTOLIC BLOOD PRESSURE: 112 MMHG | HEART RATE: 73 BPM

## 2024-03-26 DIAGNOSIS — N30.01 ACUTE CYSTITIS WITH HEMATURIA: Primary | ICD-10-CM

## 2024-03-26 DIAGNOSIS — N30.01 ACUTE CYSTITIS WITH HEMATURIA: ICD-10-CM

## 2024-03-26 DIAGNOSIS — R10.30 LOWER ABDOMINAL PAIN: ICD-10-CM

## 2024-03-26 DIAGNOSIS — M25.50 POLYARTHRALGIA: ICD-10-CM

## 2024-03-26 DIAGNOSIS — R31.0 GROSS HEMATURIA: ICD-10-CM

## 2024-03-26 DIAGNOSIS — M46.90 INFLAMMATORY SPONDYLOPATHY, UNSPECIFIED SPINAL REGION: ICD-10-CM

## 2024-03-26 PROBLEM — E89.0 H/O PARTIAL THYROIDECTOMY: Chronic | Status: ACTIVE | Noted: 2023-12-07

## 2024-03-26 PROBLEM — F33.42 MAJOR DEPRESSIVE DISORDER, RECURRENT, IN FULL REMISSION: Chronic | Status: RESOLVED | Noted: 2023-03-08 | Resolved: 2024-03-26

## 2024-03-26 LAB
BILIRUBIN, UA POC OHS: NEGATIVE
BLOOD, UA POC OHS: ABNORMAL
CLARITY, UA POC OHS: CLEAR
COLOR, UA POC OHS: YELLOW
GLUCOSE, UA POC OHS: NEGATIVE
KETONES, UA POC OHS: NEGATIVE
LEUKOCYTES, UA POC OHS: NEGATIVE
NITRITE, UA POC OHS: NEGATIVE
PH, UA POC OHS: 8
PROTEIN, UA POC OHS: NEGATIVE
SPECIFIC GRAVITY, UA POC OHS: 1.02
UROBILINOGEN, UA POC OHS: 0.2

## 2024-03-26 PROCEDURE — 3074F SYST BP LT 130 MM HG: CPT | Mod: CPTII,S$GLB,, | Performed by: STUDENT IN AN ORGANIZED HEALTH CARE EDUCATION/TRAINING PROGRAM

## 2024-03-26 PROCEDURE — 99999 PR PBB SHADOW E&M-EST. PATIENT-LVL III: CPT | Mod: PBBFAC,,, | Performed by: STUDENT IN AN ORGANIZED HEALTH CARE EDUCATION/TRAINING PROGRAM

## 2024-03-26 PROCEDURE — 1159F MED LIST DOCD IN RCRD: CPT | Mod: CPTII,S$GLB,, | Performed by: STUDENT IN AN ORGANIZED HEALTH CARE EDUCATION/TRAINING PROGRAM

## 2024-03-26 PROCEDURE — 3008F BODY MASS INDEX DOCD: CPT | Mod: CPTII,S$GLB,, | Performed by: STUDENT IN AN ORGANIZED HEALTH CARE EDUCATION/TRAINING PROGRAM

## 2024-03-26 PROCEDURE — 99214 OFFICE O/P EST MOD 30 MIN: CPT | Mod: S$GLB,,, | Performed by: STUDENT IN AN ORGANIZED HEALTH CARE EDUCATION/TRAINING PROGRAM

## 2024-03-26 PROCEDURE — 3078F DIAST BP <80 MM HG: CPT | Mod: CPTII,S$GLB,, | Performed by: STUDENT IN AN ORGANIZED HEALTH CARE EDUCATION/TRAINING PROGRAM

## 2024-03-26 PROCEDURE — 74176 CT ABD & PELVIS W/O CONTRAST: CPT | Mod: 26,,, | Performed by: RADIOLOGY

## 2024-03-26 PROCEDURE — 81003 URINALYSIS AUTO W/O SCOPE: CPT | Mod: QW,S$GLB,, | Performed by: STUDENT IN AN ORGANIZED HEALTH CARE EDUCATION/TRAINING PROGRAM

## 2024-03-26 PROCEDURE — 87086 URINE CULTURE/COLONY COUNT: CPT | Performed by: STUDENT IN AN ORGANIZED HEALTH CARE EDUCATION/TRAINING PROGRAM

## 2024-03-26 PROCEDURE — 74176 CT ABD & PELVIS W/O CONTRAST: CPT | Mod: TC,PO

## 2024-03-26 RX ORDER — PROGESTERONE 200 MG/1
200 CAPSULE ORAL DAILY
COMMUNITY

## 2024-03-26 RX ORDER — SULFAMETHOXAZOLE AND TRIMETHOPRIM 800; 160 MG/1; MG/1
1 TABLET ORAL 2 TIMES DAILY
Qty: 6 TABLET | Refills: 0 | Status: SHIPPED | OUTPATIENT
Start: 2024-03-26 | End: 2024-03-29

## 2024-03-26 NOTE — PROGRESS NOTES
Plan:      Glendy was seen today for hematuria.    Diagnoses and all orders for this visit:    Acute cystitis with hematuria  -     CT Renal Stone Study ABD Pelvis WO; Future  -     sulfamethoxazole-trimethoprim 800-160mg (BACTRIM DS) 800-160 mg Tab; Take 1 tablet by mouth 2 (two) times daily. for 3 days    Gross hematuria  -     Urine culture  -     POCT Urinalysis(Instrument)  -     CBC Auto Differential; Future  -     Comprehensive Metabolic Panel; Future  -     CT Renal Stone Study ABD Pelvis WO; Future    Lower abdominal pain  -     CT Renal Stone Study ABD Pelvis WO; Future    Inflammatory spondylopathy, unspecified spinal region  -     Ambulatory referral/consult to Rheumatology; Future    Polyarthralgia  -     C-Reactive Protein; Future  -     Sedimentation rate; Future  -     Rheumatoid Factor; Future  -     Cyclic Citrullinated Peptide Antibody, IgG; Future  -     ARTIE Screen w/Reflex; Future  -     Anti-Cinthya 1 Antibody, IgG; Future  -     Anti Sm/RNP Antibody; Future  -     Ribosomal P Antibody, NYDIA; Future  -     Sjogrens syndrome-A extractable nuclear antibody; Future  -     Sjogrens syndrome-B extractable nuclear antibody; Future  -     Anti-Histone Antibody; Future  -     Cardiolipin antibody; Future  -     Anti-DNA Ab, Double-Stranded; Future  -     Anti-Smith Antibody; Future  -     Ambulatory referral/consult to Rheumatology; Future      Follow up in about 4 weeks (around 4/23/2024), or if symptoms worsen or fail to improve.    Jillian Rosen MD  03/26/2024    Subjective:      Patient ID: Glendy Andrade is a 58 y.o. female    Chief Complaint   Patient presents with    Hematuria     HPI  58 y.o. female with a PMHx as documented below presents to clinic today for the following:    Pt reports 3 day history of hematuria: pink tinge in toilet bowl, streaks of blood on toilet paper, small clots, and dark color urine. No associated dysuria, urinary frequency. Associated symptoms include cramping lower  "back/pelvic pain. Hx of nephrolithiasis in Fall 2023.    Additionally, pt was recommended to follow-up with Rheumatology for further workup of polyarthralgia with concern for RA.    Review of Systems   Constitutional:  Negative for chills, fever and malaise/fatigue.   Respiratory:  Negative for shortness of breath.    Cardiovascular:  Negative for chest pain.   Gastrointestinal:  Positive for abdominal pain. Negative for blood in stool, constipation, diarrhea, melena, nausea and vomiting.   Genitourinary:  Positive for hematuria. Negative for dysuria, frequency and urgency.   Musculoskeletal:  Positive for back pain.     Current Outpatient Medications   Medication Instructions    baclofen (LIORESAL) 10 mg, Oral, 2 times daily PRN    ondansetron (ZOFRAN) 8 MG tablet TAKE 1 TABLET BY MOUTH EVERY 6 TO 8 HOURS AS NEEDED FOR NAUSEA AND VOMITING    progesterone (PROMETRIUM) 200 mg, Oral, Daily, Compound     rosuvastatin (CRESTOR) 10 mg, Oral    sulfamethoxazole-trimethoprim 800-160mg (BACTRIM DS) 800-160 mg Tab 1 tablet, Oral, 2 times daily    VYVANSE 30 mg, Oral      Past Medical History:   Diagnosis Date    ADD (attention deficit disorder)     Depression     DISH (diffuse idiopathic skeletal hyperostosis)     High risk HPV infection     Hyperlipidemia     Inflammatory spondylopathy, unspecified spinal region 03/08/2023    Insomnia     Nephrolithiasis 09/29/2023    Records @ [facility]; CT abd/pelvis w/o contrast w/ 2 mm distal right ureteral stone w/o hydronephrosis; resolved w/o complication; cleared by Urology f/u outpatient    Personal history of colonic polyps 06/15/2023    Thyroid nodule       Objective:      Vitals:    03/26/24 0757   BP: 112/72   BP Location: Left arm   Pulse: 73   SpO2: 99%   Weight: 54.8 kg (120 lb 11.2 oz)   Height: 5' 2" (1.575 m)     Body mass index is 22.08 kg/m².    Physical Exam   Constitutional:       General: No acute distress.  HENT:      Head: Normocephalic and atraumatic.   Pulmonary: "      Effort: Pulmonary effort is normal. No respiratory distress.   Neurological:      General: No focal deficit present.      Mental Status: Alert and oriented to person, place, and time. Mental status is at baseline.    Assessment:       1. Acute cystitis with hematuria    2. Gross hematuria    3. Lower abdominal pain    4. Inflammatory spondylopathy, unspecified spinal region    5. Polyarthralgia        Jillian Rosen MD  [facility]  Office: [phone]   Fax: [phone]  03/26/2024      Disclaimer: This note was partly generated using dictation software which may occasionally result in transcription errors.    Total time spent on this encounter includes face to face time and non-face to face time preparing to see the patient (eg, review of tests), obtaining and/or reviewing separately obtained history, documenting clinical information in the electronic or other health record, independently interpreting results, and communicating results to the patient/family/caregiver, or care coordinator.

## 2024-03-27 ENCOUNTER — TELEPHONE (OUTPATIENT)
Dept: FAMILY MEDICINE | Facility: CLINIC | Age: 59
End: 2024-03-27
Payer: COMMERCIAL

## 2024-03-27 LAB
BACTERIA UR CULT: NORMAL
BACTERIA UR CULT: NORMAL

## 2024-04-01 ENCOUNTER — TELEPHONE (OUTPATIENT)
Dept: FAMILY MEDICINE | Facility: CLINIC | Age: 59
End: 2024-04-01
Payer: COMMERCIAL

## 2024-04-01 NOTE — TELEPHONE ENCOUNTER
----- Message from Jillian Rosen MD sent at 3/29/2024  5:17 PM CDT -----  Please call the patient and have them schedule an appointment at their earliest convenience to discuss their lab results (virtual okay). Thanks!

## 2024-04-03 ENCOUNTER — LAB VISIT (OUTPATIENT)
Dept: LAB | Facility: HOSPITAL | Age: 59
End: 2024-04-03
Attending: INTERNAL MEDICINE
Payer: COMMERCIAL

## 2024-04-03 ENCOUNTER — OFFICE VISIT (OUTPATIENT)
Dept: RHEUMATOLOGY | Facility: CLINIC | Age: 59
End: 2024-04-03
Payer: COMMERCIAL

## 2024-04-03 VITALS
BODY MASS INDEX: 22.76 KG/M2 | HEART RATE: 78 BPM | WEIGHT: 123.69 LBS | DIASTOLIC BLOOD PRESSURE: 58 MMHG | HEIGHT: 62 IN | SYSTOLIC BLOOD PRESSURE: 112 MMHG

## 2024-04-03 DIAGNOSIS — M25.50 POLYARTHRALGIA: ICD-10-CM

## 2024-04-03 DIAGNOSIS — R21 RASH: ICD-10-CM

## 2024-04-03 DIAGNOSIS — M54.9 DORSALGIA, UNSPECIFIED: Primary | ICD-10-CM

## 2024-04-03 DIAGNOSIS — M54.6 PAIN IN THORACIC SPINE: ICD-10-CM

## 2024-04-03 DIAGNOSIS — R76.8 ANA POSITIVE: ICD-10-CM

## 2024-04-03 DIAGNOSIS — M54.2 CERVICALGIA: ICD-10-CM

## 2024-04-03 DIAGNOSIS — M46.90 INFLAMMATORY SPONDYLOPATHY, UNSPECIFIED SPINAL REGION: ICD-10-CM

## 2024-04-03 LAB
BACTERIA #/AREA URNS AUTO: NORMAL /HPF
BILIRUB UR QL STRIP: NEGATIVE
CLARITY UR REFRACT.AUTO: CLEAR
COLOR UR AUTO: YELLOW
CREAT UR-MCNC: 72 MG/DL (ref 15–325)
GLUCOSE UR QL STRIP: NEGATIVE
HGB UR QL STRIP: ABNORMAL
KETONES UR QL STRIP: NEGATIVE
LEUKOCYTE ESTERASE UR QL STRIP: ABNORMAL
MICROSCOPIC COMMENT: NORMAL
NITRITE UR QL STRIP: NEGATIVE
PH UR STRIP: 7 [PH] (ref 5–8)
PROT UR QL STRIP: NEGATIVE
PROT UR-MCNC: <7 MG/DL (ref 0–15)
PROT/CREAT UR: NORMAL MG/G{CREAT} (ref 0–0.2)
RBC #/AREA URNS AUTO: 3 /HPF (ref 0–4)
SP GR UR STRIP: 1.01 (ref 1–1.03)
SQUAMOUS #/AREA URNS AUTO: 9 /HPF
URN SPEC COLLECT METH UR: ABNORMAL
WBC #/AREA URNS AUTO: 2 /HPF (ref 0–5)

## 2024-04-03 PROCEDURE — 84156 ASSAY OF PROTEIN URINE: CPT | Performed by: INTERNAL MEDICINE

## 2024-04-03 PROCEDURE — 3008F BODY MASS INDEX DOCD: CPT | Mod: CPTII,S$GLB,, | Performed by: INTERNAL MEDICINE

## 2024-04-03 PROCEDURE — 99205 OFFICE O/P NEW HI 60 MIN: CPT | Mod: S$GLB,,, | Performed by: INTERNAL MEDICINE

## 2024-04-03 PROCEDURE — 3074F SYST BP LT 130 MM HG: CPT | Mod: CPTII,S$GLB,, | Performed by: INTERNAL MEDICINE

## 2024-04-03 PROCEDURE — 1159F MED LIST DOCD IN RCRD: CPT | Mod: CPTII,S$GLB,, | Performed by: INTERNAL MEDICINE

## 2024-04-03 PROCEDURE — 99999 PR PBB SHADOW E&M-EST. PATIENT-LVL V: CPT | Mod: PBBFAC,,, | Performed by: INTERNAL MEDICINE

## 2024-04-03 PROCEDURE — 1160F RVW MEDS BY RX/DR IN RCRD: CPT | Mod: CPTII,S$GLB,, | Performed by: INTERNAL MEDICINE

## 2024-04-03 PROCEDURE — 81001 URINALYSIS AUTO W/SCOPE: CPT | Performed by: INTERNAL MEDICINE

## 2024-04-03 PROCEDURE — 3078F DIAST BP <80 MM HG: CPT | Mod: CPTII,S$GLB,, | Performed by: INTERNAL MEDICINE

## 2024-04-03 NOTE — PROGRESS NOTES
Chief Complaint   Patient presents with    Disease Management       Patient was referred by pain management    History of presenting illness    58 year old female comes asking if she has psoriatic arthritis    She never had a diagnosis of psoriasis    We dont know why the concern was raised    The concern was raised by pain management    Past history : DISH  Thyroid nodule removed  HLD  Insomnia     Family history : sister had surgery on her neck-? Disc issues   Mother has issues with neck,back    Social history :  Former smoker,quit many years ago  Alcohol use not heavy        Review of Systems   Constitutional:  Negative for fever and unexpected weight change.   HENT:  Negative for mouth sores and trouble swallowing.    Eyes:  Negative for redness.   Respiratory:  Negative for cough and shortness of breath.    Cardiovascular:  Negative for chest pain.   Gastrointestinal:  Negative for constipation and diarrhea.   Genitourinary:  Positive for dysuria. Negative for genital sores.   Skin:  Negative for rash.   Neurological:  Positive for headaches.   Hematological:  Does not bruise/bleed easily.       No malar rash,photosensitivity   No telangiectasias   No calcinosis   No patchy alopecia   Has thinning of hair  No oral and nasal ulcers   No pleurisy or any cardiopulmonary complaints   No dysphagia,diplopia and dysphonia and muscle weakness   No n/v/d/c   No acid reflux+   No cytopenias   No renal issues   No blood clots   No fever,chills,night sweats,weight loss and loss of appetite   No pregnancy losses/pre term deliveries /pregnancy complications   No recurrent conjunctivitis or uveitis or scleritis or episcleritis   No chronic or bloody diarrhea with no u colitis or crohn's /inflammatory bowel disease   No vaginal or  urethral  d/c/STDs/no ulcers   No unexplained lymphadenopathy,parotitis   No seizures,strokes,psychosis  No sclerodactyly  No puffy hands  No perioral tightness             Exam not very  remarkable    Some limitation with flexion at the cervical spine  Normal extension at the cervical spine  Mild rotatory movements limited at the cervical spine    Normal lumbar spine forward flexion with minimal pain shooting down the left leg  SLRT negative  Normal lumbar spine extension  Normal lumbar spine rotation    Normal SI joint provocative manuevers with no pain    All peripheral joints no synovitis,dactylitis and enthesitis    Physical Exam   Constitutional: She is oriented to person, place, and time. No distress.   HENT:   Head: Normocephalic.   Mouth/Throat: Oropharynx is clear and moist.   Eyes: Pupils are equal, round, and reactive to light. Conjunctivae are normal. Right eye exhibits no discharge. Left eye exhibits no discharge. No scleral icterus.   Neck: No thyromegaly present.   Cardiovascular: Normal rate, regular rhythm and normal heart sounds.   Pulmonary/Chest: Effort normal and breath sounds normal. No stridor.   Abdominal: Soft. Bowel sounds are normal.   Musculoskeletal:         General: Normal range of motion.      Cervical back: Normal range of motion.   Lymphadenopathy:     She has no cervical adenopathy.   Neurological: She is alert and oriented to person, place, and time.   Skin: Skin is warm. No rash noted. She is not diaphoretic.   Psychiatric: Affect and judgment normal.         Assessment       58 year old white female comes in with    Back pain for 26 years    She would go to chiropractor   She needed adjustment since she had spurs   Tight back pain that would burn  But after some time she would manage it with massages and cream  Until it got worse in 2020    Then got diagnosed with    1) DISH - neck and mid back and low back  Diagnosed in 2020  Severe neck and shoulder pain  Physical therapy and pain management offered    Takes advil vs tylenol    MRI C spine 2022    C2-C3: No disc herniation or significant posterior osseous ridging. No significant spinal canal or foraminal  stenosis.     C3-C4: Mild disc osteophyte complex with prominent central component.  Slight ventral cord flattening with thin sliver preserved ventral and preserved dorsal CSF surrounding the cord.  Minimal left foraminal stenosis.     C4-C5: Trace retrolisthesis.  Moderate disc osteophyte complex, eccentric to the right.  Ligamentum flavum thickening.  Mild right ventral cord flattening with thin sliver preserved ventral and dorsal CSF surrounding the cord.  Moderate-severe bilateral foraminal stenosis.     C5-C6: Moderate disc osteophyte complex.  Ligamentum flavum thickening.  Minimal left facet hypertrophy.  Mild ventral cord flattening with thin sliver preserved ventral and preserved dorsal CSF surrounding the cord.  Moderate-severe right and mild-moderate left foraminal stenosis.     C6-C7: Trace retrolisthesis.  Moderate disc osteophyte complex.  Ligamentum flavum thickening.  Mild right facet hypertrophy.  Mild ventral cord flattening with thin sliver preserved ventral and dorsal CSF surrounding the cord.  Moderate-severe right and moderate left foraminal stenosis.     C7-T1: Mild-moderate disc osteophyte complex.  Mild left facet hypertrophy.  Ligamentum flavum thickening.  Slight cord flattening with preserved ventral and dorsal CSF.  Mild left foraminal stenosis.     Impression:     1. Multilevel spondylosis, greatest at C4-5 through C6-7 where there is mild cord flattening with preserved CSF surrounding the cord and no cord signal abnormality.  2. Multilevel foraminal stenosis, greatest bilaterally at C4-5 and on the right at C5-6 and C6-7 where it is moderate-severe.  3. Bulky anterior osteophyte formation from C3-4 to C6-7.    C spine xray  There is straightening of the cervical spine with loss of normal lordosis.  There was a similar appearance on comparison plain films.  The vertebral bodies maintain normal height and alignment.  There is no change with flexion or extension.  There is marked disc  space narrowing at the C4-5 through C6-7 levels where there is endplate sclerosis.  Large bulky anterior osteophytes are present from the C3-4 through C6-7 levels.  These findings were present previously but have progressed.  The odontoid process is intact.  The facet joints maintain normal articulation.  There is multilevel bilateral foraminal stenosis due to changes of uncovertebral spurring and facet joint arthropathy.     C spine MRI 2020              CT abdomen 2024  Degenerative changes with protrusion at the L3-L4 and L4-L5 levels     SI joints nml    T spine xray  Slight scoliotic curvature lower thoracic spine.  No spondylolisthesis.  No displaced fracture with preserved vertebral body heights.  Intact pedicles.  Mild degenerative disc disease several mid thoracic levels with some non bridging anterior and lateral osteophyte formation.  No erosions.  No mineralization along the posterior longitudinal ligament.     LS spine xray  Slight scoliotic curvature of the lumbar spine.  No spondylolisthesis.  No displaced fracture with preserved vertebral body heights.  Mild degenerative disc disease at L2-3 through L4-5.  Facet degenerative change lowest 2 lumbar levels.       [id number] neg    Heating pad,magnesium powder in water, advil and muscle relaxant helps    2) Chronic pain in the entire back  Morning stiffness for 3 hours  Prolonged rest makes her stiff  Picking up things and moving - pain doesn't get worse  She wakes up at 2 am with shoulder and neck and low back pain sometimes    3) Thumb pain- CMC,MCP pain for 6 months   No swollen joints  Nothing else hurts in the hands    Exam not very remarkable    Some limitation with flexion at the cervical spine  Normal extension at the cervical spine  Mild rotatory movements limited at the cervical spine    Normal lumbar spine forward flexion with minimal pain shooting down the left leg  SLRT negative  Normal lumbar spine extension  Normal lumbar spine  rotation    Normal SI joint provocative manuevers with no pain    All peripheral joints no synovitis,dactylitis and enthesitis      3)Skin lesions for 20 years  They are hard scaly,silvery patches on the ankles- diagnosis not clear  Red lesions on fingers- start as bumps, on the PIPs-not itchy,not painful        RF,CCP,ESR,CRP nml  ESR,CRP in the past nml    4)   She does not have symptoms of systemic lupus    ARTIE 1: 80,homogenous  SSA,SSB neg  Eastman neg  Eastman RNP neg  Cinthya-1 neg  Dsdna neg  Histone neg  Ribosomal p neg  Cardiolipin ab neg    CBC,CMP nml  Remotely white count was 3.88  H/h nml    Has hair thinning  Dry mouth and dry eyes but she takes vyvanse  She has dry skin  Raynaud's,with no digital ulcers  Has headaches but originates in the neck    For now we dont need further testing  But will start with dermatology     5) post menopausal bleeding      1. Dorsalgia, unspecified    2. Inflammatory spondylopathy, unspecified spinal region    3. Polyarthralgia    4. Pain in thoracic spine    5. Cervicalgia    6. ARTIE positive    7. Rash        Reviewed labs/xrays  Reviewed medications    Ordered labs /xrays    I have independently reviewed her imaging studies but I need expert [facility] radiology to help me diagnose whether this is AS vs DISH vs OA      New problem     Plan    MRI SI joints    MRI LS spine    MRI T spine    MRI C spine    Submit them for discussion at radio [facility] conference   Main question - syndesmophytes vs osteophytes     Dermatology - are the skin lesions lupus or psoriasis ?    Complete the lupus w.u  EVLEINE, Urine analysis, UPCR, Cardiolipin ab, beta 2 glycoprotein ab, LA    Gross hematuria : will see urology and gynecology   H/o kidney stones  Getting urine culture    Virtually in 4 weeks we will discuss results    More than 60 minutes spent taking care of the patient     Glendy was seen today for disease management.    Diagnoses and all orders for this visit:    Dorsalgia, unspecified  -     MRI  Sacroiliac Joint W W/O Contrast; Future  -     MRI Lumbar Spine Without Contrast; Future  -     MRI Thoracic Spine Without Contrast; Future  -     MRI Cervical Spine Without Contrast; Future    Inflammatory spondylopathy, unspecified spinal region  -     Ambulatory referral/consult to Rheumatology  -     MRI Sacroiliac Joint W W/O Contrast; Future  -     MRI Lumbar Spine Without Contrast; Future  -     MRI Thoracic Spine Without Contrast; Future  -     MRI Cervical Spine Without Contrast; Future    Polyarthralgia  -     Ambulatory referral/consult to Rheumatology    Pain in thoracic spine  -     MRI Sacroiliac Joint W W/O Contrast; Future  -     MRI Lumbar Spine Without Contrast; Future  -     MRI Thoracic Spine Without Contrast; Future  -     MRI Cervical Spine Without Contrast; Future    Cervicalgia  -     MRI Sacroiliac Joint W W/O Contrast; Future  -     MRI Lumbar Spine Without Contrast; Future  -     MRI Thoracic Spine Without Contrast; Future  -     MRI Cervical Spine Without Contrast; Future    ARTIE positive  -     C3 Complement; Future  -     C4 Complement; Future  -     Direct antiglobulin test; Future  -     Cardiolipin antibody; Future  -     Beta-2 Glycoprotein Abs (IgA, IgG, IgM); Future  -     DRVVT; Future  -     Urinalysis; Future  -     Protein/Creatinine Ratio, Urine; Future  -     CULTURE, URINE    Rash  -     Ambulatory referral/consult to Dermatology; Future        Medication changes made  Refilled medications  Toxicity issues discussed    Old records reviewed and summarised  Records are from [facility]      Follow up in 4 weeks    Notes will be sent to PCP    Preventive medicine

## 2024-04-03 NOTE — PROGRESS NOTES
4/3/2024    11:48 AM   Rapid3 Question Responses and Scores   MDHAQ Score 0   Psychologic Score 0   Pain Score 4   When you awakened in the morning OVER THE LAST WEEK, did you feel stiff? Yes   If Yes, please indicate the number of hours until you are as limber as you will be for the day 3   Fatigue Score 1   Global Health Score 2.5   RAPID3 Score 2.17     Answers submitted by the patient for this visit:  Rheumatology Questionnaire (Submitted on 4/3/2024)  fever: No  eye redness: No  mouth sores: No  headaches: Yes  shortness of breath: No  chest pain: No  trouble swallowing: No  diarrhea: No  constipation: No  unexpected weight change: No  genital sore: No  dysuria: Yes  During the last 3 days, have you had a skin rash?: No  Bruises or bleeds easily: No  cough: No

## 2024-04-05 ENCOUNTER — OFFICE VISIT (OUTPATIENT)
Dept: FAMILY MEDICINE | Facility: CLINIC | Age: 59
End: 2024-04-05
Payer: COMMERCIAL

## 2024-04-05 DIAGNOSIS — R31.0 GROSS HEMATURIA: Primary | ICD-10-CM

## 2024-04-05 DIAGNOSIS — R10.30 LOWER ABDOMINAL PAIN: ICD-10-CM

## 2024-04-05 PROCEDURE — 99214 OFFICE O/P EST MOD 30 MIN: CPT | Mod: 95,,, | Performed by: STUDENT IN AN ORGANIZED HEALTH CARE EDUCATION/TRAINING PROGRAM

## 2024-04-09 ENCOUNTER — HOSPITAL ENCOUNTER (OUTPATIENT)
Dept: RADIOLOGY | Facility: HOSPITAL | Age: 59
Discharge: HOME | End: 2024-04-09
Attending: STUDENT IN AN ORGANIZED HEALTH CARE EDUCATION/TRAINING PROGRAM
Payer: COMMERCIAL

## 2024-04-09 DIAGNOSIS — R10.30 LOWER ABDOMINAL PAIN: ICD-10-CM

## 2024-04-09 DIAGNOSIS — R31.0 GROSS HEMATURIA: ICD-10-CM

## 2024-04-09 PROCEDURE — 76856 US EXAM PELVIC COMPLETE: CPT | Mod: TC

## 2024-04-09 PROCEDURE — 76856 US EXAM PELVIC COMPLETE: CPT | Mod: 26,,, | Performed by: RADIOLOGY

## 2024-04-12 ENCOUNTER — OFFICE VISIT (OUTPATIENT)
Dept: OBSTETRICS AND GYNECOLOGY | Facility: CLINIC | Age: 59
End: 2024-04-12
Payer: COMMERCIAL

## 2024-04-12 VITALS
WEIGHT: 121.94 LBS | HEIGHT: 63 IN | DIASTOLIC BLOOD PRESSURE: 60 MMHG | SYSTOLIC BLOOD PRESSURE: 100 MMHG | BODY MASS INDEX: 21.61 KG/M2

## 2024-04-12 DIAGNOSIS — N95.0 PMB (POSTMENOPAUSAL BLEEDING): Primary | ICD-10-CM

## 2024-04-12 PROCEDURE — 3078F DIAST BP <80 MM HG: CPT | Mod: CPTII,S$GLB,, | Performed by: OBSTETRICS & GYNECOLOGY

## 2024-04-12 PROCEDURE — 99999 PR PBB SHADOW E&M-EST. PATIENT-LVL III: CPT | Mod: PBBFAC,,, | Performed by: OBSTETRICS & GYNECOLOGY

## 2024-04-12 PROCEDURE — 3008F BODY MASS INDEX DOCD: CPT | Mod: CPTII,S$GLB,, | Performed by: OBSTETRICS & GYNECOLOGY

## 2024-04-12 PROCEDURE — 88305 TISSUE EXAM BY PATHOLOGIST: CPT | Mod: 26,,, | Performed by: PATHOLOGY

## 2024-04-12 PROCEDURE — 1159F MED LIST DOCD IN RCRD: CPT | Mod: CPTII,S$GLB,, | Performed by: OBSTETRICS & GYNECOLOGY

## 2024-04-12 PROCEDURE — 99213 OFFICE O/P EST LOW 20 MIN: CPT | Mod: 25,S$GLB,, | Performed by: OBSTETRICS & GYNECOLOGY

## 2024-04-12 PROCEDURE — 3074F SYST BP LT 130 MM HG: CPT | Mod: CPTII,S$GLB,, | Performed by: OBSTETRICS & GYNECOLOGY

## 2024-04-12 PROCEDURE — 58100 BIOPSY OF UTERUS LINING: CPT | Mod: S$GLB,,, | Performed by: OBSTETRICS & GYNECOLOGY

## 2024-04-12 PROCEDURE — 88305 TISSUE EXAM BY PATHOLOGIST: CPT | Performed by: PATHOLOGY

## 2024-04-12 NOTE — PROGRESS NOTES
Chief Complaint   Patient presents with    Vaginal Bleeding     Bleeding after last exam--has checked all other avenues, GI, URO, etc.       History of Present Illness   58 y.o. F patient presents today for postmenopausal bleeding.     Had annual with pap 3/18/2024, 3 weeks ago.   Pap: NILM & HPV neg  At that time, I advised I do not recommend HRT with pellets. She last got pellets 4 months ago. She has been on them for about a year. She is also on Prometrium 200 mg daily    4/2024 TVUS   FINDINGS:  The uterus measures 10.0 x 3.6 x 4.3 cm.  The endometrium measures 1.8 mm.  There is a 1.3 cm fundal fibroid.  Neither ovary is visualized.  There is no free fluid.    CT RENAL STONE STUDY ABD PELVIS WO (3/26/24):   1. Neither kidney demonstrates evidence of obstruction.  No obvious intrarenal, intraureteral, or intravesicular stone is noted.  2. Prominent pancreatic body and tail stable since the prior most likely an anatomical variant.  Correlation with pancreatic enzymes may be of use  3. Stable hepatic hypodensity of 5 mm without worrisome characteristics statistically favored relate to a cyst unchanged since the prior.  In a low risk patient no further follow-up would be necessary.  In a high-risk patient follow-up in 6 months with a liver mass protocol MRI could be performed.  4. Fat containing renal lesions are noted bilaterally suggestive of angiomyolipoma  5. Degenerative changes with protrusion at the L3-L4 and L4-L5 levels    Past medical and surgical history reviewed.   I have reviewed the patient's medical history in detail and updated the computerized patient record.  Review of patient's allergies indicates:  No Known Allergies    Review of Systems  Constitutional: negative for chills and fatigue  Gastrointestinal: negative for abdominal pain, constipation, diarrhea, nausea and vomiting  Genitourinary:negative for abnormal menstrual periods, genital lesions and vaginal discharge, dysuria, frequency and  "hematuria    Physical Examination:  /60   Ht 5' 3" (1.6 m)   Wt 55.3 kg (121 lb 14.6 oz)   BMI 21.60 kg/m²    Physical Exam:   Constitutional: She appears well-developed and well-nourished. No distress.             Abdominal: Soft. There is no abdominal tenderness.     Genitourinary:    Inguinal canal, uterus, right adnexa and left adnexa normal.   The external female genitalia was normal.     Labial bartholins normal.Cervix is normal. There is bleeding in the vagina.                 Suspected uterine bleeding.     Assessment/Plan:  PMB (postmenopausal bleeding)      Advised her EMS is then; however, on exam bleeding appears to be from uterus. I recommend a EMB, she agreed. EMB collected. Advised to stop HRT pellets and continue Prometrium 200 mg daily. If work up is normal, then we can discuss safer alternatives to the pellets.          "

## 2024-04-12 NOTE — PROCEDURES
Endometrial biopsy    Date/Time: 4/12/2024 9:00 AM    Performed by: Sharla Zavala MD  Authorized by: Sharla Zavala MD    Consent:     Consent given by:  Patient    Patient questions answered: yes      Patient agrees, verbalizes understanding, and wants to proceed: yes      Instructions and paperwork completed: yes    Indication:     Indications: Post-menopausal bleeding    Pre-procedure:     Pre-procedure timeout performed: yes    Procedure:     Procedure: endometrial biopsy with Pipelle      Cervix cleaned and prepped: yes      A paracervical block was performed: no      An intracervical block was performed: no      Uterus sounded: yes      Uterus sound depth (cm):  8    Curettes used:  2    Specimen collected: specimen collected and sent to pathology      Patient tolerated procedure well with no complications: yes

## 2024-04-16 LAB
FINAL PATHOLOGIC DIAGNOSIS: NORMAL
GROSS: NORMAL
Lab: NORMAL

## 2024-04-19 ENCOUNTER — PATIENT MESSAGE (OUTPATIENT)
Dept: OBSTETRICS AND GYNECOLOGY | Facility: CLINIC | Age: 59
End: 2024-04-19
Payer: COMMERCIAL

## 2024-04-22 ENCOUNTER — PATIENT MESSAGE (OUTPATIENT)
Dept: RHEUMATOLOGY | Facility: CLINIC | Age: 59
End: 2024-04-22
Payer: COMMERCIAL

## 2024-04-23 ENCOUNTER — PATIENT MESSAGE (OUTPATIENT)
Dept: OBSTETRICS AND GYNECOLOGY | Facility: CLINIC | Age: 59
End: 2024-04-23
Payer: COMMERCIAL

## 2024-04-29 DIAGNOSIS — N95.0 PMB (POSTMENOPAUSAL BLEEDING): Primary | ICD-10-CM

## 2024-04-29 DIAGNOSIS — N84.0 ENDOMETRIAL POLYP: ICD-10-CM

## 2024-05-03 ENCOUNTER — OFFICE VISIT (OUTPATIENT)
Dept: RHEUMATOLOGY | Facility: CLINIC | Age: 59
End: 2024-05-03
Payer: COMMERCIAL

## 2024-05-03 DIAGNOSIS — M48.10 DISH (DIFFUSE IDIOPATHIC SKELETAL HYPEROSTOSIS): Chronic | ICD-10-CM

## 2024-05-03 DIAGNOSIS — M54.2 CERVICALGIA: ICD-10-CM

## 2024-05-03 DIAGNOSIS — R76.8 ANA POSITIVE: Primary | ICD-10-CM

## 2024-05-03 DIAGNOSIS — M25.50 POLYARTHRALGIA: ICD-10-CM

## 2024-05-03 DIAGNOSIS — M46.90 INFLAMMATORY SPONDYLOPATHY, UNSPECIFIED SPINAL REGION: ICD-10-CM

## 2024-05-03 DIAGNOSIS — M54.6 PAIN IN THORACIC SPINE: ICD-10-CM

## 2024-05-03 PROCEDURE — 99214 OFFICE O/P EST MOD 30 MIN: CPT | Mod: 95,,, | Performed by: INTERNAL MEDICINE

## 2024-05-03 PROCEDURE — 1160F RVW MEDS BY RX/DR IN RCRD: CPT | Mod: CPTII,95,, | Performed by: INTERNAL MEDICINE

## 2024-05-03 PROCEDURE — 1159F MED LIST DOCD IN RCRD: CPT | Mod: CPTII,95,, | Performed by: INTERNAL MEDICINE

## 2024-05-03 NOTE — PROGRESS NOTES
Chief Complaint   Patient presents with    Disease Management     The patient location is: home  The chief complaint leading to consultation is: f/u    Visit type: audiovisual    Face to Face time with patient: 20   minutes of total time spent on the encounter, which includes face to face time and non-face to face time preparing to see the patient (eg, review of tests), Obtaining and/or reviewing separately obtained history, Documenting clinical information in the electronic or other health record, Independently interpreting results (not separately reported) and communicating results to the patient/family/caregiver, or Care coordination (not separately reported).         Each patient to whom he or she provides medical services by telemedicine is:  (1) informed of the relationship between the physician and patient and the respective role of any other health care provider with respect to management of the patient; and (2) notified that he or she may decline to receive medical services by telemedicine and may withdraw from such care at any time.    Notes:       History of presenting illness    58 year old female comes asking if she has psoriatic arthritis    She never had a diagnosis of psoriasis    We dont know why the concern was raised    The concern was raised by pain management    Past history : DISH  Thyroid nodule removed  HLD  Insomnia     Family history : sister had surgery on her neck-? Disc issues   Mother has issues with neck,back    Social history :  Former smoker,quit many years ago  Alcohol use not heavy        Review of Systems   Constitutional:  Negative for fever and unexpected weight change.   HENT:  Negative for mouth sores and trouble swallowing.    Eyes:  Negative for redness.   Respiratory:  Negative for cough and shortness of breath.    Cardiovascular:  Negative for chest pain.   Gastrointestinal:  Negative for constipation and diarrhea.   Genitourinary:  Positive for dysuria. Negative for  genital sores.   Skin:  Negative for rash.   Neurological:  Positive for headaches.   Hematological:  Does not bruise/bleed easily.       No malar rash,photosensitivity   No telangiectasias   No calcinosis   No patchy alopecia   Has thinning of hair  No oral and nasal ulcers   No pleurisy or any cardiopulmonary complaints   No dysphagia,diplopia and dysphonia and muscle weakness   No n/v/d/c   No acid reflux+   No cytopenias   No renal issues   No blood clots   No fever,chills,night sweats,weight loss and loss of appetite   No pregnancy losses/pre term deliveries /pregnancy complications   No recurrent conjunctivitis or uveitis or scleritis or episcleritis   No chronic or bloody diarrhea with no u colitis or crohn's /inflammatory bowel disease   No vaginal or  urethral  d/c/STDs/no ulcers   No unexplained lymphadenopathy,parotitis   No seizures,strokes,psychosis  No sclerodactyly  No puffy hands  No perioral tightness       There is currently no information documented on the homunculus. Go to the Rheumatology activity and complete the homunculus joint exam.      Exam not very remarkable    Some limitation with flexion at the cervical spine  Normal extension at the cervical spine  Mild rotatory movements limited at the cervical spine    Normal lumbar spine forward flexion with minimal pain shooting down the left leg  SLRT negative  Normal lumbar spine extension  Normal lumbar spine rotation    Normal SI joint provocative manuevers with no pain    All peripheral joints no synovitis,dactylitis and enthesitis    Physical Exam   Constitutional: She is oriented to person, place, and time. No distress.   HENT:   Head: Normocephalic.   Mouth/Throat: Oropharynx is clear and moist.   Eyes: Pupils are equal, round, and reactive to light. Conjunctivae are normal. Right eye exhibits no discharge. Left eye exhibits no discharge. No scleral icterus.   Neck: No thyromegaly present.   Cardiovascular: Normal rate, regular rhythm and  normal heart sounds.   Pulmonary/Chest: Effort normal and breath sounds normal. No stridor.   Abdominal: Soft. Bowel sounds are normal.   Musculoskeletal:         General: Normal range of motion.      Cervical back: Normal range of motion.   Lymphadenopathy:     She has no cervical adenopathy.   Neurological: She is alert and oriented to person, place, and time.   Skin: Skin is warm. No rash noted. She is not diaphoretic.   Psychiatric: Affect and judgment normal.         Assessment       58 year old white female comes in with    Back pain for 26 years    She would go to chiropractor   She needed adjustment since she had spurs   Tight back pain that would burn  But after some time she would manage it with massages and cream  Until it got worse in 2020    Then got diagnosed with    1) DISH - neck and mid back and low back  Diagnosed in 2020  Severe neck and shoulder pain  Physical therapy and pain management offered    Takes advil vs tylenol    MRI C spine 2022    C2-C3: No disc herniation or significant posterior osseous ridging. No significant spinal canal or foraminal stenosis.     C3-C4: Mild disc osteophyte complex with prominent central component.  Slight ventral cord flattening with thin sliver preserved ventral and preserved dorsal CSF surrounding the cord.  Minimal left foraminal stenosis.     C4-C5: Trace retrolisthesis.  Moderate disc osteophyte complex, eccentric to the right.  Ligamentum flavum thickening.  Mild right ventral cord flattening with thin sliver preserved ventral and dorsal CSF surrounding the cord.  Moderate-severe bilateral foraminal stenosis.     C5-C6: Moderate disc osteophyte complex.  Ligamentum flavum thickening.  Minimal left facet hypertrophy.  Mild ventral cord flattening with thin sliver preserved ventral and preserved dorsal CSF surrounding the cord.  Moderate-severe right and mild-moderate left foraminal stenosis.     C6-C7: Trace retrolisthesis.  Moderate disc osteophyte  complex.  Ligamentum flavum thickening.  Mild right facet hypertrophy.  Mild ventral cord flattening with thin sliver preserved ventral and dorsal CSF surrounding the cord.  Moderate-severe right and moderate left foraminal stenosis.     C7-T1: Mild-moderate disc osteophyte complex.  Mild left facet hypertrophy.  Ligamentum flavum thickening.  Slight cord flattening with preserved ventral and dorsal CSF.  Mild left foraminal stenosis.     Impression:     1. Multilevel spondylosis, greatest at C4-5 through C6-7 where there is mild cord flattening with preserved CSF surrounding the cord and no cord signal abnormality.  2. Multilevel foraminal stenosis, greatest bilaterally at C4-5 and on the right at C5-6 and C6-7 where it is moderate-severe.  3. Bulky anterior osteophyte formation from C3-4 to C6-7.    C spine xray  There is straightening of the cervical spine with loss of normal lordosis.  There was a similar appearance on comparison plain films.  The vertebral bodies maintain normal height and alignment.  There is no change with flexion or extension.  There is marked disc space narrowing at the C4-5 through C6-7 levels where there is endplate sclerosis.  Large bulky anterior osteophytes are present from the C3-4 through C6-7 levels.  These findings were present previously but have progressed.  The odontoid process is intact.  The facet joints maintain normal articulation.  There is multilevel bilateral foraminal stenosis due to changes of uncovertebral spurring and facet joint arthropathy.     C spine MRI 2020              CT abdomen 2024  Degenerative changes with protrusion at the L3-L4 and L4-L5 levels     SI joints nml    T spine xray  Slight scoliotic curvature lower thoracic spine.  No spondylolisthesis.  No displaced fracture with preserved vertebral body heights.  Intact pedicles.  Mild degenerative disc disease several mid thoracic levels with some non bridging anterior and lateral osteophyte formation.   No erosions.  No mineralization along the posterior longitudinal ligament.     LS spine xray  Slight scoliotic curvature of the lumbar spine.  No spondylolisthesis.  No displaced fracture with preserved vertebral body heights.  Mild degenerative disc disease at L2-3 through L4-5.  Facet degenerative change lowest 2 lumbar levels.       [id number] neg    Heating pad,magnesium powder in water, advil and muscle relaxant helps    2) Chronic pain in the entire back  Morning stiffness for 3 hours  Prolonged rest makes her stiff  Picking up things and moving - pain doesn't get worse  She wakes up at 2 am with shoulder and neck and low back pain sometimes    3) Thumb pain- CMC,MCP pain for 6 months   No swollen joints  Nothing else hurts in the hands    Exam not very remarkable    Some limitation with flexion at the cervical spine  Normal extension at the cervical spine  Mild rotatory movements limited at the cervical spine    Normal lumbar spine forward flexion with minimal pain shooting down the left leg  SLRT negative  Normal lumbar spine extension  Normal lumbar spine rotation    Normal SI joint provocative manuevers with no pain    All peripheral joints no synovitis,dactylitis and enthesitis      3)Skin lesions for 20 years  They are hard scaly,silvery patches on the ankles- diagnosis not clear  Red lesions on fingers- start as bumps, on the PIPs-not itchy,not painful        RF,CCP,ESR,CRP nml  ESR,CRP in the past nml    4)   She does not have symptoms of systemic lupus  Except   Has hair thinning  Dry mouth and dry eyes but she takes vyvanse  She has dry skin  Raynaud's,with no digital ulcers  Has headaches but originates in the neck    ARTIE 1: 80,homogenous  SSA,SSB neg  Eastman neg  Eastman RNP neg  Cinthya-1 neg  Dsdna neg  Histone neg  Ribosomal p neg  Cardiolipin ab neg    CBC,CMP nml  Remotely white count was 3.88  H/h nml      Dermatology - are the skin lesions lupus or psoriasis ? This is pending    Completed the lupus  w.u  EVELINE, Urine analysis, UPCR, Cardiolipin ab, beta 2 glycoprotein ab, LA neg    She does not have systemic lupus    She has microscopic hematuria  She has uterine benign polyps, this needs to be removed- she will do a D/C    Beta 2 glycoprotein IgG neg,IgA neg  IgM 115  Cardiolipin ab neg  LA neg  No obstetric or thrombotic events  Since ACL and LA are negative and it is a beta 2 IgM I think risk is very low  But a rpt testing 3 months later is warranted      5) post menopausal bleeding  Uterine polyp      1. ARTIE positive    2. DISH (diffuse idiopathic skeletal hyperostosis)    3. Inflammatory spondylopathy, unspecified spinal region    4. Polyarthralgia    5. Cervicalgia    6. Pain in thoracic spine          Reviewed labs/xrays  Reviewed medications    Ordered labs /xrays    I have independently reviewed her imaging studies but I need expert [facility] radiology to help me diagnose whether this is AS vs DISH vs OA      F/u problem     Plan    MRI SI joints    MRI LS spine    MRI T spine    MRI C spine on tuesday    Submit them for discussion at radio [facility] conference   Main question - syndesmophytes vs osteophytes     APLAS labs ordered to be done -rpt     Rtc in 3 months      Glendy was seen today for disease management.    Diagnoses and all orders for this visit:    ARTIE positive  -     Cardiolipin antibody; Future  -     Beta-2 Glycoprotein Abs (IgA, IgG, IgM); Future  -     DRVVT; Future    DISH (diffuse idiopathic skeletal hyperostosis)    Inflammatory spondylopathy, unspecified spinal region    Polyarthralgia    Cervicalgia    Pain in thoracic spine          Medication changes made  Refilled medications  Toxicity issues discussed    Old records reviewed and summarised  Records are from [facility]      Follow up in 4 weeks    Notes will be sent to PCP    Preventive medicine

## 2024-05-07 ENCOUNTER — HOSPITAL ENCOUNTER (OUTPATIENT)
Dept: RADIOLOGY | Facility: HOSPITAL | Age: 59
Discharge: HOME | End: 2024-05-07
Attending: INTERNAL MEDICINE
Payer: COMMERCIAL

## 2024-05-07 DIAGNOSIS — M54.2 CERVICALGIA: ICD-10-CM

## 2024-05-07 DIAGNOSIS — M54.6 PAIN IN THORACIC SPINE: ICD-10-CM

## 2024-05-07 DIAGNOSIS — M54.9 DORSALGIA, UNSPECIFIED: ICD-10-CM

## 2024-05-07 DIAGNOSIS — M46.90 INFLAMMATORY SPONDYLOPATHY, UNSPECIFIED SPINAL REGION: ICD-10-CM

## 2024-05-07 PROCEDURE — 72141 MRI NECK SPINE W/O DYE: CPT | Mod: 26,,, | Performed by: INTERNAL MEDICINE

## 2024-05-07 PROCEDURE — 72146 MRI CHEST SPINE W/O DYE: CPT | Mod: TC

## 2024-05-07 PROCEDURE — 72146 MRI CHEST SPINE W/O DYE: CPT | Mod: 26,,, | Performed by: INTERNAL MEDICINE

## 2024-05-07 PROCEDURE — 72197 MRI PELVIS W/O & W/DYE: CPT | Mod: TC

## 2024-05-07 PROCEDURE — A9585 GADOBUTROL INJECTION: HCPCS | Performed by: INTERNAL MEDICINE

## 2024-05-07 PROCEDURE — 72148 MRI LUMBAR SPINE W/O DYE: CPT | Mod: TC

## 2024-05-07 PROCEDURE — 72141 MRI NECK SPINE W/O DYE: CPT | Mod: TC

## 2024-05-07 PROCEDURE — 72148 MRI LUMBAR SPINE W/O DYE: CPT | Mod: 26,,, | Performed by: INTERNAL MEDICINE

## 2024-05-07 PROCEDURE — 72197 MRI PELVIS W/O & W/DYE: CPT | Mod: 26,,, | Performed by: INTERNAL MEDICINE

## 2024-05-07 PROCEDURE — 25500020 PHARM REV CODE 255: Performed by: INTERNAL MEDICINE

## 2024-05-07 RX ORDER — GADOBUTROL 604.72 MG/ML
6 INJECTION INTRAVENOUS
Status: COMPLETED | OUTPATIENT
Start: 2024-05-07 | End: 2024-05-07

## 2024-05-07 RX ADMIN — GADOBUTROL 6 ML: 604.72 INJECTION INTRAVENOUS at 04:05

## 2024-05-09 ENCOUNTER — TELEPHONE (OUTPATIENT)
Dept: NEUROSURGERY | Facility: CLINIC | Age: 59
End: 2024-05-09
Payer: COMMERCIAL

## 2024-05-09 ENCOUNTER — PATIENT MESSAGE (OUTPATIENT)
Dept: RHEUMATOLOGY | Facility: CLINIC | Age: 59
End: 2024-05-09
Payer: COMMERCIAL

## 2024-05-09 NOTE — TELEPHONE ENCOUNTER
Called patient to let her know that since she had imaging that she needed to be rescheduled with one of the doctors .

## 2024-05-14 ENCOUNTER — TELEPHONE (OUTPATIENT)
Dept: DERMATOLOGY | Facility: CLINIC | Age: 59
End: 2024-05-14
Payer: COMMERCIAL

## 2024-05-15 ENCOUNTER — OFFICE VISIT (OUTPATIENT)
Dept: OBSTETRICS AND GYNECOLOGY | Facility: CLINIC | Age: 59
End: 2024-05-15
Payer: COMMERCIAL

## 2024-05-15 VITALS — DIASTOLIC BLOOD PRESSURE: 68 MMHG | WEIGHT: 123 LBS | BODY MASS INDEX: 21.79 KG/M2 | SYSTOLIC BLOOD PRESSURE: 118 MMHG

## 2024-05-15 DIAGNOSIS — N95.0 PMB (POSTMENOPAUSAL BLEEDING): ICD-10-CM

## 2024-05-15 DIAGNOSIS — Z01.818 PREOP EXAMINATION: Primary | ICD-10-CM

## 2024-05-15 PROCEDURE — 99499 UNLISTED E&M SERVICE: CPT | Mod: S$GLB,,, | Performed by: OBSTETRICS & GYNECOLOGY

## 2024-05-15 PROCEDURE — 99999 PR PBB SHADOW E&M-EST. PATIENT-LVL III: CPT | Mod: PBBFAC,,, | Performed by: OBSTETRICS & GYNECOLOGY

## 2024-05-15 RX ORDER — SODIUM CHLORIDE 9 MG/ML
INJECTION, SOLUTION INTRAVENOUS CONTINUOUS
Status: CANCELLED | OUTPATIENT
Start: 2024-05-15

## 2024-05-15 NOTE — PROGRESS NOTES
58 y.o.  presents for pre-op H&P for operative hysteroscopy and myosure  for postmenopausal bleeding .  No LMP recorded. Patient is postmenopausal..      Past Medical History:   Diagnosis Date    ADD (attention deficit disorder)     Depression     DISH (diffuse idiopathic skeletal hyperostosis)     High risk HPV infection     Hyperlipidemia     Inflammatory spondylopathy, unspecified spinal region 2023    Insomnia     Nephrolithiasis 2023    Records @ [facility]; CT abd/pelvis w/o contrast w/ 2 mm distal right ureteral stone w/o hydronephrosis; resolved w/o complication; cleared by Urology f/u outpatient    Personal history of colonic polyps 06/15/2023    Thyroid nodule      Past Surgical History:   Procedure Laterality Date    BREAST BIOPSY Left     benign    THYROIDECTOMY      TONSILLECTOMY      TOTAL REDUCTION MAMMOPLASTY Bilateral     breast lift    tummy nelson       Family History   Problem Relation Name Age of Onset    Diabetes Paternal Grandmother      Arthritis Maternal Grandmother      Diabetes Maternal Grandmother      Hypertension Father      Breast cancer Neg Hx      Ovarian cancer Neg Hx      Uterine cancer Neg Hx      Colon cancer Neg Hx       Review of patient's allergies indicates:  No Known Allergies    Current Outpatient Medications:     baclofen (LIORESAL) 10 MG tablet, Take 1 tablet (10 mg total) by mouth 2 (two) times daily as needed (muscle spasms)., Disp: 90 tablet, Rfl: 1    progesterone (PROMETRIUM) 200 MG capsule, Take 200 mg by mouth once daily. Compound, Disp: , Rfl:     VYVANSE 30 mg capsule, Take 30 mg by mouth., Disp: , Rfl:     ondansetron (ZOFRAN) 8 MG tablet, TAKE 1 TABLET BY MOUTH EVERY 6 TO 8 HOURS AS NEEDED FOR NAUSEA AND VOMITING (Patient not taking: Reported on 5/15/2024), Disp: , Rfl:   Social History     Socioeconomic History    Marital status:    Tobacco Use    Smoking status: Former     Current packs/day: 0.00     Types: Cigarettes     Quit  date: 3/19/2016     Years since quittin.1    Smokeless tobacco: Never   Substance and Sexual Activity    Alcohol use: Yes     Alcohol/week: 7.0 standard drinks of alcohol     Types: 7 Glasses of wine per week    Drug use: No    Sexual activity: Yes     Partners: Male     Birth control/protection: Post-menopausal, None     Social Determinants of Health     Financial Resource Strain: Low Risk  (2023)    Overall Financial Resource Strain (CARDIA)     Difficulty of Paying Living Expenses: Not very hard   Food Insecurity: No Food Insecurity (2023)    Hunger Vital Sign     Worried About Running Out of Food in the Last Year: Never true     Ran Out of Food in the Last Year: Never true   Transportation Needs: No Transportation Needs (2023)    PRAPARE - Transportation     Lack of Transportation (Medical): No     Lack of Transportation (Non-Medical): No   Physical Activity: Insufficiently Active (2023)    Exercise Vital Sign     Days of Exercise per Week: 1 day     Minutes of Exercise per Session: 30 min   Stress: Stress Concern Present (2023)    [employer] [place] [employer] - Occupational Stress Questionnaire     Feeling of Stress : To some extent   Housing Stability: Low Risk  (2023)    Housing Stability Vital Sign     Unable to Pay for Housing in the Last Year: No     Number of Places Lived in the Last Year: 1     Unstable Housing in the Last Year: No       ROS:   General: denies fatigue  Cardiovascular: denies dyspnea, orthopnea    Vitals:    05/15/24 0940   BP: 118/68     General Appearance: Alert, appropriate appearance for age. No acute distress,appropriate affect.   Chest/Respiratory: normal, CTA  Cardiovascular: RRR  Abdl: soft, NT/ND  Pelvic Exam Female: Exam deferred.     Assessment: postmenopausal bleeding   Plan: operative hysteroscopy and Myosure  I have discussed the risks, benefits, indications, and alternatives of the procedure in detail.  The patient verbalizes  her understanding.  All questions answered.  Consents signed.  The patient agrees to proceed to proceed as planned.

## 2024-05-16 ENCOUNTER — OFFICE VISIT (OUTPATIENT)
Dept: NEUROSURGERY | Facility: CLINIC | Age: 59
End: 2024-05-16
Payer: COMMERCIAL

## 2024-05-16 VITALS
SYSTOLIC BLOOD PRESSURE: 117 MMHG | DIASTOLIC BLOOD PRESSURE: 75 MMHG | HEIGHT: 63 IN | RESPIRATION RATE: 18 BRPM | BODY MASS INDEX: 21.87 KG/M2 | WEIGHT: 123.44 LBS | HEART RATE: 65 BPM

## 2024-05-16 DIAGNOSIS — M51.26 LUMBAR DISC HERNIATION: ICD-10-CM

## 2024-05-16 DIAGNOSIS — M48.02 CERVICAL SPINAL STENOSIS: Primary | ICD-10-CM

## 2024-05-16 PROCEDURE — 3078F DIAST BP <80 MM HG: CPT | Mod: CPTII,S$GLB,, | Performed by: STUDENT IN AN ORGANIZED HEALTH CARE EDUCATION/TRAINING PROGRAM

## 2024-05-16 PROCEDURE — 1159F MED LIST DOCD IN RCRD: CPT | Mod: CPTII,S$GLB,, | Performed by: STUDENT IN AN ORGANIZED HEALTH CARE EDUCATION/TRAINING PROGRAM

## 2024-05-16 PROCEDURE — 3074F SYST BP LT 130 MM HG: CPT | Mod: CPTII,S$GLB,, | Performed by: STUDENT IN AN ORGANIZED HEALTH CARE EDUCATION/TRAINING PROGRAM

## 2024-05-16 PROCEDURE — 3008F BODY MASS INDEX DOCD: CPT | Mod: CPTII,S$GLB,, | Performed by: STUDENT IN AN ORGANIZED HEALTH CARE EDUCATION/TRAINING PROGRAM

## 2024-05-16 PROCEDURE — 99205 OFFICE O/P NEW HI 60 MIN: CPT | Mod: S$GLB,,, | Performed by: STUDENT IN AN ORGANIZED HEALTH CARE EDUCATION/TRAINING PROGRAM

## 2024-05-16 NOTE — PROGRESS NOTES
"[facility] - Neurosurgery - [facility]  Clinic Consult     Consult Requested By: Sonny Rubio  PCP: Jillian Rosen MD    SUBJECTIVE:     Chief Complaint:   Chief Complaint   Patient presents with    Lumbar Spine Pain (L-Spine)     Constat LBP, right sided with pain down back of leg.  Reports right leg feels "heavy".    Cervical Spine Pain (C-spine)     Constant neck pain, burning into shoulder blades, right greater than left with intermittent numbness and tingling. Frequent headaches about 3x per week starting at the base of the neck and up into the right side of the head.       History of Present Illness:  Glendy Andrade is a 58 y.o. female with HLD and DISH who presents for spinal evaluation. Patient reports diagnosis of DISH approximately 5 years ago. She has managed her neck and back pain conservatively. She reports worsening of neck pain with radiation into the arms to the hands. She reports numbness/tingling. She denies dropping objects or significant difficulties with hand dexterity. She also complains of low back pain with radiation into the right leg.     Pertinent and recent history, provider evaluations, imaging and data reviewed in EPIC        Past Medical History:   Diagnosis Date    ADD (attention deficit disorder)     Depression     DISH (diffuse idiopathic skeletal hyperostosis)     High risk HPV infection     History of MRSA infection     Hyperlipidemia     Inflammatory spondylopathy, unspecified spinal region 03/08/2023    Insomnia     Nephrolithiasis 09/29/2023    Records @ [place]; CT abd/pelvis w/o contrast w/ 2 mm distal right ureteral stone w/o hydronephrosis; resolved w/o complication; cleared by Urology f/u outpatient    Personal history of colonic polyps 06/15/2023    PMB (postmenopausal bleeding)     PONV (postoperative nausea and vomiting)     Thyroid nodule      Past Surgical History:   Procedure Laterality Date    BREAST BIOPSY Left 2011    benign    " THYROIDECTOMY      TONSILLECTOMY      TOTAL REDUCTION MAMMOPLASTY Bilateral 2020    breast lift    tummy nelson       Family History   Problem Relation Name Age of Onset    Diabetes Paternal Grandmother      Arthritis Maternal Grandmother      Diabetes Maternal Grandmother      Hypertension Father      Breast cancer Neg Hx      Ovarian cancer Neg Hx      Uterine cancer Neg Hx      Colon cancer Neg Hx       Social History     Tobacco Use    Smoking status: Former     Current packs/day: 0.00     Types: Cigarettes     Quit date: 3/19/2016     Years since quittin.1    Smokeless tobacco: Never   Substance Use Topics    Alcohol use: Yes     Alcohol/week: 7.0 standard drinks of alcohol     Types: 7 Glasses of wine per week     Comment: occ    Drug use: No      Review of patient's allergies indicates:  No Known Allergies    Current Outpatient Medications:     baclofen (LIORESAL) 10 MG tablet, Take 1 tablet (10 mg total) by mouth 2 (two) times daily as needed (muscle spasms)., Disp: 90 tablet, Rfl: 1    cyanocobalamin (VITAMIN B-12) 1000 MCG tablet, Take 100 mcg by mouth once daily., Disp: , Rfl:     multivitamin (THERAGRAN) per tablet, Take 1 tablet by mouth once daily., Disp: , Rfl:     ondansetron (ZOFRAN) 8 MG tablet, , Disp: , Rfl:     progesterone (PROMETRIUM) 200 MG capsule, Take 200 mg by mouth once daily. Compound, Disp: , Rfl:     vitamin D (VITAMIN D3) 1000 units Tab, Take 1,000 Units by mouth once daily., Disp: , Rfl:     VYVANSE 30 mg capsule, Take 30 mg by mouth daily as needed., Disp: , Rfl:     Review of Systems:   Constitutional: no fever, chills or night sweats. No changes in weight   Eyes: no visual changes   ENT: no nasal congestion or sore throat   Respiratory: no cough or shortness of breath   Cardiovascular: no chest pain or palpitations   Gastrointestinal: no nausea or vomiting   Genitourinary: no hematuria or dysuria   Integument/Breast: no rash or pruritis   Hematologic/Lymphatic: no easy bruising  "or lymphadenopathy   Musculoskeletal: +neck pain, back pain  Neurological: no seizures or tremors +paresthesias   Behavioral/Psych: no auditory or visual hallucinations   Endocrine: no heat or cold intolerance         OBJECTIVE:     Vital Signs (Most Recent):  Pulse: 65 (05/16/24 1202)  Resp: 18 (05/16/24 1202)  BP: 117/75 (05/16/24 1202)  Estimated body mass index is 21.87 kg/m² as calculated from the following:    Height as of this encounter: 5' 3" (1.6 m).    Weight as of this encounter: 56 kg (123 lb 7.3 oz).    Physical Exam:   General: well developed, well nourished, no distress   Neurologic: Alert and oriented. Thought content appropriate. GCS 15.   Head: normocephalic, atraumatic  Eyes: EOMI  Neck: trachea midline, no JVD   Cardiovascular: no LE edema  Pulmonary: normal respirations, no signs of respiratory distress  Abdomen: non-distended  Sensory: intact to light touch throughout  Skin: Skin is warm, dry and intact    Motor Strength: Moves all extremities spontaneously with good tone. No abnormal movements seen.       Deltoids Triceps Biceps Wrist Extension Wrist Flexion Hand  Interossei   Upper: R 5/5 5/5 5/5 5/5 5/5 5/5 5-/5    L 5/5 5/5 5/5 5/5 5/5 5/5 5-/5     Iliopsoas Quadriceps Knee  Flexion Tibialis  anterior Gastro- cnemius EHL    Lower: R 5/5 5/5 5/5 5/5 5/5 5/5     L 5/5 5/5 5/5 5/5 5/5 5/5      DTR's: 2 +   Burton: absent  Clonus: absent    Gait: normal            Diagnostic Results:  I have independently reviewed the following imaging:  MRI cervical/thoracic/lumbar  ALIGNMENT: Straightening of the normal cervical lordosis.  No spondylolisthesis.     BONE: No compression fractures.  Multiple fat containing lesions, the largest at L3, compatible with hemangiomas.  No aggressive marrow replacing lesion.     JOINT: Multilevel degenerative changes with disc desiccation, disc height loss, and facet arthropathy.  There are large anterior osteophytes extending from C3-7.  No ankylosis or " erosions.  No bone marrow edema.     SPINAL CANAL: Subtle T2 hyperintense signal in the cervical cord at the C6-7 level.  The thoracic spinal cord is unremarkable.  The conus medullaris has a normal appearance and terminates at the L1 level.  Cauda equina nerve roots are unremarkable.  No mass or collection.     POSTERIOR FOSSA: Unremarkable.     PARASPINAL SOFT TISSUES: Unremarkable.     SIGNIFICANT FINDINGS BY LEVEL:     C2-3: Unremarkable.     C3-4: Small disc osteophyte complex.  No canal stenosis.  Mild left foraminal stenosis.     C4-5: Disc osteophyte complex, eccentric to the right.  Mild canal stenosis.  Severe right and moderate left foraminal stenosis.     C5-6: Disc osteophyte complex.  Mild canal stenosis.  Moderate bilateral foraminal stenosis.     C6-7: Disc osteophyte complex.  Moderate canal stenosis with near complete effacement of the thecal sac.  Severe bilateral foraminal stenosis.     C7-T1: Disc osteophyte complex.  Mild canal stenosis.  Mild bilateral foraminal stenosis.     Thoracic spine: No focal disc abnormality.  Facet arthropathy resulting in mild canal stenosis at T10-11.  No significant foraminal stenosis.     T12-L1: Right facet arthropathy and ligamentum flavum thickening.  Mild canal stenosis.  Mild right foraminal stenosis.     L1-2: Unremarkable.     L2-3: Disc bulge.  No canal stenosis.  Mild bilateral foraminal stenosis.     L3-4: Disc bulge.  Bilateral facet arthropathy and ligamentum flavum thickening.  Mild canal stenosis.  Mild bilateral foraminal stenosis.     L4-5: Disc bulge.  Superimposed right paracentral extrusion with caudal migration to the L5 pedicle level.  Advanced facet arthropathy and ligamentum flavum thickening bilaterally.  Moderate canal stenosis.  Herniated disc abuts the right descending L5 and S1 nerve roots.  There is abutment of the left descending L5 nerve root as well in the lateral recess.  Severe bilateral foraminal stenosis.     L5-S1: Disc bulge.   Superimposed central protrusion.  Mild canal stenosis.  Herniated disc abuts the descending S1 nerve roots bilaterally.  Mild left foraminal stenosis.           ASSESSMENT/PLAN:     There are no diagnoses linked to this encounter.    Glendy Andrade is a 58 y.o. female  She has been worked up for which she considers a history of psoriatic arthritis.  She has had joint pains diffusely some rashes around her joints  Working with rheumatoid arthritis she is further workup for an autoimmune disorder and panel scheduled she states following up in July.    She does have some lower back pain and radiculopathy down the right leg  Which we discussed treatment options for an appropriate referrals    She works as a seamstress.  And has part of this workup she obtained a cervical MRI which shows known multilevel cervical degeneration with stenosis more so at C3-6 but at C C6-7 level there is a question of some cord signal change  She has an old x-ray with somewhat of a cervical kyphosis multilevel degeneration  Despite all this.  She has very few signs or symptoms of cervical myelopathy.  She has a seamstress she works without an issue.  She denies any new numbness tingling in the extremities.  No new loss of function etc.  Reviewed the complexes the situation with the radiographic findings without significant clinical consequences  In this setting surgery is essentially prophylactic with the risk reviewed in detail  She has significant anterior osteophytes and surgery require a posterior cervical decompression instrumented fusion  In the setting of multilevel degeneration stenosis and kyphosis  With all that.  She will obtain a DEXA scan due to question of demineralization on previous x-rays and CT scan  Physical therapy , new x-rays  And will follow up in 8-12 weeks  Any signs and symptoms of progression she has been thoroughly educated we will follow up sooner                                Patient verbalized understanding  of plan. Encouraged to call with any questions or concerns.     This note was partially dictated using voice recognition software, so please excuse any errors that were not corrected.

## 2024-05-24 PROBLEM — Z98.890 STATUS POST HYSTEROSCOPIC POLYPECTOMY: Status: ACTIVE | Noted: 2024-05-24

## 2024-05-27 ENCOUNTER — HOSPITAL ENCOUNTER (OUTPATIENT)
Dept: RADIOLOGY | Facility: HOSPITAL | Age: 59
Discharge: HOME | End: 2024-05-27
Attending: STUDENT IN AN ORGANIZED HEALTH CARE EDUCATION/TRAINING PROGRAM
Payer: COMMERCIAL

## 2024-05-27 DIAGNOSIS — M48.02 CERVICAL SPINAL STENOSIS: ICD-10-CM

## 2024-05-27 DIAGNOSIS — M51.26 LUMBAR DISC HERNIATION: ICD-10-CM

## 2024-05-27 PROCEDURE — 77080 DXA BONE DENSITY AXIAL: CPT | Mod: 26,,, | Performed by: RADIOLOGY

## 2024-05-27 PROCEDURE — 77080 DXA BONE DENSITY AXIAL: CPT | Mod: TC,PO

## 2024-05-29 ENCOUNTER — HOSPITAL ENCOUNTER (OUTPATIENT)
Dept: RADIOLOGY | Facility: HOSPITAL | Age: 59
Discharge: HOME | End: 2024-05-29
Attending: STUDENT IN AN ORGANIZED HEALTH CARE EDUCATION/TRAINING PROGRAM
Payer: COMMERCIAL

## 2024-05-29 DIAGNOSIS — M48.02 CERVICAL SPINAL STENOSIS: ICD-10-CM

## 2024-05-29 PROCEDURE — 72050 X-RAY EXAM NECK SPINE 4/5VWS: CPT | Mod: TC,FY,PO

## 2024-05-29 PROCEDURE — 72050 X-RAY EXAM NECK SPINE 4/5VWS: CPT | Mod: 26,,, | Performed by: RADIOLOGY

## 2024-05-31 ENCOUNTER — PATIENT MESSAGE (OUTPATIENT)
Dept: RHEUMATOLOGY | Facility: CLINIC | Age: 59
End: 2024-05-31
Payer: COMMERCIAL

## 2024-05-31 ENCOUNTER — TELEPHONE (OUTPATIENT)
Dept: ADMINISTRATIVE | Facility: HOSPITAL | Age: 59
End: 2024-05-31
Payer: COMMERCIAL

## 2024-05-31 ENCOUNTER — TELEPHONE (OUTPATIENT)
Dept: PAIN MEDICINE | Facility: CLINIC | Age: 59
End: 2024-05-31
Payer: COMMERCIAL

## 2024-05-31 NOTE — TELEPHONE ENCOUNTER
Pt wanted to inform us that she wants to discuss her lower spine with Dr. Lennon at her appointment

## 2024-05-31 NOTE — TELEPHONE ENCOUNTER
----- Message from Rosalino Booker sent at 5/31/2024 11:58 AM CDT -----  Regarding: ret call  Contact: patient  Type:  Patient Returning Call    Who Called:  Patient     Who Left Message for Patient:  Chelly Aguilar    Does the patient know what this is regarding?:  yes    Best Call Back Number:  [phone]    Additional Information:  thanks

## 2024-06-03 ENCOUNTER — OFFICE VISIT (OUTPATIENT)
Dept: DERMATOLOGY | Facility: CLINIC | Age: 59
End: 2024-06-03
Payer: COMMERCIAL

## 2024-06-03 ENCOUNTER — OFFICE VISIT (OUTPATIENT)
Dept: PAIN MEDICINE | Facility: CLINIC | Age: 59
End: 2024-06-03
Payer: COMMERCIAL

## 2024-06-03 VITALS
HEIGHT: 62 IN | BODY MASS INDEX: 22.9 KG/M2 | SYSTOLIC BLOOD PRESSURE: 115 MMHG | DIASTOLIC BLOOD PRESSURE: 73 MMHG | HEART RATE: 60 BPM | WEIGHT: 124.44 LBS

## 2024-06-03 DIAGNOSIS — M47.812 CERVICAL SPONDYLOSIS: ICD-10-CM

## 2024-06-03 DIAGNOSIS — M54.12 CERVICAL RADICULOPATHY: ICD-10-CM

## 2024-06-03 DIAGNOSIS — M47.816 LUMBAR SPONDYLOSIS: ICD-10-CM

## 2024-06-03 DIAGNOSIS — M51.36 DDD (DEGENERATIVE DISC DISEASE), LUMBAR: ICD-10-CM

## 2024-06-03 DIAGNOSIS — M06.30 RHEUMATOID NODULE: ICD-10-CM

## 2024-06-03 DIAGNOSIS — M48.02 CERVICAL SPINAL STENOSIS: Primary | ICD-10-CM

## 2024-06-03 PROCEDURE — 99999 PR PBB SHADOW E&M-EST. PATIENT-LVL III: CPT | Mod: PBBFAC,,, | Performed by: DERMATOLOGY

## 2024-06-03 PROCEDURE — 1159F MED LIST DOCD IN RCRD: CPT | Mod: CPTII,S$GLB,, | Performed by: ANESTHESIOLOGY

## 2024-06-03 PROCEDURE — 99999 PR PBB SHADOW E&M-EST. PATIENT-LVL III: CPT | Mod: PBBFAC,,, | Performed by: ANESTHESIOLOGY

## 2024-06-03 PROCEDURE — 1159F MED LIST DOCD IN RCRD: CPT | Mod: CPTII,S$GLB,, | Performed by: DERMATOLOGY

## 2024-06-03 PROCEDURE — 3008F BODY MASS INDEX DOCD: CPT | Mod: CPTII,S$GLB,, | Performed by: ANESTHESIOLOGY

## 2024-06-03 PROCEDURE — 3078F DIAST BP <80 MM HG: CPT | Mod: CPTII,S$GLB,, | Performed by: ANESTHESIOLOGY

## 2024-06-03 PROCEDURE — 99202 OFFICE O/P NEW SF 15 MIN: CPT | Mod: S$GLB,,, | Performed by: DERMATOLOGY

## 2024-06-03 PROCEDURE — 1160F RVW MEDS BY RX/DR IN RCRD: CPT | Mod: CPTII,S$GLB,, | Performed by: DERMATOLOGY

## 2024-06-03 PROCEDURE — 3074F SYST BP LT 130 MM HG: CPT | Mod: CPTII,S$GLB,, | Performed by: ANESTHESIOLOGY

## 2024-06-03 PROCEDURE — 99214 OFFICE O/P EST MOD 30 MIN: CPT | Mod: S$GLB,,, | Performed by: ANESTHESIOLOGY

## 2024-06-03 RX ORDER — TRAMADOL HYDROCHLORIDE 50 MG/1
50 TABLET ORAL 3 TIMES DAILY PRN
Qty: 15 TABLET | Refills: 0 | Status: SHIPPED | OUTPATIENT
Start: 2024-06-03

## 2024-06-03 RX ORDER — METHYLPREDNISOLONE 4 MG/1
TABLET ORAL
Qty: 21 EACH | Refills: 0 | Status: SHIPPED | OUTPATIENT
Start: 2024-06-03 | End: 2024-06-24

## 2024-06-03 NOTE — PROGRESS NOTES
Subjective:      Patient ID:  Glendy Andrade is a 58 y.o. female with a PMHx of DISH (diffuse idiopathic skeletal hyperostosis), + ARTIE (1:80), and inflammatory spondylopathy currently followed by rheumatology who presents for   Chief Complaint   Patient presents with    Rash     HPI  Today, the patient reports a skin rash on her bilateral ankles. States she first noted it after the birth of her last child (about 20 years ago). Since that time, the skin lesions will occasionally become more inflamed, crusty, and occasional drain and then regress but never fully clear. She states they have not increased in size throughout this time span and are persistently asymptomatic. She reports recently developing very small red papules on the knuckles of her right hand but otherwise denies any other rashes or any other areas of involvement.  Review of Systems    Objective:   Physical Exam   Constitutional: She appears well-developed and well-nourished.   Neurological: She is alert and oriented to person, place, and time.   Psychiatric: She has a normal mood and affect.   Skin:          Symmetrical pink nodules on the dorsal ankles bilaterally with central crust  Unable to appreciate significant eruption on the hands        Diagram Legend     Erythematous scaling macule/papule c/w actinic keratosis       Vascular papule c/w angioma      Pigmented verrucoid papule/plaque c/w seborrheic keratosis      Yellow umbilicated papule c/w sebaceous hyperplasia      Irregularly shaped tan macule c/w lentigo     1-2 mm smooth white papules consistent with Milia      Movable subcutaneous cyst with punctum c/w epidermal inclusion cyst      Subcutaneous movable cyst c/w pilar cyst      Firm pink to brown papule c/w dermatofibroma      Pedunculated fleshy papule(s) c/w skin tag(s)      Evenly pigmented macule c/w junctional nevus     Mildly variegated pigmented, slightly irregular-bordered macule c/w mildly atypical nevus      Flesh colored  to evenly pigmented papule c/w intradermal nevus       Pink pearly papule/plaque c/w basal cell carcinoma      Erythematous hyperkeratotic cursted plaque c/w SCC      Surgical scar with no sign of skin cancer recurrence      Open and closed comedones      Inflammatory papules and pustules      Verrucoid papule consistent consistent with wart     Erythematous eczematous patches and plaques     Dystrophic onycholytic nail with subungual debris c/w onychomycosis     Umbilicated papule    Erythematous-base heme-crusted tan verrucoid plaque consistent with inflamed seborrheic keratosis     Erythematous Silvery Scaling Plaque c/w Psoriasis     See annotation              Assessment / Plan:        Rheumatoid nodule  Symmetrical nodules on the ankles bilaterally that have been present for 20 years and have remained asymptomatic. Patient currently with additional systemic findings of hyperostosis (recent dx of DISH) and spondylopathy. At this time, findings are not felt to be concerning for a skin neoplasm and likely favor a benign granulomatous eruption related to her underlying rheumatologic condition (DISH) such as a rheumatoid nodule. Given the length of time that the skin findings have remained present, asymptomatic, and largely unchanged, there is low concern for malignant processes as well as a lupus related dermatitis.   Given the favored benign etiology and asymptomatic nature, no indication for biopsy at this time  Can consider biopsy in the future if skin nodules become symptomatic, worsen, or new nodules develop             No follow-ups on file.

## 2024-06-03 NOTE — PROGRESS NOTES
This note was completed with dictation software and grammatical errors may exist.    CC:  Pain    HPI:  Patient is a 58-year-old woman with a history of hypothyroidism, ADD with a diagnosis of DISH originally presented in referral from Dr. [facility]un [facility] for neck pain.  The patient returns in follow-up today for neck pain and back pain.  The last time I would seen her she was complaining of back pain going into the right leg and we discussed setting up an epidural steroid injection.  However since that time she has been having more neck pain radiating into the arms.  She has seen neurosurgery, Dr. Long and they reviewed an MRI which shows cervical stenosis, possible cord signal change and discussed that she would need posterior decompression and fusion.  Her main complaint today is right-sided low back pain, buttock pain and right thigh pain.  She states that is worse with standing and walking.  She denies any weakness.  She denies any pain in the arms, no numbness or tingling, no balance issues.  She is going on vacation in several weeks to [facility].      Previous history:Patient reports having neck pain for over 20 years, usually worse with work and stress.  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  She reports that the pain is in the neck, left shoulder, midline scapular region and reports having numbness and tingling down the left arm.  She used to have this frequently but lately the arm symptoms have largely resolved.  She reports that she generally gets worsened pain with sitting and the pain is worse in the morning and she gets some relief with medications and heat.  She recently had x-rays of her cervical spine and thoracic spine which show diffuse idiopathic skeletal hyperostosis with significant anterior osteophytes.  She has been having difficulty with swallowing lately but also reports having  some issues with snoring and she is currently being worked up by ENT.    Pain intervention history:No History of injections    Spine surgeries:      Antineuropathics:  Sometimes uses CBD or THC  NSAIDs:  Aspirin, Aleve  Physical therapy:  She has not been doing any physical therapy but does yoga exercises for her neck  Antidepressants:  Muscle relaxers:  Baclofen 10  Opioids:  Antiplatelets/Anticoagulants:        ROS:  She reports fatigue, skin texture change, headaches, vision change, swollen glands, cough, diarrhea, easy bruising, hypothyroidism, blood in the urine, joint stiffness, back pain and anxiety.  Balance of review of systems is negative.    Lab Results   Component Value Date    HGBA1C 4.9 12/13/2023       Lab Results   Component Value Date    WBC 4.28 05/15/2024    HGB 14.0 05/15/2024    HCT 42.1 05/15/2024    MCV 99 (H) 05/15/2024     05/15/2024             Past Medical History:   Diagnosis Date    ADD (attention deficit disorder)     Depression     DISH (diffuse idiopathic skeletal hyperostosis)     High risk HPV infection     History of MRSA infection     Hyperlipidemia     Inflammatory spondylopathy, unspecified spinal region 03/08/2023    Insomnia     Nephrolithiasis 09/29/2023    Records @ [facility]; CT abd/pelvis w/o contrast w/ 2 mm distal right ureteral stone w/o hydronephrosis; resolved w/o complication; cleared by Urology f/u outpatient    Personal history of colonic polyps 06/15/2023    PMB (postmenopausal bleeding)     PONV (postoperative nausea and vomiting)     Thyroid nodule        Past Surgical History:   Procedure Laterality Date    BREAST BIOPSY Left 2011    benign    HYSTEROSCOPIC POLYPECTOMY OF UTERUS N/A 5/24/2024    Procedure: POLYPECTOMY, UTERUS, HYSTEROSCOPIC;  Surgeon: Sharla Zavala MD;  Location: [facility];  Service: OB/GYN;  Laterality: N/A;  with myosure    HYSTEROSCOPY WITH DILATION AND CURETTAGE OF UTERUS N/A 5/24/2024    Procedure: HYSTEROSCOPY, WITH DILATION AND  CURETTAGE OF UTERUS;  Surgeon: Sharla Zavala MD;  Location: [facility];  Service: OB/GYN;  Laterality: N/A;    THYROIDECTOMY      TONSILLECTOMY      TOTAL REDUCTION MAMMOPLASTY Bilateral 2020    breast lift    tummy nelson         Social History     Socioeconomic History    Marital status:    Tobacco Use    Smoking status: Former     Current packs/day: 0.00     Types: Cigarettes     Quit date: 3/19/2016     Years since quittin.2    Smokeless tobacco: Never   Substance and Sexual Activity    Alcohol use: Yes     Alcohol/week: 7.0 standard drinks of alcohol     Types: 7 Glasses of wine per week     Comment: occ    Drug use: No    Sexual activity: Yes     Partners: Male     Birth control/protection: Post-menopausal, None     Social Determinants of Health     Financial Resource Strain: Low Risk  (2023)    Overall Financial Resource Strain (CARDIA)     Difficulty of Paying Living Expenses: Not very hard   Food Insecurity: No Food Insecurity (2023)    Hunger Vital Sign     Worried About Running Out of Food in the Last Year: Never true     Ran Out of Food in the Last Year: Never true   Transportation Needs: No Transportation Needs (2023)    PRAPARE - Transportation     Lack of Transportation (Medical): No     Lack of Transportation (Non-Medical): No   Physical Activity: Insufficiently Active (2023)    Exercise Vital Sign     Days of Exercise per Week: 1 day     Minutes of Exercise per Session: 30 min   Stress: Stress Concern Present (2023)    [employer] - Occupational Stress Questionnaire     Feeling of Stress : To some extent   Housing Stability: Low Risk  (2023)    Housing Stability Vital Sign     Unable to Pay for Housing in the Last Year: No     Number of Places Lived in the Last Year: 1     Unstable Housing in the Last Year: No         Medications/Allergies: See med card    Vitals:    24 0804   BP: 115/73   Pulse: 60   Weight: 56.4 kg (124 lb  "7.2 oz)   Height: 5' 2" (1.575 m)   PainSc:   6   PainLoc: Back     Body mass index is 22.76 kg/m².      6/3/2024     8:05 AM 3/4/2024     8:07 AM 3/21/2022     9:15 AM   Last 3 PDI Scores   Pain Disability Index (PDI) 32 25 21             Physical exam:  Gen: A and O x3, pleasant, well-groomed  Musculoskeletal:  No antalgic gait.     Imagin/3/19 Xray C-spine:  There is straightening of the cervical spine with loss of normal lordosis.  There was a similar appearance on comparison plain films.  The vertebral bodies maintain normal height and alignment.  There is no change with flexion or extension.  There is marked disc space narrowing at the C4-5 through C6-7 levels where there is endplate sclerosis.  Large bulky anterior osteophytes are present from the C3-4 through C6-7 levels.  These findings were present previously but have progressed.  The odontoid process is intact.  The facet joints maintain normal articulation.  There is multilevel bilateral foraminal stenosis due to changes of uncovertebral spurring and facet joint arthropathy.    10/15/20 Xray T-spine:  Slight scoliotic curvature lower thoracic spine.  No spondylolisthesis.  No displaced fracture with preserved vertebral body heights.  Intact pedicles.  Mild degenerative disc disease several mid thoracic levels with some non bridging anterior and lateral osteophyte formation.  No erosions.  No mineralization along the posterior longitudinal ligament.    10/15/20 Xray SI joints:  The sacroiliac joints are well aligned and well maintained.  No significant degenerative changes are present.  No erosive changes are present.  Other bones of the pelvis appear normal.     10/15/20 Xray L-spine: Slight scoliotic curvature of the lumbar spine.  No spondylolisthesis.  No displaced fracture with preserved vertebral body heights.  Mild degenerative disc disease at L2-3 through L4-5.  Facet degenerative change lowest 2 lumbar levels.    24 MRI C-spine and " L-spine:  C2-3: Unremarkable.  C3-4: Small disc osteophyte complex.  No canal stenosis.  Mild left foraminal stenosis.   C4-5: Disc osteophyte complex, eccentric to the right.  Mild canal stenosis.  Severe right and moderate left foraminal stenosis.   C5-6: Disc osteophyte complex.  Mild canal stenosis.  Moderate bilateral foraminal stenosis.   C6-7: Disc osteophyte complex.  Moderate canal stenosis with near complete effacement of the thecal sac.  Severe bilateral foraminal stenosis.   C7-T1: Disc osteophyte complex.  Mild canal stenosis.  Mild bilateral foraminal stenosis.   Thoracic spine: No focal disc abnormality.  Facet arthropathy resulting in mild canal stenosis at T10-11.  No significant foraminal stenosis.   T12-L1: Right facet arthropathy and ligamentum flavum thickening.  Mild canal stenosis.  Mild right foraminal stenosis.   L1-2: Unremarkable.   L2-3: Disc bulge.  No canal stenosis.  Mild bilateral foraminal stenosis.   L3-4: Disc bulge.  Bilateral facet arthropathy and ligamentum flavum thickening.  Mild canal stenosis.  Mild bilateral foraminal stenosis.   L4-5: Disc bulge.  Superimposed right paracentral extrusion with caudal migration to the L5 pedicle level.  Advanced facet arthropathy and ligamentum flavum thickening bilaterally.  Moderate canal stenosis.  Herniated disc abuts the right descending L5 and S1 nerve roots.  There is abutment of the left descending L5 nerve root as well in the lateral recess.  Severe bilateral foraminal stenosis.   L5-S1: Disc bulge.  Superimposed central protrusion.  Mild canal stenosis.  Herniated disc abuts the descending S1 nerve roots bilaterally.  Mild left foraminal stenosis.    5/20/24 Dexa: The L1 to L4 vertebral bone mineral density is equal to 1.168 g/cm squared with a T score of 1.1.   The left femoral neck bone mineral density is equal to 0.768 g/cm squared with a T score of -0.7.   There is a 6.4% risk of a major osteoporotic fracture and a 0.3% risk of  hip fracture in the next 10 years (FRAX).   Impression:   No evidence of significant bone density loss      Assessment:   Patient is a 58-year-old woman with a history of hypothyroidism, ADD with a recent diagnosis of DISH presents in referral from Dr. [facility]un [facility] for neck pain.      1. Cervical spinal stenosis        2. Cervical spondylosis        3. Cervical radiculopathy        4. Lumbar spondylosis        5. DDD (degenerative disc disease), lumbar                Plan:   1.  We reviewed her lumbar spine MRI and cervical spine MRIs.  In terms of her right leg pain, she definitely has right L4/5 protrusion in addition to canal stenosis at that level.  This is likely causing her symptoms, I am going to get her a steroid Dosepak and have given her tramadol.  If she is having significant pain when she returns from her vacation, we could always set up a right L4/5 transforaminal injection.    2.  In terms of her neck pain we discussed that she is not currently having any symptoms from her stenosis but the concern is that she has some cord signal change, she is going to be following up with Dr. Long in July.

## 2024-06-03 NOTE — PROGRESS NOTES
I have seen the patient, reviewed the Resident's progress note. I have personally interviewed and examined the patient at bedside and agree with the findings.     Dea Cantrell MD  [facility]

## 2024-06-04 ENCOUNTER — CLINICAL SUPPORT (OUTPATIENT)
Dept: REHABILITATION | Facility: HOSPITAL | Age: 59
End: 2024-06-04
Attending: STUDENT IN AN ORGANIZED HEALTH CARE EDUCATION/TRAINING PROGRAM
Payer: COMMERCIAL

## 2024-06-04 DIAGNOSIS — M51.26 LUMBAR DISC HERNIATION: ICD-10-CM

## 2024-06-04 DIAGNOSIS — M48.02 CERVICAL SPINAL STENOSIS: ICD-10-CM

## 2024-06-04 DIAGNOSIS — M54.50 CHRONIC RIGHT-SIDED LOW BACK PAIN WITHOUT SCIATICA: Primary | ICD-10-CM

## 2024-06-04 DIAGNOSIS — M62.89 QUADRICEP TIGHTNESS: ICD-10-CM

## 2024-06-04 DIAGNOSIS — M25.69 BACK STIFFNESS: ICD-10-CM

## 2024-06-04 DIAGNOSIS — G89.29 CHRONIC RIGHT-SIDED LOW BACK PAIN WITHOUT SCIATICA: Primary | ICD-10-CM

## 2024-06-04 DIAGNOSIS — M54.2 NECK PAIN: ICD-10-CM

## 2024-06-04 DIAGNOSIS — M43.6 NECK STIFFNESS: ICD-10-CM

## 2024-06-04 DIAGNOSIS — R29.898 WEAKNESS OF BOTH HIPS: ICD-10-CM

## 2024-06-04 DIAGNOSIS — M62.81 WEAKNESS OF TRUNK MUSCULATURE: ICD-10-CM

## 2024-06-04 PROCEDURE — 97110 THERAPEUTIC EXERCISES: CPT | Mod: PN

## 2024-06-04 PROCEDURE — 97530 THERAPEUTIC ACTIVITIES: CPT | Mod: PN

## 2024-06-04 PROCEDURE — 97163 PT EVAL HIGH COMPLEX 45 MIN: CPT | Mod: PN

## 2024-06-04 NOTE — PROGRESS NOTES
[facility]   Physical Therapy Treatment Note      Name: Glendy Andrade  Clinic Number: [id number]    Therapy Diagnosis: No diagnosis found.  Physician: Ganesh Long MD    Visit Date: 6/5/2024    Physician Orders: PT Eval and Treat   Medical Diagnosis from Referral: Cervical spinal stenosis [M48.02], Lumbar disc herniation [M51.26]   Evaluation Date: 6/4/2024  Authorization Period Expiration: 12/31/2024  Plan of Care Expiration: 8/30/2024  Progress Note Due: 7/5/2024  Visit # / Visits authorized: 1 / 20 +eval  FOTO: Issued Visit #: 1/3, (capture on visit 1, 5, 10) first on 6/4/2024     PTA Visit #: 1/5     Precautions: Standard, ADD, depression, history of MRSA infection, inflammatory spondylopathy, DISH (diffuse idiopathic skeletal hyperostosis), insomnia, possible diagnosis of psoriatic arthritis and autoimmune disorder,  MRI which shows cervical stenosis, possible cord signal change (possible need for decompression and fusion surgery).  right L4/5 protrusion in addition to canal stenosis at that level      Time In: 1:06  Time Out: 1:51  Total Appointment Time (timed & untimed codes): 45 minutes      Subjective     Patient reports: was muscle sore yesterday but feeling a bit better now. Currently some discomfort between the shoulder blades and some to the low back and down toward the posterior R hip/knee. States she and her  are leaving town June 14.  She was compliant with home exercise program.  Response to previous treatment: muscle sore but then improved some  Functional change: too soon to tell    Pain: 4/10  Location: neck/upper back and low back/hips    Objective      Added 6mm lift to Right shoe on 6/4/2024    Objective Measures updated at progress report unless specified.     Treatment     Glendy received the treatments listed below:      manual therapy techniques: Joint mobilizations, Myofacial release, and Soft tissue Mobilization were applied to the: neck and low back  "for 10 minutes, including:  -STM along B UT cervical paraspinals, suboccipital release  - 1st rib mobilization (gentle) bilaterally  -Supine gentle PA mob of lower cervical and T1-2   -Supine and sidelying trigger point and IASTM release of Right>Left rectus femoris, vastus lateralis, tensor fascia latae, Iliotibial Band with intermittent stretching  -Supine muscle energy technique correction of Right sided pelvic downslip with AI f/b shotgun technique     NP  -rib area of greatest restriction, thoracic Sidebending   -supine mobilization of lower cervical, CTJ, upper thoracic regions     therapeutic exercises to develop strength, endurance, ROM, flexibility, posture, and core stabilization for 15 minutes including:  (NP) Self IASTM release to Right>Left thigh  Self muscle energy technique (Push/pull without stick) correction of Right sided pelvic downslip with AI  PPT + glut set into bridges 2 x 10  +Supine cervical nod for deep neck flexor strength 10 x 5s     Supine on towel  Hands behind head pec stretch     neuromuscular re-education activities to improve: Coordination, Kinesthetic, Proprioception, Posture, and Motor Control for 20 minutes. The following activities were included:  PPT 10 x 5", mod cues initially for proper activation  PPT + ball squeeze 10 x 5"  PPT + SKFO x 10 each  PPT + Y loop alternating h.abd    therapeutic activities to improve functional performance for 00  minutes, including:  May add at later date      Patient Education and Home Exercises       Education provided:   - avoidance of painful movements as able  - consistent work of stretching, exercises, and HEP  - increasing postural awareness throughout day with tasks and activities     Written Home Exercises Provided: yes. Exercises were reviewed and Glendy was able to demonstrate them prior to the end of the session.  Glendy demonstrated good  understanding of the education provided. See Electronic Medical Record under Patient " Instructions for exercises provided during therapy sessions    Assessment     Patient presents to clinic with tightness of suboccipitals, weakness of deep neck flexors, and tightness through the pecs and upper traps. Symptoms loosened with manual techniques and showed good return demonstration with HEP. Challenged initially with proper core activation with posterior pelvic tilts but did improve with repetition. Pt will benefit from further core and postural strengthening as able to improve scapular mobility and spinal stabilization.    Glendy Is progressing well towards her goals.   Patient prognosis is Good.     Patient will continue to benefit from skilled outpatient physical therapy to address the deficits listed in the problem list box on initial evaluation, provide pt/family education and to maximize pt's level of independence in the home and community environment.     Patient's spiritual, cultural and educational needs considered and pt agreeable to plan of care and goals.     Anticipated Barriers for therapy: Schedule conflicts, transportation, pain, guarding, tolerance, endurance, posture, postural awareness, joint and soft tissue deficits, chronicity of neck and low back pain.    Goals:   Short-Term: 4 weeks (7/5/2024)  Pt will improve Active cervical flexion to > or = 40* to promote return to prior functional status.  Pt will improve Active cervical side bending to > or = 28* to promote return to prior functional status.  Pt will improve Active lumbar Sidebending  to > or = 12* to promote return to prior functional status.  Pt will demonstrate independence with self correction of pelvic alignment and when to perform to reduce pain and  to promote return to prior functional status.  Pt will demonstrate independence with TOS and ULTT stretches and when to perform to reduce pain and  to promote return to prior functional status.  Pt will improve flexibility of Bilateral  quads, hip flexors, gluts, piriformis,  pecs, periscapular, cervical and trunk muscles to help promoted better mobility, posture, alignment, and help return to prior functional status.  Pt will demonstrate improved postural habits as evidenced by improved pelvic alignment,  improved thoracic extension with less forward head posturing, good pelvic alignment with use of heel lift.  Pt will decrease pain levels < or = to 4-5/10 to hep promote appropriate progression of PT, HEP, and ADL's    Pt will be compliant with new home exercise program to assist with PT intervention and help allow appropriate progression through plan of care.     Long-Term: 12 weeks (8/30/2024)  Pt will improve low back, thoracic mobility to WFL to allow return to normal activities of daily living.  Pt will improve cervical strength to > or = 5/5 to allow performance of activities of daily living.  Pt will improve pelvic, trunk and scap-thoracic strength/stabilization to > or = to 4/5 to allow better posture, alignment, and help return to prior functional status.  Pt will have > or = to good flexibility of quads, hip flexors, pecs, neck and periscapula muscles to help promoted better mobility, posture, alignment, and help return to prior functional status.  Pt will demonstrate good postural habits as evidenced by good overall posture and alignment to improve QOL and allow return to prior functional status.  Pt will report < or = 3/10 pain during activities of daily living to improve QOL and allow return to prior functional status.  Pt will be compliant and independent with HEP to allow and maintain better participation in normal activities  Pt will be able to resume normals ADL's, IADL's, previous activities, fitness, and work related tasks     Plan   Plan of care Certification: 6/4/2024 to 8/30/2024.    Continue with current POC toward established physical therapy goals.    Aissatou Hodgson, PTA

## 2024-06-05 ENCOUNTER — CLINICAL SUPPORT (OUTPATIENT)
Dept: REHABILITATION | Facility: HOSPITAL | Age: 59
End: 2024-06-05
Payer: COMMERCIAL

## 2024-06-05 DIAGNOSIS — M25.69 BACK STIFFNESS: ICD-10-CM

## 2024-06-05 DIAGNOSIS — M54.50 CHRONIC RIGHT-SIDED LOW BACK PAIN WITHOUT SCIATICA: ICD-10-CM

## 2024-06-05 DIAGNOSIS — M43.6 NECK STIFFNESS: ICD-10-CM

## 2024-06-05 DIAGNOSIS — M54.2 NECK PAIN: Primary | ICD-10-CM

## 2024-06-05 DIAGNOSIS — M62.81 WEAKNESS OF TRUNK MUSCULATURE: ICD-10-CM

## 2024-06-05 DIAGNOSIS — M62.89 QUADRICEP TIGHTNESS: ICD-10-CM

## 2024-06-05 DIAGNOSIS — R29.898 WEAKNESS OF BOTH HIPS: ICD-10-CM

## 2024-06-05 DIAGNOSIS — G89.29 CHRONIC RIGHT-SIDED LOW BACK PAIN WITHOUT SCIATICA: ICD-10-CM

## 2024-06-05 PROCEDURE — 97110 THERAPEUTIC EXERCISES: CPT | Mod: PN,CQ

## 2024-06-05 PROCEDURE — 97140 MANUAL THERAPY 1/> REGIONS: CPT | Mod: PN,CQ

## 2024-06-05 PROCEDURE — 97112 NEUROMUSCULAR REEDUCATION: CPT | Mod: PN,CQ

## 2024-06-05 NOTE — PLAN OF CARE
[facility]   Physical Therapy Initial Evaluation     Date: 6/4/2024   Name: Glendy Andrade  Clinic Number: [id number]    Therapy Diagnosis:   Encounter Diagnoses   Name Primary?    Cervical spinal stenosis     Lumbar disc herniation     Neck pain     Back stiffness     Neck stiffness     Chronic right-sided low back pain without sciatica Yes    Weakness of trunk musculature     Weakness of both hips     Quadricep tightness      Physician: Ganesh Long MD    Physician Orders: PT Eval and Treat   Medical Diagnosis from Referral: Cervical spinal stenosis [M48.02], Lumbar disc herniation [M51.26]   Evaluation Date: 6/4/2024  Authorization Period Expiration: 12/31/2024  Plan of Care Expiration: 8/30/2024  Progress Note Due: 7/5/2024  Visit # / Visits authorized: 1 / 1   FOTO: Issued Visit #: 1/3, (capture on visit 1, 5, 10) first on 6/4/2024    Precautions: Standard, ADD, depression, history of MRSA infection, inflammatory spondylopathy, DISH (diffuse idiopathic skeletal hyperostosis), insomnia, possible diagnosis of psoriatic arthritis and autoimmune disorder,  MRI which shows cervical stenosis, possible cord signal change (possible need for decompression and fusion surgery).  right L4/5 protrusion in addition to canal stenosis at that level     Time In: 8:50  Time Out: 9:45  Total Appointment Time (timed & untimed codes): 55 minutes      SUBJECTIVE   Date of onset: > 20 years neck and upper back c/o, 5-6 months    History of current condition -   Glendy Andrade is a 58 y.o. female with HLD and DISH who presents for spinal evaluation. Patient reports diagnosis of DISH approximately 5 years ago. She has managed her neck and back pain conservatively. She reports worsening of neck pain with radiation into the arms to the hands. She reports numbness/tingling. She denies dropping objects or significant difficulties with hand dexterity. She also complains of low back pain with radiation into the  right leg.     Today Glendy reports: her Right sciatica is not doing too bad right now.  Overall her LB and Right Lower Extremity has been bothering her more so.  She is mostly use to her neck and upper back c/o.  She is a little aware of decreased strength and stiffness in her Right > Left hand.  Some c/o in her arms and hands at night with sleeping but reports she will place her hands overhead.  She is aware of tightness and restrictions into her neck and thoracic regions.  She has noticed c/o x 5-6 months with low back pain, Right buttock and posterior thigh c/o.  C/o does not go below her knee.  No changes in bowel or bladder function.  She does describe aching for the most part.    Falls: none    Imaging, MRI studies, bone scan films:   6/3/19 Xray C-spine:  There is straightening of the cervical spine with loss of normal lordosis.  There was a similar appearance on comparison plain films.  The vertebral bodies maintain normal height and alignment.  There is no change with flexion or extension.  There is marked disc space narrowing at the C4-5 through C6-7 levels where there is endplate sclerosis.  Large bulky anterior osteophytes are present from the C3-4 through C6-7 levels.  These findings were present previously but have progressed.  The odontoid process is intact.  The facet joints maintain normal articulation.  There is multilevel bilateral foraminal stenosis due to changes of uncovertebral spurring and facet joint arthropathy.     10/15/20 Xray T-spine:  Slight scoliotic curvature lower thoracic spine.  No spondylolisthesis.  No displaced fracture with preserved vertebral body heights.  Intact pedicles.  Mild degenerative disc disease several mid thoracic levels with some non bridging anterior and lateral osteophyte formation.  No erosions.  No mineralization along the posterior longitudinal ligament.     10/15/20 Xray SI joints:  The sacroiliac joints are well aligned and well maintained.  No significant  degenerative changes are present.  No erosive changes are present.  Other bones of the pelvis appear normal.     10/15/20 Xray L-spine: Slight scoliotic curvature of the lumbar spine.  No spondylolisthesis.  No displaced fracture with preserved vertebral body heights.  Mild degenerative disc disease at L2-3 through L4-5.  Facet degenerative change lowest 2 lumbar levels.     5/7/24 MRI C-spine and L-spine:  C2-3: Unremarkable.  C3-4: Small disc osteophyte complex.  No canal stenosis.  Mild left foraminal stenosis.   C4-5: Disc osteophyte complex, eccentric to the right.  Mild canal stenosis.  Severe right and moderate left foraminal stenosis.   C5-6: Disc osteophyte complex.  Mild canal stenosis.  Moderate bilateral foraminal stenosis.   C6-7: Disc osteophyte complex.  Moderate canal stenosis with near complete effacement of the thecal sac.  Severe bilateral foraminal stenosis.   C7-T1: Disc osteophyte complex.  Mild canal stenosis.  Mild bilateral foraminal stenosis.   Thoracic spine: No focal disc abnormality.  Facet arthropathy resulting in mild canal stenosis at T10-11.  No significant foraminal stenosis.   T12-L1: Right facet arthropathy and ligamentum flavum thickening.  Mild canal stenosis.  Mild right foraminal stenosis.   L1-2: Unremarkable.   L2-3: Disc bulge.  No canal stenosis.  Mild bilateral foraminal stenosis.   L3-4: Disc bulge.  Bilateral facet arthropathy and ligamentum flavum thickening.  Mild canal stenosis.  Mild bilateral foraminal stenosis.   L4-5: Disc bulge.  Superimposed right paracentral extrusion with caudal migration to the L5 pedicle level.  Advanced facet arthropathy and ligamentum flavum thickening bilaterally.  Moderate canal stenosis.  Herniated disc abuts the right descending L5 and S1 nerve roots.  There is abutment of the left descending L5 nerve root as well in the lateral recess.  Severe bilateral foraminal stenosis.   L5-S1: Disc bulge.  Superimposed central protrusion.  Mild  canal stenosis.  Herniated disc abuts the descending S1 nerve roots bilaterally.  Mild left foraminal stenosis.     5/20/24 Dexa: The L1 to L4 vertebral bone mineral density is equal to 1.168 g/cm squared with a T score of 1.1.   The left femoral neck bone mineral density is equal to 0.768 g/cm squared with a T score of -0.7.   There is a 6.4% risk of a major osteoporotic fracture and a 0.3% risk of hip fracture in the next 10 years (FRAX).   Impression:   No evidence of significant bone density loss    Prior Therapy: yes, (upper back.  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  LB, Right sacroiliac joint, buttock and posterior thigh  Description: Aching for the most part  Aggravating Factors: work, stress, prolonged neck flexion, prolonged sitting, stiff in the mornings, lifting and carrying  Easing Factors: OTC meds, heat, change positions, CBD creams    Patients goals: get stronger, loosen up tightness, get better and feel better     Medical History:   Past Medical History:   Diagnosis Date    ADD (attention deficit disorder)     Depression     DISH (diffuse idiopathic skeletal hyperostosis)     High risk HPV infection     History of MRSA infection     Hyperlipidemia     Inflammatory spondylopathy, unspecified spinal region 03/08/2023     Insomnia     Nephrolithiasis 09/29/2023    Records @ [facility]; CT abd/pelvis w/o contrast w/ 2 mm distal right ureteral stone w/o hydronephrosis; resolved w/o complication; cleared by Urology f/u outpatient    Personal history of colonic polyps 06/15/2023    PMB (postmenopausal bleeding)     PONV (postoperative nausea and vomiting)     Thyroid nodule        Surgical History:   Glendy Andrade  has a past surgical history that includes Thyroidectomy; Tonsillectomy; Breast biopsy (Left, 2011); Total Reduction Mammoplasty (Bilateral, 2020); tummy tuck; Hysteroscopy with dilation and curettage of uterus (N/A, 5/24/2024); and Hysteroscopic polypectomy of uterus (N/A, 5/24/2024).    Medications:   Glendy has a current medication list which includes the following prescription(s): baclofen, cyanocobalamin, ibuprofen, methylprednisolone, multivitamin, ondansetron, progesterone, tramadol, vitamin d, and vyvanse.    Allergies:   Review of patient's allergies indicates:  No Known Allergies     OBJECTIVE     Posture/alignment:    Sitting:  with mildly slouched posture, increased forward head posture.  Right scapula lower vs Left with Right Sidebending of trunk.    Standing:  with forward head posture, Right scapula and iliac crest resting lower vs Left side.    Supine:  Right sided pelvic downslip with AI.  ~ 6 mm Right leg length discrepancy.     Gait:  walking with no obvious antalgic gait.  Step down sign Right Lower Extremity into Right stance phase due to Right leg length discrepancy.    Cervical AROM:   Flexion                                           38   Extension                                           WNL (decreased mid to upper thoracic)     Left      Right       Rotation  70  70   Sidebending  28  25     Cervical Strength:  Flexion                       5-/5   Extension                       5-/5     Left  Right   Rotation  4/5  4/5   Sidebending  4+/5  4+/5     Shoulder ROM:   Left AROM  Right AROM    Flexion  WNL   WNL   Abduction  WNL  WNL   (Restricted Right scapula rotation, rib and scap-thoracic restrictions)    Lumbar ROM:    AROM    Flexion  WNL   Extension  WNL   Left Sidebending  10   Right Sidebending  10     Special Tests:    Cervical Radiculopathy    Left  Right   Spurling's Test (A-sidebend) negative    positive      Cervical:    (+)/(-)   Compression negative      Distraction negative        Dermatomes:  upper and lower quarters intact to light touch    Myotomes:    5-/5 Bilateral C7 and T1  Otherwise upper and lower quarter 5/5     strength:  Right=  46 lbs    Left=  40 lbs    Joint Mobility:    Cervical:  some mid but mostly lower cervical, CTJ, and upper thoracic restrictions  Thoracic motion/mobility, scap-thoracic, ribs:  gross stiffness in thoracic region  Other:  NT but will need to assess for possible TOS     Hip range of motion/mobility:  Pt positioned in sidelying with hip extension mobility/flexibility R= -15, L= -3.          Left   R  Hip External Rotation:             5-/5   5-/5  Hip abduction:   4-/5   3+/5  Hip extension:   3+/5   4-/5    Flexibility:  Decreased flexibility noted Right>Left rectus femoris, vastus lateralis, tensor fascia latae, Iliotibial Band.      Limitation/Restriction for FOTO NDI Survey    Therapist reviewed FOTO scores for Glendy Andrade on 6/4/2024.   FOTO documents entered into [employer] - see Media section.    Intake Score: 60%.  Predicted 64%.         TREATMENT     Total Treatment time (time-based codes) separate from Evaluation: 30 minutes      Glendy received the treatments listed below:      manual therapy techniques: for 10 minutes, including:  -Supine and sidelying trigger point and IASTM release of Right>Left rectus femoris, vastus lateralis, tensor fascia latae, Iliotibial Band with intermittent stretching  -Supine muscle energy technique correction of Right sided pelvic downslip with AI f/b shotgun technique    NP  -rib area of greatest restriction, thoracic  Sidebending   -supine mobilization of lower cervical, CTJ, upper thoracic regions  -1st rib mobilization    Added 6mm lift to Right shoe on 6/4/2024    therapeutic exercises for 10 minutes including:  Self IASTM release to Right>Left thigh  Self muscle energy technique (push pull with stick) correction of Right sided pelvic downslip with AI  PPT + glut set into bridges    Supine on towel  Hands behind head pec stretch     neuromuscular re-education activities for 00 minutes. The following activities were included:  PPT  PPT + ball squeeze      PPT + Y loop alternating h.abd    therapeutic activities for 10  minutes, including:  Eval findings with education and demonstration regarding posture, postural awareness, joint and soft tissue deficits, body mechanics, arthrokinematics, diagnosis, functional limitations related to deficits and diagnosis.         Plan for Next Visit:  Assess presentation to clinic and patients response to the last visit following IE, manual therapy intervention, therapeutic exercises, pt education, and HEP.  Manual therapy to address alignment, mobility, flexibility, tenderness, soft tissue restrictions and/or presence of trigger points.  Add stretching, stabilization, mobilization and strengthening exercises as appropriate.  Modalities, Dry Needling if indicated.        PATIENT EDUCATION AND HOME EXERCISES     Education provided:   PT provided education and demonstration of HEP to include:   Eval findings with education and demonstration regarding posture, postural awareness, joint and soft tissue deficits, body mechanics, arthrokinematics, diagnosis, functional limitations related to deficits and diagnosis.       Self IASTM release to Right>Left thigh  Self muscle energy technique (push pull with stick) correction of Right sided pelvic downslip with AI  PPT + glut set into bridges    Supine on towel  Hands behind head pec stretch .      Pt was instructed to perform these daily.  Pt was given  instructions to stop use of HEP if there are any adverse reactions/responses and f/u with PT next treatment to discuss.    Home Exercises Provided:  Exercises were reviewed and Glendy was able to demonstrate them prior to the end of the session.  Glendy demonstrated fair  understanding of the education provided.   See EMR under Patient Instructions for exercises provided during therapy sessions.    ASSESSMENT     Glendy is a 58 y.o. female referred to outpatient Physical Therapy with a medical diagnosis of Cervical spinal stenosis [M48.02], Lumbar disc herniation [M51.26].   Patient presents with increased c/o neck and low back pain that varies depending on activities, positions, and movements.  Her c/o can vary from constant to intermittent.  She presents with Right sided pelvic malalignment with AI, increased tightness of Right>Left quads and hip flexors, decreased hip extension mobility/flexibility, decreased core and hip strength, stiffness in her thoracic region up into CTJ region, rib restrictions.  Will also need to access potential presence of TOS and or ULTT.    Patient prognosis is fair to good.   Patient will benefit from skilled outpatient Physical Therapy to address the deficits stated above and in the chart below, provide patient /family education, and to maximize patientt's level of independence.     Plan of care discussed with patient: Yes  Patient's spiritual, cultural and educational needs considered and patient is agreeable to the plan of care and goals as stated below:     Anticipated Barriers for therapy: Schedule conflicts, transportation, pain, guarding, tolerance, endurance, posture, postural awareness, joint and soft tissue deficits, chronicity of neck and low back pain.    Medical Necessity is demonstrated by the following  History  Co-morbidities and personal factors that may impact the plan of care [] LOW: no personal factors / co-morbidities  [] MODERATE: 1-2 personal factors /  co-morbidities  [x] HIGH: 3+ personal factors / co-morbidities    Moderate / High Support Documentation:   Co-morbidities affecting plan of care: See PMHx    Personal Factors:   age  lifestyle  work     Examination  Body Structures and Functions, activity limitations and participation restrictions that may impact the plan of care [] LOW: addressing 1-2 elements  [] MODERATE: 3+ elements  [x] HIGH: 4+ elements (please support below)    Moderate / High Support Documentation: pain, ROM, weakness, postural awareness, joint and soft tissue deficits,      Clinical Presentation [] LOW: stable  [] MODERATE: Evolving  [x] HIGH: Unstable     Decision Making/ Complexity Score: high       Goals:  Short-Term: 4 weeks (7/5/2024)  Pt will improve Active cervical flexion to > or = 40* to promote return to prior functional status.  Pt will improve Active cervical side bending to > or = 28* to promote return to prior functional status.  Pt will improve Active lumbar Sidebending  to > or = 12* to promote return to prior functional status.  Pt will demonstrate independence with self correction of pelvic alignment and when to perform to reduce pain and  to promote return to prior functional status.  Pt will demonstrate independence with TOS and ULTT stretches and when to perform to reduce pain and  to promote return to prior functional status.  Pt will improve flexibility of Bilateral  quads, hip flexors, gluts, piriformis, pecs, periscapular, cervical and trunk muscles to help promoted better mobility, posture, alignment, and help return to prior functional status.  Pt will demonstrate improved postural habits as evidenced by improved pelvic alignment,  improved thoracic extension with less forward head posturing, good pelvic alignment with use of heel lift.  Pt will decrease pain levels < or = to 4-5/10 to hep promote appropriate progression of PT, HEP, and ADL's    Pt will be compliant with new home exercise program to assist with PT  intervention and help allow appropriate progression through plan of care.     Long-Term: 12 weeks (8/30/2024)  Pt will improve low back, thoracic mobility to WFL to allow return to normal activities of daily living.  Pt will improve cervical strength to > or = 5/5 to allow performance of activities of daily living.  Pt will improve pelvic, trunk and scap-thoracic strength/stabilization to > or = to 4/5 to allow better posture, alignment, and help return to prior functional status.  Pt will have > or = to good flexibility of quads, hip flexors, pecs, neck and periscapula muscles to help promoted better mobility, posture, alignment, and help return to prior functional status.  Pt will demonstrate good postural habits as evidenced by good overall posture and alignment to improve QOL and allow return to prior functional status.  Pt will report < or = 3/10 pain during activities of daily living to improve QOL and allow return to prior functional status.  Pt will be compliant and independent with HEP to allow and maintain better participation in normal activities  Pt will be able to resume normals ADL's, IADL's, previous activities, fitness, and work related tasks     PLAN   Plan of care Certification: 6/4/2024 to 8/30/2024.      Outpatient Physical Therapy 1-2 times weekly for 12 weeks to include the following interventions: Electrical Stimulation attended/unattended, Gait Training, Manual Therapy, Moist Heat/ Ice, Neuromuscular Re-ed, Orthotic Management and Training, Patient Education, Therapeutic Activities, Therapeutic Exercise, dry needling and Ultrasound.       Herbie Parisi, PT      I CERTIFY THE NEED FOR THESE SERVICES FURNISHED UNDER THIS PLAN OF TREATMENT AND WHILE UNDER MY CARE   Physician's comments:     Physician's Signature: ___________________________________________________

## 2024-06-05 NOTE — PATIENT INSTRUCTIONS
Education provided:   PT provided education and demonstration of HEP to include:   Eval findings with education and demonstration regarding posture, postural awareness, joint and soft tissue deficits, body mechanics, arthrokinematics, diagnosis, functional limitations related to deficits and diagnosis.       Self IASTM release to Right>Left thigh  Self muscle energy technique (push pull with stick) correction of Right sided pelvic downslip with AI  PPT + glut set into bridges    Supine on towel  Hands behind head pec stretch .      Pt was instructed to perform these daily.  Pt was given instructions to stop use of HEP if there are any adverse reactions/responses and f/u with PT next treatment to discuss.    Home Exercises Provided:  Exercises were reviewed and Glendy was able to demonstrate them prior to the end of the session.  Glendy demonstrated fair  understanding of the education provided.   See EMR under Patient Instructions for exercises provided during therapy sessions.

## 2024-06-10 ENCOUNTER — OFFICE VISIT (OUTPATIENT)
Dept: OBSTETRICS AND GYNECOLOGY | Facility: CLINIC | Age: 59
End: 2024-06-10
Payer: COMMERCIAL

## 2024-06-10 VITALS
WEIGHT: 120.38 LBS | SYSTOLIC BLOOD PRESSURE: 118 MMHG | BODY MASS INDEX: 22.02 KG/M2 | DIASTOLIC BLOOD PRESSURE: 68 MMHG

## 2024-06-10 DIAGNOSIS — N84.0 ENDOMETRIAL POLYP: ICD-10-CM

## 2024-06-10 DIAGNOSIS — N95.0 PMB (POSTMENOPAUSAL BLEEDING): ICD-10-CM

## 2024-06-10 DIAGNOSIS — Z09 POSTOPERATIVE EXAMINATION: Primary | ICD-10-CM

## 2024-06-10 DIAGNOSIS — Z98.890 STATUS POST HYSTEROSCOPIC POLYPECTOMY: ICD-10-CM

## 2024-06-10 PROCEDURE — 99999 PR PBB SHADOW E&M-EST. PATIENT-LVL III: CPT | Mod: PBBFAC,,, | Performed by: OBSTETRICS & GYNECOLOGY

## 2024-06-10 PROCEDURE — 3078F DIAST BP <80 MM HG: CPT | Mod: CPTII,S$GLB,, | Performed by: OBSTETRICS & GYNECOLOGY

## 2024-06-10 PROCEDURE — 99024 POSTOP FOLLOW-UP VISIT: CPT | Mod: S$GLB,,, | Performed by: OBSTETRICS & GYNECOLOGY

## 2024-06-10 PROCEDURE — 3074F SYST BP LT 130 MM HG: CPT | Mod: CPTII,S$GLB,, | Performed by: OBSTETRICS & GYNECOLOGY

## 2024-06-10 PROCEDURE — 1159F MED LIST DOCD IN RCRD: CPT | Mod: CPTII,S$GLB,, | Performed by: OBSTETRICS & GYNECOLOGY

## 2024-06-10 NOTE — PROGRESS NOTES
History of Present Illness:  Pateint presents today 2 weeks postop, status post hysteroscope dilation and curettage 5/24/2024    She is doing well this morning. She is tolerating a regular diet without nausea or vomiting. She is voiding spontaneously. She is ambulating. She has passed flatus, and has a BM. Vaginal bleeding is none. She denies fever or chills. Abdominal pain is mild and controlled with oral medications.     Surgery:   Procedure(s) (LRB):  HYSTEROSCOPY, WITH DILATION AND CURETTAGE OF UTERUS (N/A)  POLYPECTOMY, UTERUS, HYSTEROSCOPIC (N/A)    Operative Findings:   endometrial polyp noted.     Pathology:   ENDOMETRIUM, BIOPSY:   - WEAKLY PROLIFERATIVE ENDOMETRIUM.   - A FEW FRAGMENTS CONSISTENT WITH BENIGN ENDOCERVICAL/LOWER UTERINE    SEGMENT POLYP.               Physical exam:  Vital Signs:   Vitals:    06/10/24 1052   BP: 118/68   Weight: 54.6 kg (120 lb 5.9 oz)     Abdominal: Soft. She exhibits no distension and no mass. There is no rebound and no guarding.   Genitourinary: Deferred    Assessment/Plan:   Postoperative examination    Endometrial polyp    PMB (postmenopausal bleeding)    Status post hysteroscopic polypectomy      Patient states she will no longer get the hormone replacement therapy pellets. She would like to continue progesterone therapy to help with her sleep. She will make another appointment to further discuss hormone replacement therapy options and recommendations.     Follow up   PRN

## 2024-06-10 NOTE — PROGRESS NOTES
[facility]   Physical Therapy Treatment Note      Name: Glendy Andrade  Clinic Number: [id number]    Therapy Diagnosis:   Encounter Diagnoses   Name Primary?    Neck pain Yes    Back stiffness     Neck stiffness     Chronic right-sided low back pain without sciatica     Weakness of trunk musculature     Weakness of both hips     Quadricep tightness      Physician: Ganesh Long MD    Visit Date: 6/11/2024    Physician Orders: PT Eval and Treat   Medical Diagnosis from Referral: Cervical spinal stenosis [M48.02], Lumbar disc herniation [M51.26]   Evaluation Date: 6/4/2024  Authorization Period Expiration: 12/31/2024  Plan of Care Expiration: 8/30/2024  Progress Note Due: 7/5/2024  Visit # / Visits authorized: 2 / 20 +eval  FOTO: Issued Visit #: 1/3, (capture on visit 1, 5, 10) first on 6/4/2024     PTA Visit #: 0/5     Precautions: Standard, ADD, depression, history of MRSA infection, inflammatory spondylopathy, DISH (diffuse idiopathic skeletal hyperostosis), insomnia, possible diagnosis of psoriatic arthritis and autoimmune disorder,  MRI which shows cervical stenosis, possible cord signal change (possible need for decompression and fusion surgery).  right L4/5 protrusion in addition to canal stenosis at that level      Time In: 9:45  Time Out: 10:30  Total Appointment Time (timed & untimed codes): 45 minutes      Subjective     Patient reports: her lower back is feeling really good.  Always feeling some upper back stuff.  She just started steroid pack which seems to be helping a lot.  They leave Friday for [facility] for almost 2 weeks.        She was compliant with home exercise program.  Response to previous treatment: muscle sore but then improved some  Functional change:  doing everything but just having less pain, not hesitating or thinking about it as much    Pain: 2/10 in LB (more so upper back, scap-thoracic, neck 4/10)  Location: neck/upper back and low back/hips    Objective   "    Added 6mm lift to Right shoe on 6/4/2024    Objective Measures updated at progress report unless specified.     Treatment     Glendy received the treatments listed below:      manual therapy techniques: Joint mobilizations, Myofacial release, and Soft tissue Mobilization were applied to the: neck and low back for 15 minutes, including:  -Supine and sidelying trigger point and IASTM release of Right>Left rectus femoris, vastus lateralis, tensor fascia latae, Iliotibial Band with intermittent stretching  -Supine muscle energy technique correction of Right sided pelvic downslip with AI f/b shotgun technique    -rib area of greatest restriction (step forward on Left side, back on Right side) started on Left and then progressed to Right  - thoracic Sidebending   -supine mobilization of lower cervical, side glides and down glides, CTJ stretching/mobilization/MET, upper thoracic regions as well    -STM along B UT cervical paraspinals, suboccipital release  - 1st rib mobilization (gentle) bilaterally  -Supine gentle PA mob of lower cervical and T1-2        therapeutic exercises to develop strength, endurance, ROM, flexibility, posture, and core stabilization for 15 minutes including:  (NP) Self IASTM release to Right>Left thigh  Self muscle energy technique (Push/pull without stick) correction of Right sided pelvic downslip with AI  +Supine cervical nod for deep neck flexor strength 10 x 5-10s     Supine on towel  Hands behind head pec stretch   Thoracic extension + pec stretch with arms straight ~115*    neuromuscular re-education activities to improve: Coordination, Kinesthetic, Proprioception, Posture, and Motor Control for 15 minutes. The following activities were included:  PPT 10 x 5", mod cues initially for proper activation  PPT + ball squeeze 10 x 5"  PPT + glut set into bridges 2 x 10  PPT + alternating clam with G loop x 20  PPT + Y loop alternating h.abd    therapeutic activities to improve functional " performance for 00  minutes, including:  May add at later date      Patient Education and Home Exercises       Education provided:   - avoidance of painful movements as able  - consistent work of stretching, exercises, and HEP  - increasing postural awareness throughout day with tasks and activities     Written Home Exercises Provided: yes. Exercises were reviewed and Glendy was able to demonstrate them prior to the end of the session.  Glendy demonstrated good  understanding of the education provided. See Electronic Medical Record under Patient Instructions for exercises provided during therapy sessions    Assessment     Patient presents to clinic with tightness of suboccipitals, weakness of deep neck flexors, and tightness through the pecs and upper traps. Pelvic malalignment with quad tightness that persists but corrects with manual therapy, thoracic and rib restrictions that improve with manual therapy.  Very restricted CTJ levels.    Symptoms loosened with manual techniques and showed good return demonstration with HEP. Challenged initially with proper core activation with posterior pelvic tilts but did improve with repetition. Pt will benefit from further core and postural strengthening as able to improve scapular mobility and spinal stabilization.    Glendy Is progressing well towards her goals.   Patient prognosis is Good.     Patient will continue to benefit from skilled outpatient physical therapy to address the deficits listed in the problem list box on initial evaluation, provide pt/family education and to maximize pt's level of independence in the home and community environment.     Patient's spiritual, cultural and educational needs considered and pt agreeable to plan of care and goals.     Anticipated Barriers for therapy: Schedule conflicts, transportation, pain, guarding, tolerance, endurance, posture, postural awareness, joint and soft tissue deficits, chronicity of neck and low back pain.    Goals:    Short-Term: 4 weeks (7/5/2024)  Pt will improve Active cervical flexion to > or = 40* to promote return to prior functional status.  Pt will improve Active cervical side bending to > or = 28* to promote return to prior functional status.  Pt will improve Active lumbar Sidebending  to > or = 12* to promote return to prior functional status.  Pt will demonstrate independence with self correction of pelvic alignment and when to perform to reduce pain and  to promote return to prior functional status.  Pt will demonstrate independence with TOS and ULTT stretches and when to perform to reduce pain and  to promote return to prior functional status.  Pt will improve flexibility of Bilateral  quads, hip flexors, gluts, piriformis, pecs, periscapular, cervical and trunk muscles to help promoted better mobility, posture, alignment, and help return to prior functional status.  Pt will demonstrate improved postural habits as evidenced by improved pelvic alignment,  improved thoracic extension with less forward head posturing, good pelvic alignment with use of heel lift.  Pt will decrease pain levels < or = to 4-5/10 to hep promote appropriate progression of PT, HEP, and ADL's    Pt will be compliant with new home exercise program to assist with PT intervention and help allow appropriate progression through plan of care.     Long-Term: 12 weeks (8/30/2024)  Pt will improve low back, thoracic mobility to WFL to allow return to normal activities of daily living.  Pt will improve cervical strength to > or = 5/5 to allow performance of activities of daily living.  Pt will improve pelvic, trunk and scap-thoracic strength/stabilization to > or = to 4/5 to allow better posture, alignment, and help return to prior functional status.  Pt will have > or = to good flexibility of quads, hip flexors, pecs, neck and periscapula muscles to help promoted better mobility, posture, alignment, and help return to prior functional status.  Pt will  demonstrate good postural habits as evidenced by good overall posture and alignment to improve QOL and allow return to prior functional status.  Pt will report < or = 3/10 pain during activities of daily living to improve QOL and allow return to prior functional status.  Pt will be compliant and independent with HEP to allow and maintain better participation in normal activities  Pt will be able to resume normals ADL's, IADL's, previous activities, fitness, and work related tasks     Plan   Plan of care Certification: 6/4/2024 to 8/30/2024.    Continue with current POC toward established physical therapy goals.    Herbie Parisi, PT

## 2024-06-11 ENCOUNTER — CLINICAL SUPPORT (OUTPATIENT)
Dept: REHABILITATION | Facility: HOSPITAL | Age: 59
End: 2024-06-11
Payer: COMMERCIAL

## 2024-06-11 DIAGNOSIS — M62.89 QUADRICEP TIGHTNESS: ICD-10-CM

## 2024-06-11 DIAGNOSIS — M54.2 NECK PAIN: Primary | ICD-10-CM

## 2024-06-11 DIAGNOSIS — M43.6 NECK STIFFNESS: ICD-10-CM

## 2024-06-11 DIAGNOSIS — G89.29 CHRONIC RIGHT-SIDED LOW BACK PAIN WITHOUT SCIATICA: ICD-10-CM

## 2024-06-11 DIAGNOSIS — M25.69 BACK STIFFNESS: ICD-10-CM

## 2024-06-11 DIAGNOSIS — M54.50 CHRONIC RIGHT-SIDED LOW BACK PAIN WITHOUT SCIATICA: ICD-10-CM

## 2024-06-11 DIAGNOSIS — R29.898 WEAKNESS OF BOTH HIPS: ICD-10-CM

## 2024-06-11 DIAGNOSIS — M62.81 WEAKNESS OF TRUNK MUSCULATURE: ICD-10-CM

## 2024-06-11 PROCEDURE — 97140 MANUAL THERAPY 1/> REGIONS: CPT | Mod: PN

## 2024-06-11 PROCEDURE — 97110 THERAPEUTIC EXERCISES: CPT | Mod: PN

## 2024-06-11 PROCEDURE — 97112 NEUROMUSCULAR REEDUCATION: CPT | Mod: PN

## 2024-06-11 NOTE — PROGRESS NOTES
[facility]   Physical Therapy Treatment Note      Name: Glendy Andrade  Clinic Number: [id number]    Therapy Diagnosis:   Encounter Diagnoses   Name Primary?    Neck pain Yes    Back stiffness     Neck stiffness     Chronic right-sided low back pain without sciatica     Weakness of trunk musculature     Weakness of both hips     Quadricep tightness      Physician: Ganesh Long MD    Visit Date: 6/12/2024    Physician Orders: PT Eval and Treat   Medical Diagnosis from Referral: Cervical spinal stenosis [M48.02], Lumbar disc herniation [M51.26]   Evaluation Date: 6/4/2024  Authorization Period Expiration: 12/31/2024  Plan of Care Expiration: 8/30/2024  Progress Note Due: 7/5/2024  Visit # / Visits authorized: 3 / 20 +eval  FOTO: Issued Visit #: 1/3, (capture on visit 1, 5, 10) first on 6/4/2024     PTA Visit #: 0/5     Precautions: Standard, ADD, depression, history of MRSA infection, inflammatory spondylopathy, DISH (diffuse idiopathic skeletal hyperostosis), insomnia, possible diagnosis of psoriatic arthritis and autoimmune disorder,  MRI which shows cervical stenosis, possible cord signal change (possible need for decompression and fusion surgery).  right L4/5 protrusion in addition to canal stenosis at that level      Time In: 12:15  Time Out: 1:00  Total Appointment Time (timed & untimed codes): 45 minutes      Subjective     Patient reports: her lower back is feeling really good.  Always feeling some upper back stuff.  She just started steroid pack which seems to be helping a lot.  They leave Friday for [facility] for almost 2 weeks.        She was compliant with home exercise program.  Response to previous treatment: muscle sore but then improved some  Functional change:  doing everything but just having less pain, not hesitating or thinking about it as much    Pain: 1-2/10 in LB (more so upper back, scap-thoracic, neck 3-4/10)  Location: neck/upper back and low back/hips    Objective   "    Added 6mm lift to Right shoe on 6/4/2024    Objective Measures updated at progress report unless specified.     Treatment     Glendy received the treatments listed below:      manual therapy techniques: Joint mobilizations, Myofacial release, and Soft tissue Mobilization were applied to the: neck and low back for 15 minutes, including:  -Supine and sidelying trigger point and IASTM release of Right>Left rectus femoris, vastus lateralis, tensor fascia latae, Iliotibial Band with intermittent stretching  -Supine muscle energy technique correction of Right sided pelvic downslip with AI f/b shotgun technique    -rib area of greatest restriction (step forward on Left side, back on Right side) started on Left and then progressed to Right  - thoracic Sidebending   -supine mobilization of lower cervical, side glides and down glides, CTJ stretching/mobilization/MET, upper thoracic regions as well  -prone CTJ and upper T-spine P-A mobs (mixed some AROM neck extension to assess mobility)    NP--STM along B UT cervical paraspinals, suboccipital release  - 1st rib mobilization (gentle) bilaterally         therapeutic exercises to develop strength, endurance, ROM, flexibility, posture, and core stabilization for 15 minutes including:  (NP) Self IASTM release to Right>Left thigh  Self muscle energy technique (Push/pull without stick) correction of Right sided pelvic downslip with AI  +Supine cervical chin tuck + neck flexion hold (for deep neck flexor strength) 10 x 5-10s  (very challenging)      Supine on towel  Hands behind head pec stretch   Thoracic extension + pec stretch with arms straight ~115*    neuromuscular re-education activities to improve: Coordination, Kinesthetic, Proprioception, Posture, and Motor Control for 15 minutes. The following activities were included:  PPT 10 x 5", mod cues initially for proper activation  PPT + ball squeeze 10 x 5"  PPT + glut set into bridges 2 x 10  PPT +  clam with G loop x 20  PPT " + clam with G loop into bridges     therapeutic activities to improve functional performance for 00  minutes, including:  May add at later date      Patient Education and Home Exercises       Education provided:   - avoidance of painful movements as able  - consistent work of stretching, exercises, and HEP  - increasing postural awareness throughout day with tasks and activities     Written Home Exercises Provided: yes. Exercises were reviewed and Glendy was able to demonstrate them prior to the end of the session.  Glendy demonstrated good  understanding of the education provided. See Electronic Medical Record under Patient Instructions for exercises provided during therapy sessions    Assessment     Patient presents to clinic with tightness of suboccipitals, weakness of deep neck flexors, and tightness through the pecs and upper traps. Pelvic malalignment with quad tightness that persists but corrects with manual therapy, thoracic and rib restrictions that improve with manual therapy.  Very restricted CTJ levels but improving with MT.    Symptoms loosened with manual techniques and showed good return demonstration with HEP.   Challenged initially with proper core activation with posterior pelvic tilts but did improve with repetition. Needs constant cueing for chin tuck with cervical flexion for deep neck flexors.  Pt will benefit from further core and postural strengthening as able to improve scapular mobility and spinal stabilization.    Glendy Is progressing well towards her goals.   Patient prognosis is Good.     Patient will continue to benefit from skilled outpatient physical therapy to address the deficits listed in the problem list box on initial evaluation, provide pt/family education and to maximize pt's level of independence in the home and community environment.     Patient's spiritual, cultural and educational needs considered and pt agreeable to plan of care and goals.     Anticipated Barriers for  therapy: Schedule conflicts, transportation, pain, guarding, tolerance, endurance, posture, postural awareness, joint and soft tissue deficits, chronicity of neck and low back pain.    Goals:   Short-Term: 4 weeks (7/5/2024)  Pt will improve Active cervical flexion to > or = 40* to promote return to prior functional status.  Pt will improve Active cervical side bending to > or = 28* to promote return to prior functional status.  Pt will improve Active lumbar Sidebending  to > or = 12* to promote return to prior functional status.  Pt will demonstrate independence with self correction of pelvic alignment and when to perform to reduce pain and  to promote return to prior functional status.  Pt will demonstrate independence with TOS and ULTT stretches and when to perform to reduce pain and  to promote return to prior functional status.  Pt will improve flexibility of Bilateral  quads, hip flexors, gluts, piriformis, pecs, periscapular, cervical and trunk muscles to help promoted better mobility, posture, alignment, and help return to prior functional status.  Pt will demonstrate improved postural habits as evidenced by improved pelvic alignment,  improved thoracic extension with less forward head posturing, good pelvic alignment with use of heel lift.  Pt will decrease pain levels < or = to 4-5/10 to hep promote appropriate progression of PT, HEP, and ADL's    Pt will be compliant with new home exercise program to assist with PT intervention and help allow appropriate progression through plan of care.     Long-Term: 12 weeks (8/30/2024)  Pt will improve low back, thoracic mobility to WFL to allow return to normal activities of daily living.  Pt will improve cervical strength to > or = 5/5 to allow performance of activities of daily living.  Pt will improve pelvic, trunk and scap-thoracic strength/stabilization to > or = to 4/5 to allow better posture, alignment, and help return to prior functional status.  Pt will have  > or = to good flexibility of quads, hip flexors, pecs, neck and periscapula muscles to help promoted better mobility, posture, alignment, and help return to prior functional status.  Pt will demonstrate good postural habits as evidenced by good overall posture and alignment to improve QOL and allow return to prior functional status.  Pt will report < or = 3/10 pain during activities of daily living to improve QOL and allow return to prior functional status.  Pt will be compliant and independent with HEP to allow and maintain better participation in normal activities  Pt will be able to resume normals ADL's, IADL's, previous activities, fitness, and work related tasks     Plan   Plan of care Certification: 6/4/2024 to 8/30/2024.    Continue with current POC toward established physical therapy goals.    Herbie Parisi, PT

## 2024-06-12 ENCOUNTER — PATIENT MESSAGE (OUTPATIENT)
Dept: FAMILY MEDICINE | Facility: CLINIC | Age: 59
End: 2024-06-12
Payer: COMMERCIAL

## 2024-06-12 ENCOUNTER — CLINICAL SUPPORT (OUTPATIENT)
Dept: REHABILITATION | Facility: HOSPITAL | Age: 59
End: 2024-06-12
Payer: COMMERCIAL

## 2024-06-12 DIAGNOSIS — M54.2 NECK PAIN: Primary | ICD-10-CM

## 2024-06-12 DIAGNOSIS — Z87.898 HISTORY OF MOTION SICKNESS: Primary | ICD-10-CM

## 2024-06-12 DIAGNOSIS — M25.69 BACK STIFFNESS: ICD-10-CM

## 2024-06-12 DIAGNOSIS — M43.6 NECK STIFFNESS: ICD-10-CM

## 2024-06-12 DIAGNOSIS — G89.29 CHRONIC RIGHT-SIDED LOW BACK PAIN WITHOUT SCIATICA: ICD-10-CM

## 2024-06-12 DIAGNOSIS — M62.81 WEAKNESS OF TRUNK MUSCULATURE: ICD-10-CM

## 2024-06-12 DIAGNOSIS — R29.898 WEAKNESS OF BOTH HIPS: ICD-10-CM

## 2024-06-12 DIAGNOSIS — M62.89 QUADRICEP TIGHTNESS: ICD-10-CM

## 2024-06-12 DIAGNOSIS — M54.50 CHRONIC RIGHT-SIDED LOW BACK PAIN WITHOUT SCIATICA: ICD-10-CM

## 2024-06-12 PROCEDURE — 97140 MANUAL THERAPY 1/> REGIONS: CPT | Mod: PN

## 2024-06-12 PROCEDURE — 97112 NEUROMUSCULAR REEDUCATION: CPT | Mod: PN

## 2024-06-12 PROCEDURE — 97110 THERAPEUTIC EXERCISES: CPT | Mod: PN

## 2024-06-14 RX ORDER — SCOLOPAMINE TRANSDERMAL SYSTEM 1 MG/1
1 PATCH, EXTENDED RELEASE TRANSDERMAL
Qty: 4 PATCH | Refills: 0 | Status: SHIPPED | OUTPATIENT
Start: 2024-06-14 | End: 2024-06-24

## 2024-06-28 ENCOUNTER — CLINICAL SUPPORT (OUTPATIENT)
Dept: REHABILITATION | Facility: HOSPITAL | Age: 59
End: 2024-06-28
Attending: STUDENT IN AN ORGANIZED HEALTH CARE EDUCATION/TRAINING PROGRAM
Payer: COMMERCIAL

## 2024-06-28 DIAGNOSIS — M54.50 CHRONIC RIGHT-SIDED LOW BACK PAIN WITHOUT SCIATICA: ICD-10-CM

## 2024-06-28 DIAGNOSIS — M43.6 NECK STIFFNESS: ICD-10-CM

## 2024-06-28 DIAGNOSIS — M25.69 BACK STIFFNESS: ICD-10-CM

## 2024-06-28 DIAGNOSIS — M54.2 NECK PAIN: Primary | ICD-10-CM

## 2024-06-28 DIAGNOSIS — M62.81 WEAKNESS OF TRUNK MUSCULATURE: ICD-10-CM

## 2024-06-28 DIAGNOSIS — G89.29 CHRONIC RIGHT-SIDED LOW BACK PAIN WITHOUT SCIATICA: ICD-10-CM

## 2024-06-28 DIAGNOSIS — M62.89 QUADRICEP TIGHTNESS: ICD-10-CM

## 2024-06-28 DIAGNOSIS — R29.898 WEAKNESS OF BOTH HIPS: ICD-10-CM

## 2024-06-28 PROCEDURE — 97112 NEUROMUSCULAR REEDUCATION: CPT | Mod: PN

## 2024-06-28 PROCEDURE — 97140 MANUAL THERAPY 1/> REGIONS: CPT | Mod: PN

## 2024-06-28 NOTE — PROGRESS NOTES
[facility]   Physical Therapy Treatment Note      Name: Glendy Andrade  Clinic Number: [id number]    Therapy Diagnosis:   Encounter Diagnoses   Name Primary?    Neck pain Yes    Back stiffness     Neck stiffness     Chronic right-sided low back pain without sciatica     Weakness of trunk musculature     Weakness of both hips     Quadricep tightness        Physician: Ganesh Long MD    Visit Date: 6/28/2024    Physician Orders: PT Eval and Treat   Medical Diagnosis from Referral: Cervical spinal stenosis [M48.02], Lumbar disc herniation [M51.26]   Evaluation Date: 6/4/2024  Authorization Period Expiration: 12/31/2024  Plan of Care Expiration: 8/30/2024  Progress Note Due: 7/5/2024  Visit # / Visits authorized: 3 / 20 +eval  FOTO: Issued Visit #: 1/3, (capture on visit 1, 5, 10) first on 6/4/2024     PTA Visit #: 0/5     Precautions: Standard, ADD, depression, history of MRSA infection, inflammatory spondylopathy, DISH (diffuse idiopathic skeletal hyperostosis), insomnia, possible diagnosis of psoriatic arthritis and autoimmune disorder,  MRI which shows cervical stenosis, possible cord signal change (possible need for decompression and fusion surgery).  right L4/5 protrusion in addition to canal stenosis at that level      Time In: 10:33 am  Time Out: 11:15 am  Total Appointment Time (timed & untimed codes): 40 min      Subjective     Patient reports: No issues with sciatica since taking steroid pack. Upper back is always an issue just feeling tightness in rhomboid areas. Pt states after traveling for 2 weeks and long plane ride she was feeling symptoms in her neck due to positioning. Not having radicular symptoms at the moment, still at baseline with occasional sensations in digits 3-4 in R hand.       She was compliant with home exercise program.  Response to previous treatment: muscle sore but then improved some  Functional change:  doing everything but just having less pain, not  "hesitating or thinking about it as much    Pain: 1-2/10 "tightness vs pain" i  Location: rhomboids only today    Objective      Hypermobile cervical spine    Objective Measures updated at progress report unless specified.     Treatment     Glendy received the treatments listed below:      manual therapy techniques: Joint mobilizations, Myofacial release, and Soft tissue Mobilization were applied to the: neck and low back for 13 minutes, including:    STM trapezius, levator scap, rhomboids bilaterally  Inferior glides T1-T3 grade II-III    NP today:  -Supine and sidelying trigger point and IASTM release of Right>Left rectus femoris, vastus lateralis, tensor fascia latae, Iliotibial Band with intermittent stretching  -Supine muscle energy technique correction of Right sided pelvic downslip with AI f/b shotgun technique    -rib area of greatest restriction (step forward on Left side, back on Right side) started on Left and then progressed to Right  - thoracic Sidebending   -supine mobilization of lower cervical, side glides and down glides, CTJ stretching/mobilization/MET, upper thoracic regions as well  -prone CTJ and upper T-spine P-A mobs (mixed some AROM neck extension to assess mobility)    NP--STM along B UT cervical paraspinals, suboccipital release  - 1st rib mobilization (gentle) bilaterally       therapeutic exercises to develop strength, endurance, ROM, flexibility, posture, and core stabilization for 00 minutes including:  (NP) Self IASTM release to Right>Left thigh  Self muscle energy technique (Push/pull without stick) correction of Right sided pelvic downslip with AI  +Supine cervical chin tuck + neck flexion hold (for deep neck flexor strength) 10 x 5-10s  (very challenging)      Supine on towel  Hands behind head pec stretch   Thoracic extension + pec stretch with arms straight ~115*    neuromuscular re-education activities to improve: Coordination, Kinesthetic, Proprioception, Posture, and Motor " "Control for 27 minutes. The following activities were included:  Cervical:  - Cat cow x 20  - thread the needle quadruped bilateral x 15  - prone chin tuck with retraction (prone on elbows) x 10 3s hold  - cervical isometrics SB, flexion, extension bilaterally 10x each with 3 s hold - cues for neutral cervical posture  - Row YTB 2 x 10    Lumbar: (NP today)  PPT 10 x 5", mod cues initially for proper activation  PPT + ball squeeze 10 x 5"  PPT + glut set into bridges 2 x 10  PPT +  clam with G loop x 20  PPT + clam with G loop into bridges     therapeutic activities to improve functional performance for 00  minutes, including:  May add at later date      Patient Education and Home Exercises       Education provided:   - avoidance of painful movements as able  - consistent work of stretching, exercises, and HEP  - increasing postural awareness throughout day with tasks and activities     Written Home Exercises Provided: yes. Exercises were reviewed and Glendy was able to demonstrate them prior to the end of the session.  Glendy demonstrated good  understanding of the education provided. See Electronic Medical Record under Patient Instructions for exercises provided during therapy sessions    Assessment     Patient presents to clinic with improvement in symptoms with HEP on her vacation. Still feels periscapular and thoracic tightness as main symptom today. Due to hypermobility in cervical spine, added spinal stabilization to address decreased strength and endurance through cervical spine. She tolerated well with reports of pain relief. Cues required for proper neutral spine positioning. Post session pt stated no pain, tightness was resolved. She is progressing well, continue plan of care.     Glendy Is progressing well towards her goals.   Patient prognosis is Good.     Patient will continue to benefit from skilled outpatient physical therapy to address the deficits listed in the problem list box on initial evaluation, " provide pt/family education and to maximize pt's level of independence in the home and community environment.     Patient's spiritual, cultural and educational needs considered and pt agreeable to plan of care and goals.     Anticipated Barriers for therapy: Schedule conflicts, transportation, pain, guarding, tolerance, endurance, posture, postural awareness, joint and soft tissue deficits, chronicity of neck and low back pain.    Goals:   Short-Term: 4 weeks (7/5/2024)  Pt will improve Active cervical flexion to > or = 40* to promote return to prior functional status.  Pt will improve Active cervical side bending to > or = 28* to promote return to prior functional status.  Pt will improve Active lumbar Sidebending  to > or = 12* to promote return to prior functional status.  Pt will demonstrate independence with self correction of pelvic alignment and when to perform to reduce pain and  to promote return to prior functional status.  Pt will demonstrate independence with TOS and ULTT stretches and when to perform to reduce pain and  to promote return to prior functional status.  Pt will improve flexibility of Bilateral  quads, hip flexors, gluts, piriformis, pecs, periscapular, cervical and trunk muscles to help promoted better mobility, posture, alignment, and help return to prior functional status.  Pt will demonstrate improved postural habits as evidenced by improved pelvic alignment,  improved thoracic extension with less forward head posturing, good pelvic alignment with use of heel lift.  Pt will decrease pain levels < or = to 4-5/10 to hep promote appropriate progression of PT, HEP, and ADL's    Pt will be compliant with new home exercise program to assist with PT intervention and help allow appropriate progression through plan of care.     Long-Term: 12 weeks (8/30/2024)  Pt will improve low back, thoracic mobility to WFL to allow return to normal activities of daily living.  Pt will improve cervical  strength to > or = 5/5 to allow performance of activities of daily living.  Pt will improve pelvic, trunk and scap-thoracic strength/stabilization to > or = to 4/5 to allow better posture, alignment, and help return to prior functional status.  Pt will have > or = to good flexibility of quads, hip flexors, pecs, neck and periscapula muscles to help promoted better mobility, posture, alignment, and help return to prior functional status.  Pt will demonstrate good postural habits as evidenced by good overall posture and alignment to improve QOL and allow return to prior functional status.  Pt will report < or = 3/10 pain during activities of daily living to improve QOL and allow return to prior functional status.  Pt will be compliant and independent with HEP to allow and maintain better participation in normal activities  Pt will be able to resume normals ADL's, IADL's, previous activities, fitness, and work related tasks     Plan   Plan of care Certification: 6/4/2024 to 8/30/2024.    Continue with current POC toward established physical therapy goals.    AMISH VILLAR, PT

## 2024-07-07 ENCOUNTER — PATIENT MESSAGE (OUTPATIENT)
Dept: OBSTETRICS AND GYNECOLOGY | Facility: CLINIC | Age: 59
End: 2024-07-07
Payer: COMMERCIAL

## 2024-07-08 ENCOUNTER — LAB VISIT (OUTPATIENT)
Dept: LAB | Facility: HOSPITAL | Age: 59
End: 2024-07-08
Attending: STUDENT IN AN ORGANIZED HEALTH CARE EDUCATION/TRAINING PROGRAM
Payer: COMMERCIAL

## 2024-07-08 DIAGNOSIS — R76.8 ANA POSITIVE: ICD-10-CM

## 2024-07-08 PROCEDURE — 86147 CARDIOLIPIN ANTIBODY EA IG: CPT | Mod: 59 | Performed by: INTERNAL MEDICINE

## 2024-07-08 PROCEDURE — 85613 RUSSELL VIPER VENOM DILUTED: CPT | Performed by: INTERNAL MEDICINE

## 2024-07-08 PROCEDURE — 36415 COLL VENOUS BLD VENIPUNCTURE: CPT | Mod: PN | Performed by: INTERNAL MEDICINE

## 2024-07-08 PROCEDURE — 85610 PROTHROMBIN TIME: CPT | Performed by: INTERNAL MEDICINE

## 2024-07-08 PROCEDURE — 85730 THROMBOPLASTIN TIME PARTIAL: CPT | Performed by: INTERNAL MEDICINE

## 2024-07-08 PROCEDURE — 86146 BETA-2 GLYCOPROTEIN ANTIBODY: CPT | Performed by: INTERNAL MEDICINE

## 2024-07-08 NOTE — PROGRESS NOTES
"[facility]   Physical Therapy Treatment Note      Name: Glendy Andrade  Clinic Number: [id number]    Therapy Diagnosis:   Encounter Diagnoses   Name Primary?    Neck pain Yes    Back stiffness     Neck stiffness     Chronic right-sided low back pain without sciatica     Weakness of trunk musculature     Weakness of both hips     Quadricep tightness        Physician: Ganesh Long MD    Visit Date: 7/9/2024    Physician Orders: PT Eval and Treat   Medical Diagnosis from Referral: Cervical spinal stenosis [M48.02], Lumbar disc herniation [M51.26]   Evaluation Date: 6/4/2024  Authorization Period Expiration: 12/31/2024  Plan of Care Expiration: 8/30/2024  Progress Note Due: 7/5/2024  Visit # / Visits authorized: 5 / 20 +eval  FOTO: Issued Visit #: 1/3, (capture on visit 1, 5, 10) first on 6/4/2024     PTA Visit #: 1/5     Precautions: Standard, ADD, depression, history of MRSA infection, inflammatory spondylopathy, DISH (diffuse idiopathic skeletal hyperostosis), insomnia, possible diagnosis of psoriatic arthritis and autoimmune disorder,  MRI which shows cervical stenosis, possible cord signal change (possible need for decompression and fusion surgery).  right L4/5 protrusion in addition to canal stenosis at that level      Time In: 11:22 am  Time Out: 12:07 am  Total Appointment Time (timed & untimed codes): 45 min      Subjective     Patient reports: R > L neck pain continuing into the head, occasional dull aching that goes up into the suboccipitals and causes dull headache. Had been having mild low back pain and R sciatic symptoms which have dulled significantly since having acupuncture.      She was compliant with home exercise program.  Response to previous treatment: muscle sore but then improved some  Functional change:  doing everything but just having less pain, not hesitating or thinking about it as much    Pain: 3-4/10 "tightness vs pain"  Location: rhomboids only " "today    Objective      Hypermobile cervical spine    Objective Measures updated at progress report unless specified.     Treatment     Glendy received the treatments listed below:      manual therapy techniques: Joint mobilizations, Myofacial release, and Soft tissue Mobilization were applied to the: neck and low back for 15 minutes, including:    STM trapezius, levator scap, rhomboids bilaterally  Suboccipital release, STM along cervical paraspinals  Education on self treatment with tennis balls in a sock for suboccipital release    NP today:  -Supine and sidelying trigger point and IASTM release of Right>Left rectus femoris, vastus lateralis, tensor fascia latae, Iliotibial Band with intermittent stretching  -Supine muscle energy technique correction of Right sided pelvic downslip with AI f/b shotgun technique    -rib area of greatest restriction (step forward on Left side, back on Right side) started on Left and then progressed to Right  - thoracic Sidebending   -supine mobilization of lower cervical, side glides and down glides, CTJ stretching/mobilization/MET, upper thoracic regions as well  -prone CTJ and upper T-spine P-A mobs (mixed some AROM neck extension to assess mobility)    NP--STM along B UT cervical paraspinals, suboccipital release  - 1st rib mobilization (gentle) bilaterally       therapeutic exercises to develop strength, endurance, ROM, flexibility, posture, and core stabilization for 10 minutes including:  (NP) Self IASTM release to Right>Left thigh  Self muscle energy technique (Push/pull without stick) correction of Right sided pelvic downslip with AI  +Supine cervical nod with tennis balls at suboccipitals 10 x 5"  Supine cervical chin tuck + neck flexion hold (for deep neck flexor strength) 10 x 5-10s  (extremely challenging)      Supine on towel  Hands behind head pec stretch   NP--Thoracic extension + pec stretch with arms straight ~115*    neuromuscular re-education activities to improve: " "Coordination, Kinesthetic, Proprioception, Posture, and Motor Control for 15 minutes. The following activities were included:  Cervical:  - Cat cow x 20  - thread the needle quadruped bilateral x 15  - prone chin tuck with retraction (prone on elbows) x 10 3s hold  - cervical isometrics SB,, rotation, flexion, extension bilaterally 10x each with 3 s hold - cues for neutral cervical posture  NP-- Row YTB 2 x 10    Lumbar: (NP today)  PPT 10 x 5", mod cues initially for proper activation  PPT + ball squeeze 10 x 5"  PPT + glut set into bridges 2 x 10  PPT +  clam with G loop x 20  PPT + clam with G loop into bridges     therapeutic activities to improve functional performance for 05 minutes, including:  Education on use of cervical roll along pillow edge for neck support  Anatomy and postural education on proper neck alignment at rest and with functional activities to avoid over straining and poor postural mechanics   May add at later date      Patient Education and Home Exercises       Education provided:   - avoidance of painful movements as able  - consistent work of stretching, exercises, and HEP  - increasing postural awareness throughout day with tasks and activities     Written Home Exercises Provided: yes. Exercises were reviewed and Glendy was able to demonstrate them prior to the end of the session.  Glendy demonstrated good  understanding of the education provided. See Electronic Medical Record under Patient Instructions for exercises provided during therapy sessions    Assessment     Patient presents to clinic with continued tightness R > L upper trap and suboccipitals. Tightness and weakness limiting cranial flexion with cervical nods which did improve with tennis ball release but head lift remained very challenging. Weakness of postural stabilizers for the shoulders and neck continues but awareness and strength is slowly improving. Further education given to patient in regard to neck support with sleeping, " postural awareness with daily and work activities, and importance of further progressing strength as able.     Glendy Is progressing well towards her goals.   Patient prognosis is Good.     Patient will continue to benefit from skilled outpatient physical therapy to address the deficits listed in the problem list box on initial evaluation, provide pt/family education and to maximize pt's level of independence in the home and community environment.     Patient's spiritual, cultural and educational needs considered and pt agreeable to plan of care and goals.     Anticipated Barriers for therapy: Schedule conflicts, transportation, pain, guarding, tolerance, endurance, posture, postural awareness, joint and soft tissue deficits, chronicity of neck and low back pain.    Goals:   Short-Term: 4 weeks (7/5/2024)  Pt will improve Active cervical flexion to > or = 40* to promote return to prior functional status.  Pt will improve Active cervical side bending to > or = 28* to promote return to prior functional status.  Pt will improve Active lumbar Sidebending  to > or = 12* to promote return to prior functional status.  Pt will demonstrate independence with self correction of pelvic alignment and when to perform to reduce pain and  to promote return to prior functional status.  Pt will demonstrate independence with TOS and ULTT stretches and when to perform to reduce pain and  to promote return to prior functional status.  Pt will improve flexibility of Bilateral  quads, hip flexors, gluts, piriformis, pecs, periscapular, cervical and trunk muscles to help promoted better mobility, posture, alignment, and help return to prior functional status.  Pt will demonstrate improved postural habits as evidenced by improved pelvic alignment,  improved thoracic extension with less forward head posturing, good pelvic alignment with use of heel lift.  Pt will decrease pain levels < or = to 4-5/10 to hep promote appropriate progression of  PT, HEP, and ADL's    Pt will be compliant with new home exercise program to assist with PT intervention and help allow appropriate progression through plan of care.     Long-Term: 12 weeks (8/30/2024)  Pt will improve low back, thoracic mobility to WFL to allow return to normal activities of daily living.  Pt will improve cervical strength to > or = 5/5 to allow performance of activities of daily living.  Pt will improve pelvic, trunk and scap-thoracic strength/stabilization to > or = to 4/5 to allow better posture, alignment, and help return to prior functional status.  Pt will have > or = to good flexibility of quads, hip flexors, pecs, neck and periscapula muscles to help promoted better mobility, posture, alignment, and help return to prior functional status.  Pt will demonstrate good postural habits as evidenced by good overall posture and alignment to improve QOL and allow return to prior functional status.  Pt will report < or = 3/10 pain during activities of daily living to improve QOL and allow return to prior functional status.  Pt will be compliant and independent with HEP to allow and maintain better participation in normal activities  Pt will be able to resume normals ADL's, IADL's, previous activities, fitness, and work related tasks     Plan   Plan of care Certification: 6/4/2024 to 8/30/2024.    Continue with current POC toward established physical therapy goals.    Aissatou Hodgson, PTA

## 2024-07-09 ENCOUNTER — OFFICE VISIT (OUTPATIENT)
Dept: OBSTETRICS AND GYNECOLOGY | Facility: CLINIC | Age: 59
End: 2024-07-09
Payer: COMMERCIAL

## 2024-07-09 ENCOUNTER — CLINICAL SUPPORT (OUTPATIENT)
Dept: REHABILITATION | Facility: HOSPITAL | Age: 59
End: 2024-07-09
Payer: COMMERCIAL

## 2024-07-09 VITALS
WEIGHT: 123.88 LBS | DIASTOLIC BLOOD PRESSURE: 60 MMHG | BODY MASS INDEX: 21.95 KG/M2 | SYSTOLIC BLOOD PRESSURE: 94 MMHG | HEIGHT: 63 IN

## 2024-07-09 DIAGNOSIS — M54.2 NECK PAIN: Primary | ICD-10-CM

## 2024-07-09 DIAGNOSIS — N95.0 PMB (POSTMENOPAUSAL BLEEDING): Primary | ICD-10-CM

## 2024-07-09 DIAGNOSIS — M43.6 NECK STIFFNESS: ICD-10-CM

## 2024-07-09 DIAGNOSIS — M62.89 QUADRICEP TIGHTNESS: ICD-10-CM

## 2024-07-09 DIAGNOSIS — M25.69 BACK STIFFNESS: ICD-10-CM

## 2024-07-09 DIAGNOSIS — R29.898 WEAKNESS OF BOTH HIPS: ICD-10-CM

## 2024-07-09 DIAGNOSIS — M62.81 WEAKNESS OF TRUNK MUSCULATURE: ICD-10-CM

## 2024-07-09 DIAGNOSIS — G89.29 CHRONIC RIGHT-SIDED LOW BACK PAIN WITHOUT SCIATICA: ICD-10-CM

## 2024-07-09 DIAGNOSIS — M54.50 CHRONIC RIGHT-SIDED LOW BACK PAIN WITHOUT SCIATICA: ICD-10-CM

## 2024-07-09 PROCEDURE — 97112 NEUROMUSCULAR REEDUCATION: CPT | Mod: PN,CQ

## 2024-07-09 PROCEDURE — 99213 OFFICE O/P EST LOW 20 MIN: CPT | Mod: S$GLB,,, | Performed by: OBSTETRICS & GYNECOLOGY

## 2024-07-09 PROCEDURE — 3008F BODY MASS INDEX DOCD: CPT | Mod: CPTII,S$GLB,, | Performed by: OBSTETRICS & GYNECOLOGY

## 2024-07-09 PROCEDURE — 97110 THERAPEUTIC EXERCISES: CPT | Mod: PN,CQ

## 2024-07-09 PROCEDURE — 99999 PR PBB SHADOW E&M-EST. PATIENT-LVL III: CPT | Mod: PBBFAC,,, | Performed by: OBSTETRICS & GYNECOLOGY

## 2024-07-09 PROCEDURE — 3078F DIAST BP <80 MM HG: CPT | Mod: CPTII,S$GLB,, | Performed by: OBSTETRICS & GYNECOLOGY

## 2024-07-09 PROCEDURE — 97140 MANUAL THERAPY 1/> REGIONS: CPT | Mod: PN,CQ

## 2024-07-09 PROCEDURE — 3074F SYST BP LT 130 MM HG: CPT | Mod: CPTII,S$GLB,, | Performed by: OBSTETRICS & GYNECOLOGY

## 2024-07-09 PROCEDURE — 1159F MED LIST DOCD IN RCRD: CPT | Mod: CPTII,S$GLB,, | Performed by: OBSTETRICS & GYNECOLOGY

## 2024-07-09 NOTE — PROGRESS NOTES
Chief Complaint   Patient presents with    Vaginal Bleeding     Bleeding for last 5-6 days, very light       History of Present Illness   58 y.o. F patient presents today for postmenopausal bleeding.     Annual 3/18/2024 Pap: NILM & HPV neg. I advised I do not recommend HRT with pellets. She last got pellets 4 months ago. She has been on them for about a year. She is also on Prometrium 200 mg daily    4/9/2024TVUS FINDINGS: The uterus measures 10.0 x 3.6 x 4.3 cm.  The endometrium measures 1.8 mm.  There is a 1.3 cm fundal fibroid.  Neither ovary is visualized.  There is no free fluid.  4/12/2024 complains of postmenopausal bleeding. Advised her EMS is thin; however, on exam bleeding appears to be from uterus. I recommend a EMB, she agreed. EMB collected. Advised to stop HRT pellets and continue Prometrium 200 mg daily. If work up is normal, then we can discuss safer alternatives to the pellets.    EMB: Fragments of inactive endometrium, one fragment has prominent vessels suggestive of benign polyp formation.  No atypical hyperplasia or malignancy identified.     4/19/2024 complains of persistent bleeding  5/24/2024 Status post hysteroscope dilation and curettage  Pathology: ENDOMETRIUM, BIOPSY:   - WEAKLY PROLIFERATIVE ENDOMETRIUM.   - A FEW FRAGMENTS CONSISTENT WITH BENIGN ENDOCERVICAL/LOWER UTERINE SEGMENT POLYP.     6/10/2024 Patient states she will no longer get the hormone replacement therapy pellets. She would like to continue progesterone therapy to help with her sleep.     Today, 7/9/2024, complains of persistent bleeding. She is taking Prometrium at night.     Past medical and surgical history reviewed.   I have reviewed the patient's medical history in detail and updated the computerized patient record.  Review of patient's allergies indicates:  No Known Allergies    Review of Systems  Constitutional: negative for chills and fatigue  Gastrointestinal: negative for abdominal pain, constipation, diarrhea,  "nausea and vomiting  Genitourinary:negative for abnormal menstrual periods, genital lesions and vaginal discharge, dysuria, frequency and hematuria    Physical Examination:  BP 94/60   Ht 5' 3" (1.6 m)   Wt 56.2 kg (123 lb 14.4 oz)   BMI 21.95 kg/m²    Physical Exam:   Constitutional: She appears well-developed and well-nourished. No distress.             Abdominal: Soft. There is no abdominal tenderness.     Genitourinary:    Inguinal canal, uterus, right adnexa and left adnexa normal.   The external female genitalia was normal.     Labial bartholins normal.Cervix is normal. There is bleeding in the vagina.                   Assessment/Plan:  PMB (postmenopausal bleeding)  -     US Pelvis Comp with Transvag NON-OB (xpd; Future; Expected date: 07/09/2024      Continue Prometrium 200 mg daily. Consider switching to stronger progestin vs adding estrogen based on TVUS results.     I spent a total of 20 minutes on the day of the visit.This includes face to face time and non-face to face time preparing to see the patient (eg, review of tests), obtaining and/or reviewing separately obtained history, documenting clinical information in the electronic or other health record, independently interpreting results and communicating results to the patient/family/caregiver, or care coordinator.         "

## 2024-07-10 LAB
CONFIRM DRVVT STA-STACLOT: ABNORMAL S
DRVVT SCREEN TO CONFIRM RATIO: ABNORMAL {RATIO}
HEPARIN NT PPP QL: ABNORMAL
LA 3 SCREEN W REFLEX-IMP: ABNORMAL
LMW HEPARIN IND PLT AB SER QL: ABNORMAL
MIXING DRVVT/NORMAL: ABNORMAL %
NEUTRALIZED DRVVT SCREEN RATIO: ABNORMAL
PROTHROMBIN TIME: 11.2 S (ref 12–15.5)
SCREEN APTT/NORMAL: 0.98
SCREEN APTT/NORMAL: ABNORMAL
SCREEN DRVVT/NORMAL: 0.97 %
THROMBIN TIME: ABNORMAL S

## 2024-07-11 DIAGNOSIS — R76.0 ANTIPHOSPHOLIPID ANTIBODY POSITIVE: Primary | ICD-10-CM

## 2024-07-11 LAB
B2 GLYCOPROT1 IGA SER QL: 1.1 U/ML
B2 GLYCOPROT1 IGG SER QL: 1.6 U/ML
B2 GLYCOPROT1 IGM SER QL: 109 U/ML
CARDIOLIPIN IGG SER IA-ACNC: <9.4 GPL (ref 0–14.99)
CARDIOLIPIN IGM SER IA-ACNC: <9.4 MPL (ref 0–12.49)

## 2024-07-11 NOTE — PROGRESS NOTES
[facility]   Physical Therapy Treatment Note      Name: Glendy Andrade  Clinic Number: [id number]    Therapy Diagnosis:   Encounter Diagnoses   Name Primary?    Neck pain Yes    Back stiffness     Neck stiffness     Chronic right-sided low back pain without sciatica     Weakness of trunk musculature     Weakness of both hips     Quadricep tightness      Physician: Ganesh Long MD    Visit Date: 7/12/2024    Physician Orders: PT Eval and Treat   Medical Diagnosis from Referral: Cervical spinal stenosis [M48.02], Lumbar disc herniation [M51.26]   Evaluation Date: 6/4/2024  Authorization Period Expiration: 12/31/2024  Plan of Care Expiration: 8/30/2024  Progress Note Due: 8/2/2024 (Last Progress Note 7/12/2024)  Visit # / Visits authorized: 6 / 20 +eval  FOTO: Issued Visit #: 1/3, (capture on visit 1, 5, 10) first on 6/4/2024     PTA Visit #: 0/5     Precautions: Standard, ADD, depression, history of MRSA infection, inflammatory spondylopathy, DISH (diffuse idiopathic skeletal hyperostosis), insomnia, possible diagnosis of psoriatic arthritis and autoimmune disorder,  MRI which shows cervical stenosis, possible cord signal change (possible need for decompression and fusion surgery).  right L4/5 protrusion in addition to canal stenosis at that level      Time In: 11:15  Time Out: 12:00  Total Appointment Time (timed & untimed codes): 45 min      Subjective     Patient reports: She got some acupuncture the other day and it helped so much.  She did well while out of town.  Got a steroid dose pack that helped.  R > L neck pain continuing into the head, occasional dull aching that goes up into the suboccipitals and causes dull headache.   Today mostly feeling a little HA, some soreness over her shoulders and scapulae, some increased tension in her neck.    She was compliant with home exercise program.  Response to previous treatment: muscle sore but then improved some  Functional change:  doing  "everything but just having less pain, not hesitating or thinking about it as much    Pain: 6/10 "tightness vs pain"  Location: rhomboids only today    Objective      Posture/alignment:    Sitting:  with mildly slouched posture, increased forward head posture.  Right scapula lower vs Left with Right Sidebending of trunk.     Standing:  with forward head posture, Right scapula and iliac crest resting lower vs Left side.     Supine:  Right sided pelvic downslip with AI.  ~ 6 mm Right leg length discrepancy.     Gait:  walking with no obvious antalgic gait.  Step down sign Right Lower Extremity into Right stance phase due to Right leg length discrepancy.     Cervical AROM:        Flexion                                           40 was 38   Extension                                           WNL (decreased mid to upper thoracic)      Left      Right       Rotation  70  70   Sidebending  28  25      Cervical Strength:       Flexion                       5-/5   Extension                       5-/5      Left  Right   Rotation  4+/5 was 4/5  4+/5 was 4/5   Sidebending  4+/5  4+/5      Shoulder ROM:    Left AROM  Right AROM    Flexion  WNL  WNL   Abduction  WNL  WNL   (Restricted Right scapula rotation, rib and scap-thoracic restrictions)     Lumbar ROM:     AROM    Flexion  WNL   Extension  WNL   Left Sidebending  10   Right Sidebending  10      Special Tests:     Cervical Radiculopathy     Left  Right   Spurling's Test (A-sidebend) negative    positive      Cervical:     (+)/(-)   Compression negative      Distraction negative         Dermatomes:  upper and lower quarters intact to light touch     Myotomes:    5-/5 Bilateral C7 and T1  Otherwise upper and lower quarter 5/5      strength:  Right=  46 lbs    Left=  40 lbs     Joint Mobility:    Cervical:  some mid but mostly lower cervical, CTJ, and upper thoracic restrictions  Thoracic motion/mobility, scap-thoracic, ribs:  gross stiffness in thoracic region     Hip " range of motion/mobility:  Pt positioned in sidelying with hip extension mobility/flexibility R= -8 was -15, L= neutral was -3.                                                       Left                              R  Hip External Rotation:             5-/5                             5-/5  Hip abduction:                         4-/5                              4-/5 was 3+/5  Hip extension:                         3+/5                             4-/5     Flexibility:  Decreased flexibility noted Right>Left rectus femoris, vastus lateralis, tensor fascia latae, Iliotibial Band.  Right Quadratus Lumborum, long thoracolumbar paravertebral muscles.  Pecs, suboccipitals.    Treatment     Glendy received the treatments listed below:      manual therapy techniques: Joint mobilizations, Myofacial release, and Soft tissue Mobilization were applied to the: neck and low back for 30 minutes, including:    STM trapezius, levator scap, rhomboids bilaterally  Suboccipital release, STM along cervical paraspinals  Education on self treatment with tennis balls in a sock for suboccipital release    -Supine and sidelying trigger point and IASTM release of Right>Left rectus femoris, vastus lateralis, tensor fascia latae, Iliotibial Band with intermittent stretching  -Supine muscle energy technique correction of Right sided pelvic downslip with AI f/b shotgun technique  -sidelying opening technique to Right Quadratus Lumborum and longer paravertebral muscles with trigger point release  -rib area of greatest restriction (step forward on Left side, back on Right side) started on Left and then progressed to Right  - thoracic Sidebending   -supine mobilization of lower cervical, side glides and down glides, CTJ stretching/mobilization/MET, upper thoracic regions as well  -prone CTJ and upper T-spine P-A mobs (mixed some AROM neck extension to assess mobility)    NP--STM along B UT cervical paraspinals, suboccipital release  NP-- 1st rib  "mobilization (gentle) bilaterally       therapeutic exercises to develop strength, endurance, ROM, flexibility, posture, and core stabilization for 15 minutes including:  (NP) Self IASTM release to Right>Left thigh  Self muscle energy technique (Push/pull without stick) correction of Right sided pelvic downslip with AI  Sidelying Quadratus Lumborum + lat stretch with opening technique x 2'  +Supine cervical nod with tennis balls at suboccipitals 10 x 5"  Supine cervical chin tuck + neck flexion hold (for deep neck flexor strength) 10 x 5-10s  (extremely challenging)      Supine on towel  Hands behind head pec stretch + chin tuck (fingers assist with subocc release)  Thoracic extension + pec stretch with mini snow ayla     neuromuscular re-education activities to improve: Coordination, Kinesthetic, Proprioception, Posture, and Motor Control for 00 minutes. The following activities were included:  Cervical:  - Cat cow x 20  - thread the needle quadruped bilateral x 15  - prone chin tuck with retraction (prone on elbows) x 10 3s hold  - cervical isometrics SB,, rotation, flexion, extension bilaterally 10x each with 3 s hold - cues for neutral cervical posture  NP-- Row YTB 2 x 10    Lumbar: (NP today)  PPT 10 x 5", mod cues initially for proper activation  PPT + ball squeeze 10 x 5"  PPT + glut set into bridges 2 x 10  PPT +  clam with G loop x 20  PPT + clam with G loop into bridges     therapeutic activities to improve functional performance for 00 minutes, including:  Education on use of cervical roll along pillow edge for neck support  Anatomy and postural education on proper neck alignment at rest and with functional activities to avoid over straining and poor postural mechanics   May add at later date      Patient Education and Home Exercises       Education provided:   - avoidance of painful movements as able  - consistent work of stretching, exercises, and HEP  - increasing postural awareness throughout day " with tasks and activities     Written Home Exercises Provided: yes. Exercises were reviewed and Glendy was able to demonstrate them prior to the end of the session.  Glendy demonstrated good  understanding of the education provided. See Electronic Medical Record under Patient Instructions for exercises provided during therapy sessions    Assessment     Patient presents to clinic with continued tightness R > L upper trap and suboccipitals. Tightness and weakness limiting cranial flexion with cervical nods which did improve with tennis ball release but head lift with deep neck flexors remained very challenging. Weakness of postural stabilizers for the shoulders and neck continues but awareness and strength is slowly improving.     Glendy Is progressing well towards her goals.   Patient prognosis is Good.     Patient will continue to benefit from skilled outpatient physical therapy to address the deficits listed in the problem list box on initial evaluation, provide pt/family education and to maximize pt's level of independence in the home and community environment.     Patient's spiritual, cultural and educational needs considered and pt agreeable to plan of care and goals.     Anticipated Barriers for therapy: Schedule conflicts, transportation, pain, guarding, tolerance, endurance, posture, postural awareness, joint and soft tissue deficits, chronicity of neck and low back pain.    Goals:   Short-Term: 4 weeks (7/5/2024)      partially met, progressing  Pt will improve Active cervical flexion to > or = 40* to promote return to prior functional status.  Pt will improve Active cervical side bending to > or = 28* to promote return to prior functional status.  Pt will improve Active lumbar Sidebending  to > or = 12* to promote return to prior functional status.  Pt will demonstrate independence with self correction of pelvic alignment and when to perform to reduce pain and  to promote return to prior functional status.  Pt  will demonstrate independence with TOS and ULTT stretches and when to perform to reduce pain and  to promote return to prior functional status.  Pt will improve flexibility of Bilateral  quads, hip flexors, gluts, piriformis, pecs, periscapular, cervical and trunk muscles to help promoted better mobility, posture, alignment, and help return to prior functional status.  Pt will demonstrate improved postural habits as evidenced by improved pelvic alignment,  improved thoracic extension with less forward head posturing, good pelvic alignment with use of heel lift.  Pt will decrease pain levels < or = to 4-5/10 to hep promote appropriate progression of PT, HEP, and ADL's    Pt will be compliant with new home exercise program to assist with PT intervention and help allow appropriate progression through plan of care.     Long-Term: 12 weeks (8/30/2024)       ongoing  Pt will improve low back, thoracic mobility to WFL to allow return to normal activities of daily living.  Pt will improve cervical strength to > or = 5/5 to allow performance of activities of daily living.  Pt will improve pelvic, trunk and scap-thoracic strength/stabilization to > or = to 4/5 to allow better posture, alignment, and help return to prior functional status.  Pt will have > or = to good flexibility of quads, hip flexors, pecs, neck and periscapula muscles to help promoted better mobility, posture, alignment, and help return to prior functional status.  Pt will demonstrate good postural habits as evidenced by good overall posture and alignment to improve QOL and allow return to prior functional status.  Pt will report < or = 3/10 pain during activities of daily living to improve QOL and allow return to prior functional status.  Pt will be compliant and independent with HEP to allow and maintain better participation in normal activities  Pt will be able to resume normals ADL's, IADL's, previous activities, fitness, and work related tasks     Plan    Plan of care Certification: 6/4/2024 to 8/30/2024.    Continue with current POC toward established physical therapy goals.    Herbie Parisi, PT

## 2024-07-12 ENCOUNTER — TELEPHONE (OUTPATIENT)
Dept: HEMATOLOGY/ONCOLOGY | Facility: CLINIC | Age: 59
End: 2024-07-12
Payer: COMMERCIAL

## 2024-07-12 ENCOUNTER — CLINICAL SUPPORT (OUTPATIENT)
Dept: REHABILITATION | Facility: HOSPITAL | Age: 59
End: 2024-07-12
Payer: COMMERCIAL

## 2024-07-12 DIAGNOSIS — M25.69 BACK STIFFNESS: ICD-10-CM

## 2024-07-12 DIAGNOSIS — M62.89 QUADRICEP TIGHTNESS: ICD-10-CM

## 2024-07-12 DIAGNOSIS — M43.6 NECK STIFFNESS: ICD-10-CM

## 2024-07-12 DIAGNOSIS — R29.898 WEAKNESS OF BOTH HIPS: ICD-10-CM

## 2024-07-12 DIAGNOSIS — M54.2 NECK PAIN: Primary | ICD-10-CM

## 2024-07-12 DIAGNOSIS — M62.81 WEAKNESS OF TRUNK MUSCULATURE: ICD-10-CM

## 2024-07-12 DIAGNOSIS — M54.50 CHRONIC RIGHT-SIDED LOW BACK PAIN WITHOUT SCIATICA: ICD-10-CM

## 2024-07-12 DIAGNOSIS — G89.29 CHRONIC RIGHT-SIDED LOW BACK PAIN WITHOUT SCIATICA: ICD-10-CM

## 2024-07-12 PROCEDURE — 97110 THERAPEUTIC EXERCISES: CPT | Mod: PN

## 2024-07-12 PROCEDURE — 97140 MANUAL THERAPY 1/> REGIONS: CPT | Mod: PN

## 2024-07-16 ENCOUNTER — CLINICAL SUPPORT (OUTPATIENT)
Dept: REHABILITATION | Facility: HOSPITAL | Age: 59
End: 2024-07-16
Payer: COMMERCIAL

## 2024-07-16 DIAGNOSIS — M62.81 WEAKNESS OF TRUNK MUSCULATURE: ICD-10-CM

## 2024-07-16 DIAGNOSIS — R29.898 WEAKNESS OF BOTH HIPS: ICD-10-CM

## 2024-07-16 DIAGNOSIS — M43.6 NECK STIFFNESS: ICD-10-CM

## 2024-07-16 DIAGNOSIS — M25.69 BACK STIFFNESS: ICD-10-CM

## 2024-07-16 DIAGNOSIS — G89.29 CHRONIC RIGHT-SIDED LOW BACK PAIN WITHOUT SCIATICA: ICD-10-CM

## 2024-07-16 DIAGNOSIS — M54.2 NECK PAIN: Primary | ICD-10-CM

## 2024-07-16 DIAGNOSIS — M54.50 CHRONIC RIGHT-SIDED LOW BACK PAIN WITHOUT SCIATICA: ICD-10-CM

## 2024-07-16 DIAGNOSIS — M62.89 QUADRICEP TIGHTNESS: ICD-10-CM

## 2024-07-16 PROCEDURE — 97140 MANUAL THERAPY 1/> REGIONS: CPT | Mod: PN,CQ

## 2024-07-16 PROCEDURE — 97112 NEUROMUSCULAR REEDUCATION: CPT | Mod: PN,CQ

## 2024-07-16 PROCEDURE — 97110 THERAPEUTIC EXERCISES: CPT | Mod: PN,CQ

## 2024-07-16 NOTE — PROGRESS NOTES
[facility]   Physical Therapy Treatment Note      Name: Glendy Andrade  Clinic Number: [id number]    Therapy Diagnosis:   Encounter Diagnoses   Name Primary?    Neck pain Yes    Back stiffness     Neck stiffness     Chronic right-sided low back pain without sciatica     Weakness of trunk musculature     Weakness of both hips     Quadricep tightness      Physician: Ganesh Long MD    Visit Date: 7/16/2024    Physician Orders: PT Eval and Treat   Medical Diagnosis from Referral: Cervical spinal stenosis [M48.02], Lumbar disc herniation [M51.26]   Evaluation Date: 6/4/2024  Authorization Period Expiration: 12/31/2024  Plan of Care Expiration: 8/30/2024  Progress Note Due: 8/2/2024 (Last Progress Note 7/12/2024)  Visit # / Visits authorized: 7 / 20 +eval  FOTO: Issued Visit #: 1/3, (capture on visit 1, 5, 10) first on 6/4/2024     PTA Visit #: 1/5     Precautions: Standard, ADD, depression, history of MRSA infection, inflammatory spondylopathy, DISH (diffuse idiopathic skeletal hyperostosis), insomnia, possible diagnosis of psoriatic arthritis and autoimmune disorder,  MRI which shows cervical stenosis, possible cord signal change (possible need for decompression and fusion surgery).  right L4/5 protrusion in addition to canal stenosis at that level      Time In: 11:25  Time Out: 12:10  Total Appointment Time (timed & untimed codes): 45 min      Subjective     Patient reports: She got some acupuncture the other day and it helped so much.  She did well while out of town.  Got a steroid dose pack that helped.  R > L neck pain continuing into the head, occasional dull aching that goes up into the suboccipitals and causes dull headache.   Today mostly feeling a little HA, some soreness over her shoulders and scapulae, some increased tension in her neck.    She was compliant with home exercise program.  Response to previous treatment: muscle sore but then improved some  Functional change:  doing  "everything but just having less pain, not hesitating or thinking about it as much    Pain: 6/10 "tightness vs pain"  Location: rhomboids only today    Objective      Objective measures taken at reassessment unless otherwise specified    Treatment     Glendy received the treatments listed below:      manual therapy techniques: Joint mobilizations, Myofacial release, and Soft tissue Mobilization were applied to the: neck and low back for 15 minutes, including:    STM trapezius, levator scap, rhomboids bilaterally  Suboccipital release, STM along cervical paraspinals  Education on self treatment with tennis balls in a sock for suboccipital release  -supine mobilization of lower cervical, side glides and down glides  -prone CTJ and upper T-spine P-A mobs (mixed some AROM neck extension to assess mobility)    BELOW NOT PERFORMED  -Supine and sidelying trigger point and IASTM release of Right>Left rectus femoris, vastus lateralis, tensor fascia latae, Iliotibial Band with intermittent stretching  -Supine muscle energy technique correction of Right sided pelvic downslip with AI f/b shotgun technique  -sidelying opening technique to Right Quadratus Lumborum and longer paravertebral muscles with trigger point release  -rib area of greatest restriction (step forward on Left side, back on Right side) started on Left and then progressed to Right  - thoracic Sidebending     NP--STM along B UT cervical paraspinals, suboccipital release  NP-- 1st rib mobilization (gentle) bilaterally       therapeutic exercises to develop strength, endurance, ROM, flexibility, posture, and core stabilization for 15 minutes including:  (NP) Self IASTM release to Right>Left thigh  Self muscle energy technique (Push/pull without stick) correction of Right sided pelvic downslip with AI  Sidelying Quadratus Lumborum + lat stretch with opening technique x 2'  Supine cervical nod 10 x 5" - reviewed use of tennis balls for home  Supine cervical chin tuck + " "neck flexion hold (for deep neck flexor strength) 10 x 5-10s  (extremely challenging)      Supine on towel  Hands behind head pec stretch + chin tuck (fingers assist with subocc release) 5" hold x 10 reps  Thoracic extension + pec stretch with mini snow ayla     neuromuscular re-education activities to improve: Coordination, Kinesthetic, Proprioception, Posture, and Motor Control for 15 minutes. The following activities were included:  Cervical:  - Cat cow x 20  - thread the needle quadruped bilateral x 15  - prone chin tuck with retraction (prone on elbows) x 10 3s hold  NP-- cervical isometrics SB,, rotation, flexion, extension bilaterally 10x each with 3 s hold - cues for neutral cervical posture  NP-- Row YTB 2 x 10    Lumbar: (NP today)  PPT 10 x 5", mod cues initially for proper activation  PPT + ball squeeze 10 x 5"  PPT + glut set into bridges 2 x 10  PPT +  clam with G loop x 20  PPT + clam with G loop into bridges     therapeutic activities to improve functional performance for 00 minutes, including:  Education on use of cervical roll along pillow edge for neck support  Anatomy and postural education on proper neck alignment at rest and with functional activities to avoid over straining and poor postural mechanics   May add at later date      Patient Education and Home Exercises       Education provided:   - avoidance of painful movements as able  - consistent work of stretching, exercises, and HEP  - increasing postural awareness throughout day with tasks and activities     Written Home Exercises Provided: yes. Exercises were reviewed and Glendy was able to demonstrate them prior to the end of the session.  Glendy demonstrated good  understanding of the education provided. See Electronic Medical Record under Patient Instructions for exercises provided during therapy sessions    Assessment     Patient continues with tightness primarily along the suboccipitals and limited C1-2 flexion. Emphasis placed on " cervicothoracic region this date as patient reports low back symptoms have been improving. Challenged with deep neck flexor strength and stabilization exercises but is improving with overall strength and endurance. Muscle fatigue to periscapular musculature following prone and quadruped exercises.      Glendy Is progressing well towards her goals.   Patient prognosis is Good.     Patient will continue to benefit from skilled outpatient physical therapy to address the deficits listed in the problem list box on initial evaluation, provide pt/family education and to maximize pt's level of independence in the home and community environment.     Patient's spiritual, cultural and educational needs considered and pt agreeable to plan of care and goals.     Anticipated Barriers for therapy: Schedule conflicts, transportation, pain, guarding, tolerance, endurance, posture, postural awareness, joint and soft tissue deficits, chronicity of neck and low back pain.    Goals:   Short-Term: 4 weeks (7/5/2024)      partially met, progressing  Pt will improve Active cervical flexion to > or = 40* to promote return to prior functional status.  Pt will improve Active cervical side bending to > or = 28* to promote return to prior functional status.  Pt will improve Active lumbar Sidebending  to > or = 12* to promote return to prior functional status.  Pt will demonstrate independence with self correction of pelvic alignment and when to perform to reduce pain and  to promote return to prior functional status.  Pt will demonstrate independence with TOS and ULTT stretches and when to perform to reduce pain and  to promote return to prior functional status.  Pt will improve flexibility of Bilateral  quads, hip flexors, gluts, piriformis, pecs, periscapular, cervical and trunk muscles to help promoted better mobility, posture, alignment, and help return to prior functional status.  Pt will demonstrate improved postural habits as evidenced by  improved pelvic alignment,  improved thoracic extension with less forward head posturing, good pelvic alignment with use of heel lift.  Pt will decrease pain levels < or = to 4-5/10 to hep promote appropriate progression of PT, HEP, and ADL's    Pt will be compliant with new home exercise program to assist with PT intervention and help allow appropriate progression through plan of care.     Long-Term: 12 weeks (8/30/2024)       ongoing  Pt will improve low back, thoracic mobility to WFL to allow return to normal activities of daily living.  Pt will improve cervical strength to > or = 5/5 to allow performance of activities of daily living.  Pt will improve pelvic, trunk and scap-thoracic strength/stabilization to > or = to 4/5 to allow better posture, alignment, and help return to prior functional status.  Pt will have > or = to good flexibility of quads, hip flexors, pecs, neck and periscapula muscles to help promoted better mobility, posture, alignment, and help return to prior functional status.  Pt will demonstrate good postural habits as evidenced by good overall posture and alignment to improve QOL and allow return to prior functional status.  Pt will report < or = 3/10 pain during activities of daily living to improve QOL and allow return to prior functional status.  Pt will be compliant and independent with HEP to allow and maintain better participation in normal activities  Pt will be able to resume normals ADL's, IADL's, previous activities, fitness, and work related tasks     Plan   Plan of care Certification: 6/4/2024 to 8/30/2024.    Continue with current POC toward established physical therapy goals.    Aissatou Hodgson, PTA

## 2024-07-18 ENCOUNTER — TELEPHONE (OUTPATIENT)
Dept: HEMATOLOGY/ONCOLOGY | Facility: CLINIC | Age: 59
End: 2024-07-18
Payer: COMMERCIAL

## 2024-07-18 ENCOUNTER — OFFICE VISIT (OUTPATIENT)
Dept: NEUROSURGERY | Facility: CLINIC | Age: 59
End: 2024-07-18
Payer: COMMERCIAL

## 2024-07-18 VITALS
WEIGHT: 123.88 LBS | DIASTOLIC BLOOD PRESSURE: 87 MMHG | BODY MASS INDEX: 21.95 KG/M2 | RESPIRATION RATE: 18 BRPM | HEIGHT: 63 IN | HEART RATE: 70 BPM | SYSTOLIC BLOOD PRESSURE: 118 MMHG

## 2024-07-18 DIAGNOSIS — M48.02 CERVICAL SPINAL STENOSIS: Primary | ICD-10-CM

## 2024-07-18 PROCEDURE — 3079F DIAST BP 80-89 MM HG: CPT | Mod: CPTII,S$GLB,, | Performed by: STUDENT IN AN ORGANIZED HEALTH CARE EDUCATION/TRAINING PROGRAM

## 2024-07-18 PROCEDURE — 3074F SYST BP LT 130 MM HG: CPT | Mod: CPTII,S$GLB,, | Performed by: STUDENT IN AN ORGANIZED HEALTH CARE EDUCATION/TRAINING PROGRAM

## 2024-07-18 PROCEDURE — 99213 OFFICE O/P EST LOW 20 MIN: CPT | Mod: S$GLB,,, | Performed by: STUDENT IN AN ORGANIZED HEALTH CARE EDUCATION/TRAINING PROGRAM

## 2024-07-18 PROCEDURE — 1159F MED LIST DOCD IN RCRD: CPT | Mod: CPTII,S$GLB,, | Performed by: STUDENT IN AN ORGANIZED HEALTH CARE EDUCATION/TRAINING PROGRAM

## 2024-07-18 PROCEDURE — 3008F BODY MASS INDEX DOCD: CPT | Mod: CPTII,S$GLB,, | Performed by: STUDENT IN AN ORGANIZED HEALTH CARE EDUCATION/TRAINING PROGRAM

## 2024-07-18 NOTE — PROGRESS NOTES
[facility] - [place]  Clinic Consult     Consult Requested By: No ref. provider found  PCP: Jillian Rosen MD    SUBJECTIVE:     Chief Complaint:   No chief complaint on file.  Returns today  She went on vacation she is actually feeling much better  She has worked with physical therapy dry needling has worked very well for her  She denies any current symptoms  She is showing without issue denies any dexterity changes      History of Present Illness:  lGendy Andrade is a 58 y.o. female with HLD and DISH who presents for spinal evaluation. Patient reports diagnosis of DISH approximately 5 years ago. She has managed her neck and back pain conservatively. She reports worsening of neck pain with radiation into the arms to the hands. She reports numbness/tingling. She denies dropping objects or significant difficulties with hand dexterity. She also complains of low back pain with radiation into the right leg.     Pertinent and recent history, provider evaluations, imaging and data reviewed in EPIC        Past Medical History:   Diagnosis Date    ADD (attention deficit disorder)     Depression     DISH (diffuse idiopathic skeletal hyperostosis)     High risk HPV infection     History of MRSA infection     Hyperlipidemia     Inflammatory spondylopathy, unspecified spinal region 03/08/2023    Insomnia     Nephrolithiasis 09/29/2023    Records @ [facility]; CT abd/pelvis w/o contrast w/ 2 mm distal right ureteral stone w/o hydronephrosis; resolved w/o complication; cleared by Urology f/u outpatient    Personal history of colonic polyps 06/15/2023    PMB (postmenopausal bleeding)     PONV (postoperative nausea and vomiting)     Thyroid nodule      Past Surgical History:   Procedure Laterality Date    BREAST BIOPSY Left 2011    benign    HYSTEROSCOPIC POLYPECTOMY OF UTERUS N/A 5/24/2024    Procedure: POLYPECTOMY, UTERUS, HYSTEROSCOPIC;  Surgeon: Sharla Zavala MD;  Location: [facility];   Service: OB/GYN;  Laterality: N/A;  with myosure    HYSTEROSCOPY WITH DILATION AND CURETTAGE OF UTERUS N/A 2024    Procedure: HYSTEROSCOPY, WITH DILATION AND CURETTAGE OF UTERUS;  Surgeon: Sharla Zavala MD;  Location: [facility];  Service: OB/GYN;  Laterality: N/A;    THYROIDECTOMY      TONSILLECTOMY      TOTAL REDUCTION MAMMOPLASTY Bilateral     breast lift    tummy nelson       Family History   Problem Relation Name Age of Onset    Diabetes Paternal Grandmother      Arthritis Maternal Grandmother      Diabetes Maternal Grandmother      Hypertension Father      Breast cancer Neg Hx      Ovarian cancer Neg Hx      Uterine cancer Neg Hx      Colon cancer Neg Hx       Social History     Tobacco Use    Smoking status: Former     Current packs/day: 0.00     Types: Cigarettes     Quit date: 3/19/2016     Years since quittin.3    Smokeless tobacco: Never   Substance Use Topics    Alcohol use: Yes     Alcohol/week: 7.0 standard drinks of alcohol     Types: 7 Glasses of wine per week     Comment: occ    Drug use: No      Review of patient's allergies indicates:  No Known Allergies    Current Outpatient Medications:     baclofen (LIORESAL) 10 MG tablet, Take 1 tablet (10 mg total) by mouth 2 (two) times daily as needed (muscle spasms)., Disp: 90 tablet, Rfl: 1    cyanocobalamin (VITAMIN B-12) 1000 MCG tablet, Take 100 mcg by mouth once daily., Disp: , Rfl:     ibuprofen (ADVIL,MOTRIN) 800 MG tablet, Take 1 tablet (800 mg total) by mouth every 8 (eight) hours as needed for Pain., Disp: 30 tablet, Rfl: 0    multivitamin (THERAGRAN) per tablet, Take 1 tablet by mouth once daily., Disp: , Rfl:     ondansetron (ZOFRAN) 8 MG tablet, , Disp: , Rfl:     progesterone (PROMETRIUM) 200 MG capsule, Take 200 mg by mouth once daily. Compound (Patient not taking: Reported on 6/3/2024), Disp: , Rfl:     traMADoL (ULTRAM) 50 mg tablet, Take 1 tablet (50 mg total) by mouth 3 (three) times daily as needed for Pain., Disp: 15  "tablet, Rfl: 0    vitamin D (VITAMIN D3) 1000 units Tab, Take 1,000 Units by mouth once daily., Disp: , Rfl:     VYVANSE 30 mg capsule, Take 30 mg by mouth daily as needed., Disp: , Rfl:     Review of Systems:   Constitutional: no fever, chills or night sweats. No changes in weight   Eyes: no visual changes   ENT: no nasal congestion or sore throat   Respiratory: no cough or shortness of breath   Cardiovascular: no chest pain or palpitations   Gastrointestinal: no nausea or vomiting   Genitourinary: no hematuria or dysuria   Integument/Breast: no rash or pruritis   Hematologic/Lymphatic: no easy bruising or lymphadenopathy   Musculoskeletal: +neck pain, back pain  Neurological: no seizures or tremors +paresthesias   Behavioral/Psych: no auditory or visual hallucinations   Endocrine: no heat or cold intolerance         OBJECTIVE:     Vital Signs (Most Recent):     Estimated body mass index is 21.95 kg/m² as calculated from the following:    Height as of 7/9/24: 5' 3" (1.6 m).    Weight as of 7/9/24: 56.2 kg (123 lb 14.4 oz).    Physical Exam:   General: well developed, well nourished, no distress   Neurologic: Alert and oriented. Thought content appropriate. GCS 15.   Head: normocephalic, atraumatic  Eyes: EOMI  Neck: trachea midline, no JVD   Cardiovascular: no LE edema  Pulmonary: normal respirations, no signs of respiratory distress  Abdomen: non-distended  Sensory: intact to light touch throughout  Skin: Skin is warm, dry and intact    Motor Strength: Moves all extremities spontaneously with good tone. No abnormal movements seen.       Deltoids Triceps Biceps Wrist Extension Wrist Flexion Hand  Interossei   Upper: R 5/5 5/5 5/5 5/5 5/5 5/5 5-/5    L 5/5 5/5 5/5 5/5 5/5 5/5 5-/5     Iliopsoas Quadriceps Knee  Flexion Tibialis  anterior Gastro- cnemius EHL    Lower: R 5/5 5/5 5/5 5/5 5/5 5/5     L 5/5 5/5 5/5 5/5 5/5 5/5      DTR's: 2 +   Burton: absent  Clonus: absent    Gait: normal            Diagnostic " Results:  I have independently reviewed the following imaging:  MRI cervical/thoracic/lumbar  ALIGNMENT: Straightening of the normal cervical lordosis.  No spondylolisthesis.     BONE: No compression fractures.  Multiple fat containing lesions, the largest at L3, compatible with hemangiomas.  No aggressive marrow replacing lesion.     JOINT: Multilevel degenerative changes with disc desiccation, disc height loss, and facet arthropathy.  There are large anterior osteophytes extending from C3-7.  No ankylosis or erosions.  No bone marrow edema.     SPINAL CANAL: Subtle T2 hyperintense signal in the cervical cord at the C6-7 level.  The thoracic spinal cord is unremarkable.  The conus medullaris has a normal appearance and terminates at the L1 level.  Cauda equina nerve roots are unremarkable.  No mass or collection.     POSTERIOR FOSSA: Unremarkable.     PARASPINAL SOFT TISSUES: Unremarkable.     SIGNIFICANT FINDINGS BY LEVEL:     C2-3: Unremarkable.     C3-4: Small disc osteophyte complex.  No canal stenosis.  Mild left foraminal stenosis.     C4-5: Disc osteophyte complex, eccentric to the right.  Mild canal stenosis.  Severe right and moderate left foraminal stenosis.     C5-6: Disc osteophyte complex.  Mild canal stenosis.  Moderate bilateral foraminal stenosis.     C6-7: Disc osteophyte complex.  Moderate canal stenosis with near complete effacement of the thecal sac.  Severe bilateral foraminal stenosis.     C7-T1: Disc osteophyte complex.  Mild canal stenosis.  Mild bilateral foraminal stenosis.     Thoracic spine: No focal disc abnormality.  Facet arthropathy resulting in mild canal stenosis at T10-11.  No significant foraminal stenosis.     T12-L1: Right facet arthropathy and ligamentum flavum thickening.  Mild canal stenosis.  Mild right foraminal stenosis.     L1-2: Unremarkable.     L2-3: Disc bulge.  No canal stenosis.  Mild bilateral foraminal stenosis.     L3-4: Disc bulge.  Bilateral facet arthropathy  and ligamentum flavum thickening.  Mild canal stenosis.  Mild bilateral foraminal stenosis.     L4-5: Disc bulge.  Superimposed right paracentral extrusion with caudal migration to the L5 pedicle level.  Advanced facet arthropathy and ligamentum flavum thickening bilaterally.  Moderate canal stenosis.  Herniated disc abuts the right descending L5 and S1 nerve roots.  There is abutment of the left descending L5 nerve root as well in the lateral recess.  Severe bilateral foraminal stenosis.     L5-S1: Disc bulge.  Superimposed central protrusion.  Mild canal stenosis.  Herniated disc abuts the descending S1 nerve roots bilaterally.  Mild left foraminal stenosis.           ASSESSMENT/PLAN:     There are no diagnoses linked to this encounter.    Glendy Andrade is a 58 y.o. female  She has been worked up for which she considers a history of psoriatic arthritis.  She has had joint pains diffusely some rashes around her joints  Working with rheumatoid arthritis she is further workup for an autoimmune disorder and panel scheduled she states following up in July.    She does have some lower back pain and radiculopathy down the right leg  Which we discussed treatment options for an appropriate referrals    She works as a seamstress.  And has part of this workup she obtained a cervical MRI which shows known multilevel cervical degeneration with stenosis more so at C3-6 but at C C6-7 level there is a question of some cord signal change  She has an old x-ray with somewhat of a cervical kyphosis multilevel degeneration  Despite all this.  She has very few signs or symptoms of cervical myelopathy.  She has a seamstress she works without an issue.  She denies any new numbness tingling in the extremities.  No new loss of function etc.  Reviewed the complexes the situation with the radiographic findings without significant clinical consequences  In this setting surgery is essentially prophylactic with the risk reviewed in detail  She  has significant anterior osteophytes and surgery require a posterior cervical decompression instrumented fusion  In the setting of multilevel degeneration stenosis and kyphosis  With all that.  She will obtain a DEXA scan due to question of demineralization on previous x-rays and CT scan  Physical therapy , new x-rays  And will follow up in 8-12 weeks  Any signs and symptoms of progression she has been thoroughly educated we will follow up sooner        Assessment recommendation.    Overall she is doing very well  She has no functional limitations no signs or symptoms of myelopathy  Complex cervical imaging with significant component of additional in the anterior cervical spinal column  Stenosis is reviewed  As well as treatment options  Doing very well we will continue with surveillance and six-month clinical follow up    With regard to her lower back she is managing well with activity modification physical therapy would like to continue dry needling  We did discuss should she have persistent symptoms the possibility of a consultation with a nonoperative team at interventional spine/pain management  She will continue to manage conservatively at this time  Signs symptoms needing re-evaluation reviewed in detail                        Patient verbalized understanding of plan. Encouraged to call with any questions or concerns.     This note was partially dictated using voice recognition software, so please excuse any errors that were not corrected.

## 2024-07-19 ENCOUNTER — PATIENT MESSAGE (OUTPATIENT)
Dept: OBSTETRICS AND GYNECOLOGY | Facility: CLINIC | Age: 59
End: 2024-07-19
Payer: COMMERCIAL

## 2024-07-19 ENCOUNTER — CLINICAL SUPPORT (OUTPATIENT)
Dept: REHABILITATION | Facility: HOSPITAL | Age: 59
End: 2024-07-19
Payer: COMMERCIAL

## 2024-07-19 DIAGNOSIS — Z79.890 HORMONE REPLACEMENT THERAPY (HRT): Primary | ICD-10-CM

## 2024-07-19 DIAGNOSIS — M54.2 NECK PAIN: Primary | ICD-10-CM

## 2024-07-19 DIAGNOSIS — M43.6 NECK STIFFNESS: ICD-10-CM

## 2024-07-19 DIAGNOSIS — M25.69 BACK STIFFNESS: ICD-10-CM

## 2024-07-19 DIAGNOSIS — M62.81 WEAKNESS OF TRUNK MUSCULATURE: ICD-10-CM

## 2024-07-19 DIAGNOSIS — G89.29 CHRONIC RIGHT-SIDED LOW BACK PAIN WITHOUT SCIATICA: ICD-10-CM

## 2024-07-19 DIAGNOSIS — M62.89 QUADRICEP TIGHTNESS: ICD-10-CM

## 2024-07-19 DIAGNOSIS — R29.898 WEAKNESS OF BOTH HIPS: ICD-10-CM

## 2024-07-19 DIAGNOSIS — M54.50 CHRONIC RIGHT-SIDED LOW BACK PAIN WITHOUT SCIATICA: ICD-10-CM

## 2024-07-19 PROCEDURE — 97140 MANUAL THERAPY 1/> REGIONS: CPT | Mod: PN,CQ

## 2024-07-19 PROCEDURE — 97112 NEUROMUSCULAR REEDUCATION: CPT | Mod: PN,CQ

## 2024-07-19 NOTE — PROGRESS NOTES
[facility]   Physical Therapy Treatment Note      Name: Glendy Andrade  Clinic Number: [id number]    Therapy Diagnosis:   Encounter Diagnoses   Name Primary?    Neck pain Yes    Back stiffness     Neck stiffness     Chronic right-sided low back pain without sciatica     Weakness of trunk musculature     Weakness of both hips     Quadricep tightness      Physician: Ganesh Long MD    Visit Date: 7/19/2024    Physician Orders: PT Eval and Treat   Medical Diagnosis from Referral: Cervical spinal stenosis [M48.02], Lumbar disc herniation [M51.26]   Evaluation Date: 6/4/2024  Authorization Period Expiration: 12/31/2024  Plan of Care Expiration: 8/30/2024  Progress Note Due: 8/2/2024 (Last Progress Note 7/12/2024)  Visit # / Visits authorized: 8 / 20 +eval  FOTO: Issued Visit #: 1/3, (capture on visit 1, 5, 10) first on 6/4/2024     PTA Visit #: 1/5     Precautions: Standard, ADD, depression, history of MRSA infection, inflammatory spondylopathy, DISH (diffuse idiopathic skeletal hyperostosis), insomnia, possible diagnosis of psoriatic arthritis and autoimmune disorder,  MRI which shows cervical stenosis, possible cord signal change (possible need for decompression and fusion surgery).  right L4/5 protrusion in addition to canal stenosis at that level      Time In: 1000   Time Out: 1050   Total Time: 50 minutes  Total Billable Time: 25 minutes      Subjective     Patient reports: 4/10 stiffness bilateral post neck. Got to 7-8/10 stiffness right>left paraspinal. Stared getting a headache at bilateral occiput this morning.     She was limited compliant with home exercise program.  Response to previous treatment: muscle sore but then improved some  Functional change:  doing everything but just having less pain, not hesitating or thinking about it as much    Pain: 4/10 stiffness  Location: occiput, cerv paraspinals, UT, levator scap    Objective      Objective measures taken at reassessment  "unless otherwise specified    Treatment     Glendy received the treatments listed below:      manual therapy techniques: Joint mobilizations, Myofacial release, and Soft tissue Mobilization were applied to the: neck and low back for 10 minutes, including:    STM trapezius, levator scap, rhomboids bilaterally  Suboccipital release, STM along cervical paraspinals  Education on self treatment with tennis balls in a sock for suboccipital release  NP--supine mobilization of lower cervical, side glides and down glides  -NP-prone CTJ and upper T-spine P-A mobs (mixed some AROM neck extension to assess mobility)    BELOW NOT PERFORMED  -Supine and sidelying trigger point and IASTM release of Right>Left rectus femoris, vastus lateralis, tensor fascia latae, Iliotibial Band with intermittent stretching  -Supine muscle energy technique correction of Right sided pelvic downslip with AI f/b shotgun technique  -sidelying opening technique to Right Quadratus Lumborum and longer paravertebral muscles with trigger point release  -rib area of greatest restriction (step forward on Left side, back on Right side) started on Left and then progressed to Right  - thoracic Sidebending     NP--STM along B UT cervical paraspinals, suboccipital release  NP-- 1st rib mobilization (gentle) bilaterally       therapeutic exercises to develop strength, endurance, ROM, flexibility, posture, and core stabilization for 5 minutes including:  (NP) Self IASTM release to Right>Left thigh  Self muscle energy technique (Push/pull without stick) correction of Right sided pelvic downslip with AI  Sidelying Quadratus Lumborum + lat stretch with opening technique x 2'  Supine cervical nod 10 x 5" - reviewed use of tennis balls for home  Supine cervical chin tuck + neck flexion hold (for deep neck flexor strength) 10 x 5-10s  (extremely challenging)      Supine on towel  Hands behind head pec stretch + chin tuck (fingers assist with subocc release) 5" hold x 10 " "reps  Thoracic extension + pec stretch with mini snow ayla x 20    neuromuscular re-education activities to improve: Coordination, Kinesthetic, Proprioception, Posture, and Motor Control for 10 minutes. The following activities were included:  Cervical:  - Cat cow x 20  - thread the needle quadruped bilateral x 15  - prone chin tuck with retraction (prone on elbows) x 10 3s hold  NP-- cervical isometrics SB,, rotation, flexion, extension bilaterally 10x each with 3 s hold - cues for neutral cervical posture  NP-- Row YTB 2 x 10    Lumbar:   PPT 10 x 5", mod cues initially for proper activation  PPT + ball squeeze 10 x 5"  PPT + glut set into bridges 2 x 10  PPT +  clam with G loop x 20  PPT + clam with G loop into bridges     therapeutic activities to improve functional performance for 00 minutes, including:  Education on use of cervical roll along pillow edge for neck support  Anatomy and postural education on proper neck alignment at rest and with functional activities to avoid over straining and poor postural mechanics   May add at later date      Patient Education and Home Exercises       Education provided:   - avoidance of painful movements as able  - consistent work of stretching, exercises, and HEP  - increasing postural awareness throughout day with tasks and activities     Written Home Exercises Provided:  Instructed patient to continue current home exercise program.  Exercises were reviewed and Glendy was able to demonstrate them prior to the end of the session.  Glendy demonstrated good  understanding of the education provided. See Electronic Medical Record under Patient Instructions for exercises provided during therapy sessions    Assessment     Improving stiffness level reported, which she statis is trending lower lately; however, she does continue to have increases up to 7-8/10 the next day after last session in the evening after sewing, sometimes seated but mostly standing. Reviewed importance of " daily home exercise program compliance to include glute strengthening and pt verbalized understanding. Continues to be challenged with deep neck flexor strength and stabilization exercises but is improving with overall strength and endurance. Improved stiffness after session and headache resolved.  Will benefit from continued physical therapy intervention to progress toward goals set forth in plan of care to improve functional mobility and quality of life.     Glendy Is progressing well towards her goals.   Patient prognosis is Good.     Patient will continue to benefit from skilled outpatient physical therapy to address the deficits listed in the problem list box on initial evaluation, provide pt/family education and to maximize pt's level of independence in the home and community environment.     Patient's spiritual, cultural and educational needs considered and pt agreeable to plan of care and goals.     Anticipated Barriers for therapy: Schedule conflicts, transportation, pain, guarding, tolerance, endurance, posture, postural awareness, joint and soft tissue deficits, chronicity of neck and low back pain.    Goals:   Short-Term: 4 weeks (7/5/2024)      partially met, progressing  Pt will improve Active cervical flexion to > or = 40* to promote return to prior functional status.  Pt will improve Active cervical side bending to > or = 28* to promote return to prior functional status.  Pt will improve Active lumbar Sidebending  to > or = 12* to promote return to prior functional status.  Pt will demonstrate independence with self correction of pelvic alignment and when to perform to reduce pain and  to promote return to prior functional status.  Pt will demonstrate independence with TOS and ULTT stretches and when to perform to reduce pain and  to promote return to prior functional status.  Pt will improve flexibility of Bilateral  quads, hip flexors, gluts, piriformis, pecs, periscapular, cervical and trunk  muscles to help promoted better mobility, posture, alignment, and help return to prior functional status.  Pt will demonstrate improved postural habits as evidenced by improved pelvic alignment,  improved thoracic extension with less forward head posturing, good pelvic alignment with use of heel lift.  Pt will decrease pain levels < or = to 4-5/10 to hep promote appropriate progression of PT, HEP, and ADL's    Pt will be compliant with new home exercise program to assist with PT intervention and help allow appropriate progression through plan of care.     Long-Term: 12 weeks (8/30/2024)       ongoing  Pt will improve low back, thoracic mobility to WFL to allow return to normal activities of daily living.  Pt will improve cervical strength to > or = 5/5 to allow performance of activities of daily living.  Pt will improve pelvic, trunk and scap-thoracic strength/stabilization to > or = to 4/5 to allow better posture, alignment, and help return to prior functional status.  Pt will have > or = to good flexibility of quads, hip flexors, pecs, neck and periscapula muscles to help promoted better mobility, posture, alignment, and help return to prior functional status.  Pt will demonstrate good postural habits as evidenced by good overall posture and alignment to improve QOL and allow return to prior functional status.  Pt will report < or = 3/10 pain during activities of daily living to improve QOL and allow return to prior functional status.  Pt will be compliant and independent with HEP to allow and maintain better participation in normal activities  Pt will be able to resume normals ADL's, IADL's, previous activities, fitness, and work related tasks     Plan   Continue per POC, progressing as appropriate to achieve stated goals.    Continue with: Plan of care Certification: 6/4/2024 to 8/30/2024.    Continue with current POC toward established physical therapy goals.    Rachel Geiger, PTA

## 2024-07-19 NOTE — PROGRESS NOTES
"[facility]   Physical Therapy Treatment Note      Name: Glendy Andrade  Clinic Number: [id number]    Therapy Diagnosis:   No diagnosis found.    Physician: Ganesh Long MD    Visit Date: 7/19/2024    Physician Orders: PT Eval and Treat   Medical Diagnosis from Referral: Cervical spinal stenosis [M48.02], Lumbar disc herniation [M51.26]   Evaluation Date: 6/4/2024  Authorization Period Expiration: 12/31/2024  Plan of Care Expiration: 8/30/2024  Progress Note Due: 8/2/2024 (Last Progress Note 7/12/2024)  Visit # / Visits authorized: 8 / 20 +eval  FOTO: Issued Visit #: 1/3, (capture on visit 1, 5, 10) first on 6/4/2024     PTA Visit #: 0/5     Precautions: Standard, ADD, depression, history of MRSA infection, inflammatory spondylopathy, DISH (diffuse idiopathic skeletal hyperostosis), insomnia, possible diagnosis of psoriatic arthritis and autoimmune disorder,  MRI which shows cervical stenosis, possible cord signal change (possible need for decompression and fusion surgery).  right L4/5 protrusion in addition to canal stenosis at that level      Time In: 11:15  Time Out: 12:00  Total Appointment Time (timed & untimed codes): ***45 min      Subjective     Patient reports: She got some acupuncture the other day and it helped so much.  She did well while out of town.  Got a steroid dose pack that helped.  R > L neck pain continuing into the head, occasional dull aching that goes up into the suboccipitals and causes dull headache.   Today mostly feeling a little HA, some soreness over her shoulders and scapulae, some increased tension in her neck.    She was compliant with home exercise program.  Response to previous treatment: ***muscle sore but then improved some  Functional change:  ***doing everything but just having less pain, not hesitating or thinking about it as much    Pain: ***6/10 "tightness vs pain"  Location: rhomboids only today    Objective      Objective measures taken at " "reassessment unless otherwise specified    Treatment     Glendy received the treatments listed below:      manual therapy techniques: Joint mobilizations, Myofacial release, and Soft tissue Mobilization were applied to the: neck and low back for ***15 minutes, including:    STM trapezius, levator scap, rhomboids bilaterally  Suboccipital release, STM along cervical paraspinals  Education on self treatment with tennis balls in a sock for suboccipital release  -supine mobilization of lower cervical, side glides and down glides  -prone CTJ and upper T-spine P-A mobs (mixed some AROM neck extension to assess mobility)    ***BELOW NOT PERFORMED  -Supine and sidelying trigger point and IASTM release of Right>Left rectus femoris, vastus lateralis, tensor fascia latae, Iliotibial Band with intermittent stretching  -Supine muscle energy technique correction of Right sided pelvic downslip with AI f/b shotgun technique  -sidelying opening technique to Right Quadratus Lumborum and longer paravertebral muscles with trigger point release  -rib area of greatest restriction (step forward on Left side, back on Right side) started on Left and then progressed to Right  - thoracic Sidebending     NP--STM along B UT cervical paraspinals, suboccipital release  NP-- 1st rib mobilization (gentle) bilaterally       therapeutic exercises to develop strength, endurance, ROM, flexibility, posture, and core stabilization for ***15 minutes including:  (NP) Self IASTM release to Right>Left thigh  Self muscle energy technique (Push/pull without stick) correction of Right sided pelvic downslip with AI  Sidelying Quadratus Lumborum + lat stretch with opening technique x 2'  Supine cervical nod 10 x 5" - reviewed use of tennis balls for home  Supine cervical chin tuck + neck flexion hold (for deep neck flexor strength) 10 x 5-10s  (extremely challenging)      Supine on towel  Hands behind head pec stretch + chin tuck (fingers assist with subocc " "release) 5" hold x 10 reps  Thoracic extension + pec stretch with mini snow ayla     neuromuscular re-education activities to improve: Coordination, Kinesthetic, Proprioception, Posture, and Motor Control for ***15 minutes. The following activities were included:  Cervical:  - Cat cow x 20  - thread the needle quadruped bilateral x 15  - prone chin tuck with retraction (prone on elbows) x 10 3s hold  NP-- cervical isometrics SB,, rotation, flexion, extension bilaterally 10x each with 3 s hold - cues for neutral cervical posture  NP-- Row YTB 2 x 10    Lumbar: (NP today)  PPT 10 x 5", mod cues initially for proper activation  PPT + ball squeeze 10 x 5"  PPT + glut set into bridges 2 x 10  PPT +  clam with G loop x 20  PPT + clam with G loop into bridges     therapeutic activities to improve functional performance for 00 minutes, including:  Education on use of cervical roll along pillow edge for neck support  Anatomy and postural education on proper neck alignment at rest and with functional activities to avoid over straining and poor postural mechanics   May add at later date      Patient Education and Home Exercises       Education provided:   - avoidance of painful movements as able  - consistent work of stretching, exercises, and HEP  - increasing postural awareness throughout day with tasks and activities     Written Home Exercises Provided: yes. Exercises were reviewed and Glendy was able to demonstrate them prior to the end of the session.  Glendy demonstrated good  understanding of the education provided. See Electronic Medical Record under Patient Instructions for exercises provided during therapy sessions    Assessment   ***  Patient continues with tightness primarily along the suboccipitals and limited C1-2 flexion. Emphasis placed on cervicothoracic region this date as patient reports low back symptoms have been improving. Challenged with deep neck flexor strength and stabilization exercises but is " improving with overall strength and endurance. Muscle fatigue to periscapular musculature following prone and quadruped exercises.      Glendy Is progressing well towards her goals.   Patient prognosis is Good.     Patient will continue to benefit from skilled outpatient physical therapy to address the deficits listed in the problem list box on initial evaluation, provide pt/family education and to maximize pt's level of independence in the home and community environment.     Patient's spiritual, cultural and educational needs considered and pt agreeable to plan of care and goals.     Anticipated Barriers for therapy: Schedule conflicts, transportation, pain, guarding, tolerance, endurance, posture, postural awareness, joint and soft tissue deficits, chronicity of neck and low back pain.    Goals:   Short-Term: 4 weeks (7/5/2024)      ***partially met, progressing  Pt will improve Active cervical flexion to > or = 40* to promote return to prior functional status.  Pt will improve Active cervical side bending to > or = 28* to promote return to prior functional status.  Pt will improve Active lumbar Sidebending  to > or = 12* to promote return to prior functional status.  Pt will demonstrate independence with self correction of pelvic alignment and when to perform to reduce pain and  to promote return to prior functional status.  Pt will demonstrate independence with TOS and ULTT stretches and when to perform to reduce pain and  to promote return to prior functional status.  Pt will improve flexibility of Bilateral  quads, hip flexors, gluts, piriformis, pecs, periscapular, cervical and trunk muscles to help promoted better mobility, posture, alignment, and help return to prior functional status.  Pt will demonstrate improved postural habits as evidenced by improved pelvic alignment,  improved thoracic extension with less forward head posturing, good pelvic alignment with use of heel lift.  Pt will decrease pain levels  < or = to 4-5/10 to hep promote appropriate progression of PT, HEP, and ADL's    Pt will be compliant with new home exercise program to assist with PT intervention and help allow appropriate progression through plan of care.     Long-Term: 12 weeks (8/30/2024)       ongoing  Pt will improve low back, thoracic mobility to WFL to allow return to normal activities of daily living.  Pt will improve cervical strength to > or = 5/5 to allow performance of activities of daily living.  Pt will improve pelvic, trunk and scap-thoracic strength/stabilization to > or = to 4/5 to allow better posture, alignment, and help return to prior functional status.  Pt will have > or = to good flexibility of quads, hip flexors, pecs, neck and periscapula muscles to help promoted better mobility, posture, alignment, and help return to prior functional status.  Pt will demonstrate good postural habits as evidenced by good overall posture and alignment to improve QOL and allow return to prior functional status.  Pt will report < or = 3/10 pain during activities of daily living to improve QOL and allow return to prior functional status.  Pt will be compliant and independent with HEP to allow and maintain better participation in normal activities  Pt will be able to resume normals ADL's, IADL's, previous activities, fitness, and work related tasks     Plan   Plan of care Certification: 6/4/2024 to 8/30/2024.    Continue with current POC toward established physical therapy goals.    Herbie Parisi, PT

## 2024-07-22 RX ORDER — ESTRADIOL 0.05 MG/D
1 FILM, EXTENDED RELEASE TRANSDERMAL
Qty: 8 PATCH | Refills: 11 | Status: SHIPPED | OUTPATIENT
Start: 2024-07-22 | End: 2025-07-22

## 2024-07-22 RX ORDER — PROGESTERONE 100 MG/1
100 CAPSULE ORAL NIGHTLY
Qty: 12 CAPSULE | Refills: 11 | Status: SHIPPED | OUTPATIENT
Start: 2024-07-22 | End: 2025-07-22

## 2024-07-30 ENCOUNTER — CLINICAL SUPPORT (OUTPATIENT)
Dept: REHABILITATION | Facility: HOSPITAL | Age: 59
End: 2024-07-30
Payer: COMMERCIAL

## 2024-07-30 DIAGNOSIS — M43.6 NECK STIFFNESS: ICD-10-CM

## 2024-07-30 DIAGNOSIS — M25.69 BACK STIFFNESS: ICD-10-CM

## 2024-07-30 DIAGNOSIS — G89.29 CHRONIC RIGHT-SIDED LOW BACK PAIN WITHOUT SCIATICA: ICD-10-CM

## 2024-07-30 DIAGNOSIS — R29.898 WEAKNESS OF BOTH HIPS: ICD-10-CM

## 2024-07-30 DIAGNOSIS — M54.50 CHRONIC RIGHT-SIDED LOW BACK PAIN WITHOUT SCIATICA: ICD-10-CM

## 2024-07-30 DIAGNOSIS — M54.2 NECK PAIN: Primary | ICD-10-CM

## 2024-07-30 DIAGNOSIS — M62.89 QUADRICEP TIGHTNESS: ICD-10-CM

## 2024-07-30 DIAGNOSIS — M62.81 WEAKNESS OF TRUNK MUSCULATURE: ICD-10-CM

## 2024-07-30 PROCEDURE — 97110 THERAPEUTIC EXERCISES: CPT | Mod: PN

## 2024-07-30 PROCEDURE — 97140 MANUAL THERAPY 1/> REGIONS: CPT | Mod: PN

## 2024-07-30 NOTE — PROGRESS NOTES
[facility]   Physical Therapy Treatment Note      Name: Glendy Andrade  Clinic Number: [id number]    Therapy Diagnosis:   Encounter Diagnoses   Name Primary?    Neck pain Yes    Back stiffness     Neck stiffness     Chronic right-sided low back pain without sciatica     Weakness of trunk musculature     Weakness of both hips     Quadricep tightness        Physician: Ganesh Long MD    Visit Date: 7/30/2024    Physician Orders: PT Eval and Treat   Medical Diagnosis from Referral: Cervical spinal stenosis [M48.02], Lumbar disc herniation [M51.26]   Evaluation Date: 6/4/2024  Authorization Period Expiration: 12/31/2024  Plan of Care Expiration: 8/30/2024  Progress Note Due: 8/2/2024 (Last Progress Note 7/12/2024)  Visit # / Visits authorized: 9 / 20 +eval  FOTO: Issued Visit #: 1/3, (capture on visit 1, 5, 10) first on 6/4/2024     PTA Visit #: 0/5     Precautions: Standard, ADD, depression, history of MRSA infection, inflammatory spondylopathy, DISH (diffuse idiopathic skeletal hyperostosis), insomnia, possible diagnosis of psoriatic arthritis and autoimmune disorder,  MRI which shows cervical stenosis, possible cord signal change (possible need for decompression and fusion surgery).  right L4/5 protrusion in addition to canal stenosis at that level      Time In: 11:15   Time Out: 12:05  Total Time: 50 minutes  Total Billable Time: 45 minutes      Subjective     Patient reports: she has been feeling a lot better overall.  No major low back pain with mild/slight Right sided neck and scapula c/o.  Missed last week as she had to go to the Dentist.  She did report that with release to Right rectus femoris/vastus lateralis and Right Quadratus Lumborum, she also felt release up into her Right scapula and neck.     She was limited compliant with home exercise program.  Response to previous treatment: muscle sore but then improved some  Functional change:  doing everything but just having less  "pain, not hesitating or thinking about it as much    Pain: 1-2/10 stiffness  Location: occiput, cerv paraspinals, UT, levator scap    Objective      Objective measures taken at reassessment unless otherwise specified    Treatment     Glendy received the treatments listed below:      manual therapy techniques: Joint mobilizations, Myofacial release, and Soft tissue Mobilization were applied to the: neck and low back for 30 minutes, including:    STM trapezius, levator scap, rhomboids bilaterally  Suboccipital release, STM along cervical paraspinals  Education on self treatment with tennis balls in a sock for suboccipital release  NP--supine mobilization of lower cervical, side glides and down glides  -NP-prone CTJ and upper T-spine P-A mobs (mixed some AROM neck extension to assess mobility)    BELOW NOT PERFORMED  -Supine and sidelying trigger point and IASTM release of Right>Left rectus femoris, vastus lateralis, tensor fascia latae, Iliotibial Band with intermittent stretching  -Supine muscle energy technique correction of Right sided pelvic downslip with AI f/b shotgun technique  -sidelying opening technique to Right Quadratus Lumborum and longer paravertebral muscles with trigger point release  -rib area of greatest restriction (step forward on Left side, back on Right side) started on Left and then progressed to Right  - thoracic Sidebending     NP--STM along B UT cervical paraspinals, suboccipital release  NP-- 1st rib mobilization (gentle) bilaterally       therapeutic exercises to develop strength, endurance, ROM, flexibility, posture, and core stabilization for 15 minutes including:  (NP) Self IASTM release to Right>Left thigh  Self muscle energy technique (Push/pull without stick) correction of Right sided pelvic downslip with AI  Sidelying Quadratus Lumborum + lat stretch with opening technique x 2'  Supine cervical nod 10 x 5" - reviewed use of tennis balls for home  Supine cervical chin tuck + neck " "flexion hold (for deep neck flexor strength) 10 x 5-10s  (extremely challenging)      Supine on towel  Hands behind head pec stretch + chin tuck (fingers assist with subocc release) 5" hold x 10 reps  Thoracic extension + pec stretch with mini snow ayla x 20    Standing with back to wall  Upright posture with chin tuck  Upright posture + chin tuck + shoulder abduction    neuromuscular re-education activities to improve: Coordination, Kinesthetic, Proprioception, Posture, and Motor Control for 00 minutes. The following activities were included:  Cervical:  - Cat cow x 20  - thread the needle quadruped bilateral x 15  - prone chin tuck with retraction (prone on elbows) x 10 3s hold  NP-- cervical isometrics SB,, rotation, flexion, extension bilaterally 10x each with 3 s hold - cues for neutral cervical posture  NP-- Row YTB 2 x 10    Lumbar:   PPT 10 x 5", mod cues initially for proper activation  PPT + ball squeeze 10 x 5"  PPT + glut set into bridges 2 x 10  PPT +  clam with G loop x 20  PPT + clam with G loop into bridges     therapeutic activities to improve functional performance for 00 minutes, including:  Education on use of cervical roll along pillow edge for neck support  Anatomy and postural education on proper neck alignment at rest and with functional activities to avoid over straining and poor postural mechanics   May add at later date      Patient Education and Home Exercises       Education provided:   - avoidance of painful movements as able  - consistent work of stretching, exercises, and HEP  - increasing postural awareness throughout day with tasks and activities     Written Home Exercises Provided:  Instructed patient to continue current home exercise program.  Exercises were reviewed and Glendy was able to demonstrate them prior to the end of the session.  Glendy demonstrated good  understanding of the education provided. See Electronic Medical Record under Patient Instructions for exercises " provided during therapy sessions    Assessment      Improving stiffness level reported, which she statis is trending lower lately; however, she does continue to have increases up to 7-8/10 the next day after last session in the evening after sewing, sometimes seated but mostly standing. Reviewed importance of daily home exercise program compliance to include glute strengthening and pt verbalized understanding. Continues to be challenged with deep neck flexor strength and stabilization exercises but is improving with overall strength and endurance. Improved stiffness after session and headache resolved.  Will benefit from continued physical therapy intervention to progress toward goals set forth in plan of care to improve functional mobility and quality of life.     Glendy Is progressing well towards her goals.   Patient prognosis is Good.     Patient will continue to benefit from skilled outpatient physical therapy to address the deficits listed in the problem list box on initial evaluation, provide pt/family education and to maximize pt's level of independence in the home and community environment.     Patient's spiritual, cultural and educational needs considered and pt agreeable to plan of care and goals.     Anticipated Barriers for therapy: Schedule conflicts, transportation, pain, guarding, tolerance, endurance, posture, postural awareness, joint and soft tissue deficits, chronicity of neck and low back pain.    Goals:   Short-Term: 4 weeks (7/5/2024)      partially met, progressing  Pt will improve Active cervical flexion to > or = 40* to promote return to prior functional status.  Pt will improve Active cervical side bending to > or = 28* to promote return to prior functional status.  Pt will improve Active lumbar Sidebending  to > or = 12* to promote return to prior functional status.  Pt will demonstrate independence with self correction of pelvic alignment and when to perform to reduce pain and  to promote  return to prior functional status.  Pt will demonstrate independence with TOS and ULTT stretches and when to perform to reduce pain and  to promote return to prior functional status.  Pt will improve flexibility of Bilateral  quads, hip flexors, gluts, piriformis, pecs, periscapular, cervical and trunk muscles to help promoted better mobility, posture, alignment, and help return to prior functional status.  Pt will demonstrate improved postural habits as evidenced by improved pelvic alignment,  improved thoracic extension with less forward head posturing, good pelvic alignment with use of heel lift.  Pt will decrease pain levels < or = to 4-5/10 to hep promote appropriate progression of PT, HEP, and ADL's    Pt will be compliant with new home exercise program to assist with PT intervention and help allow appropriate progression through plan of care.     Long-Term: 12 weeks (8/30/2024)       ongoing  Pt will improve low back, thoracic mobility to WFL to allow return to normal activities of daily living.  Pt will improve cervical strength to > or = 5/5 to allow performance of activities of daily living.  Pt will improve pelvic, trunk and scap-thoracic strength/stabilization to > or = to 4/5 to allow better posture, alignment, and help return to prior functional status.  Pt will have > or = to good flexibility of quads, hip flexors, pecs, neck and periscapula muscles to help promoted better mobility, posture, alignment, and help return to prior functional status.  Pt will demonstrate good postural habits as evidenced by good overall posture and alignment to improve QOL and allow return to prior functional status.  Pt will report < or = 3/10 pain during activities of daily living to improve QOL and allow return to prior functional status.  Pt will be compliant and independent with HEP to allow and maintain better participation in normal activities  Pt will be able to resume normals ADL's, IADL's, previous activities,  fitness, and work related tasks     Plan     Continue with: Plan of care Certification: 6/4/2024 to 8/30/2024.    Continue with current POC toward established physical therapy goals.    Herbie Parisi, PT

## 2024-08-01 ENCOUNTER — PATIENT MESSAGE (OUTPATIENT)
Dept: OBSTETRICS AND GYNECOLOGY | Facility: CLINIC | Age: 59
End: 2024-08-01
Payer: COMMERCIAL

## 2024-08-02 ENCOUNTER — CLINICAL SUPPORT (OUTPATIENT)
Dept: REHABILITATION | Facility: HOSPITAL | Age: 59
End: 2024-08-02
Payer: COMMERCIAL

## 2024-08-02 DIAGNOSIS — M43.6 NECK STIFFNESS: ICD-10-CM

## 2024-08-02 DIAGNOSIS — M54.50 CHRONIC RIGHT-SIDED LOW BACK PAIN WITHOUT SCIATICA: ICD-10-CM

## 2024-08-02 DIAGNOSIS — M62.81 WEAKNESS OF TRUNK MUSCULATURE: ICD-10-CM

## 2024-08-02 DIAGNOSIS — G89.29 CHRONIC RIGHT-SIDED LOW BACK PAIN WITHOUT SCIATICA: ICD-10-CM

## 2024-08-02 DIAGNOSIS — M62.89 QUADRICEP TIGHTNESS: ICD-10-CM

## 2024-08-02 DIAGNOSIS — R29.898 WEAKNESS OF BOTH HIPS: ICD-10-CM

## 2024-08-02 DIAGNOSIS — M25.69 BACK STIFFNESS: ICD-10-CM

## 2024-08-02 DIAGNOSIS — M54.2 NECK PAIN: Primary | ICD-10-CM

## 2024-08-02 PROCEDURE — 97110 THERAPEUTIC EXERCISES: CPT | Mod: PN

## 2024-08-02 PROCEDURE — 97140 MANUAL THERAPY 1/> REGIONS: CPT | Mod: PN

## 2024-08-06 ENCOUNTER — CLINICAL SUPPORT (OUTPATIENT)
Dept: REHABILITATION | Facility: HOSPITAL | Age: 59
End: 2024-08-06
Payer: COMMERCIAL

## 2024-08-06 DIAGNOSIS — G89.29 CHRONIC RIGHT-SIDED LOW BACK PAIN WITHOUT SCIATICA: ICD-10-CM

## 2024-08-06 DIAGNOSIS — M43.6 NECK STIFFNESS: ICD-10-CM

## 2024-08-06 DIAGNOSIS — M62.81 WEAKNESS OF TRUNK MUSCULATURE: ICD-10-CM

## 2024-08-06 DIAGNOSIS — R29.898 WEAKNESS OF BOTH HIPS: ICD-10-CM

## 2024-08-06 DIAGNOSIS — M62.89 QUADRICEP TIGHTNESS: ICD-10-CM

## 2024-08-06 DIAGNOSIS — M54.50 CHRONIC RIGHT-SIDED LOW BACK PAIN WITHOUT SCIATICA: ICD-10-CM

## 2024-08-06 DIAGNOSIS — M54.2 NECK PAIN: Primary | ICD-10-CM

## 2024-08-06 DIAGNOSIS — M25.69 BACK STIFFNESS: ICD-10-CM

## 2024-08-06 PROCEDURE — 97014 ELECTRIC STIMULATION THERAPY: CPT | Mod: PN

## 2024-08-06 PROCEDURE — 97140 MANUAL THERAPY 1/> REGIONS: CPT | Mod: PN

## 2024-08-13 ENCOUNTER — CLINICAL SUPPORT (OUTPATIENT)
Dept: REHABILITATION | Facility: HOSPITAL | Age: 59
End: 2024-08-13
Payer: COMMERCIAL

## 2024-08-13 DIAGNOSIS — R29.898 WEAKNESS OF BOTH HIPS: ICD-10-CM

## 2024-08-13 DIAGNOSIS — M62.81 WEAKNESS OF TRUNK MUSCULATURE: ICD-10-CM

## 2024-08-13 DIAGNOSIS — G89.29 CHRONIC RIGHT-SIDED LOW BACK PAIN WITHOUT SCIATICA: ICD-10-CM

## 2024-08-13 DIAGNOSIS — M43.6 NECK STIFFNESS: ICD-10-CM

## 2024-08-13 DIAGNOSIS — M54.50 CHRONIC RIGHT-SIDED LOW BACK PAIN WITHOUT SCIATICA: ICD-10-CM

## 2024-08-13 DIAGNOSIS — M25.69 BACK STIFFNESS: ICD-10-CM

## 2024-08-13 DIAGNOSIS — M62.89 QUADRICEP TIGHTNESS: ICD-10-CM

## 2024-08-13 DIAGNOSIS — M54.2 NECK PAIN: Primary | ICD-10-CM

## 2024-08-13 PROCEDURE — 97112 NEUROMUSCULAR REEDUCATION: CPT | Mod: PN,CQ

## 2024-08-13 PROCEDURE — 97110 THERAPEUTIC EXERCISES: CPT | Mod: PN,CQ

## 2024-08-13 PROCEDURE — 97140 MANUAL THERAPY 1/> REGIONS: CPT | Mod: PN,CQ

## 2024-08-13 NOTE — PROGRESS NOTES
[facility]   Physical Therapy Treatment Note      Name: Glendy Andrade  Clinic Number: [id number]    Therapy Diagnosis:   Encounter Diagnoses   Name Primary?    Neck pain Yes    Back stiffness     Neck stiffness     Chronic right-sided low back pain without sciatica     Weakness of trunk musculature     Weakness of both hips     Quadricep tightness      Physician: Ganesh Long MD    Visit Date: 8/13/2024    Physician Orders: PT Eval and Treat   Medical Diagnosis from Referral: Cervical spinal stenosis [M48.02], Lumbar disc herniation [M51.26]   Evaluation Date: 6/4/2024  Authorization Period Expiration: 12/31/2024  Plan of Care Expiration: 8/30/2024  Progress Note Due: 8/30/2024 (Last Progress Note 8/2/2024, 7/12/2024)  Visit # / Visits authorized: 12 / 20 +eval  FOTO: Issued Visit #: 1/3, (capture on visit 1, 5, 10) first on 6/4/2024     PTA Visit #: 1/5     Precautions: Standard, ADD, depression, history of MRSA infection, inflammatory spondylopathy, DISH (diffuse idiopathic skeletal hyperostosis), insomnia, possible diagnosis of psoriatic arthritis and autoimmune disorder,  MRI which shows cervical stenosis, possible cord signal change (possible need for decompression and fusion surgery).  right L4/5 protrusion in addition to canal stenosis at that level      Time In: 1:05  Time Out: 1:50  Total Time: 45 minutes  Total Billable Time: 45 minutes      Subjective     Patient reports: woke with a headache today and tightness to the tops of the shoulders that radiates up the neck and into the base of the skull. Low back doing better overall, just a little tight.     She was limited compliant with home exercise program.  Response to previous treatment: muscle sore but then improved some  Functional change:  doing everything but just having less pain, not hesitating or thinking about it as much    Pain: 7/10 pain Right side of ribs and neck.  Down to 3/10 after treatment  Location:  occiput, cerv paraspinals, UT, levator scap    Objective    Last Progress Note 2024,      Treatment     Glendy received the treatments listed below:      manual therapy techniques: Joint mobilizations, Myofacial release, and Soft tissue Mobilization were applied to the: neck and low back for 15 minutes, including:    STM trapezius, levator scap, rhomboids bilaterally  Suboccipital release, STM along cervical paraspinals  Education on self treatment with tennis balls in a sock for suboccipital release  NP--supine mobilization of lower cervical, side glides and down glides  Prone CTJ and upper T-spine P-A mobs (mixed some AROM neck extension to assess mobility)    -Supine and sidelying trigger point and IASTM release of Right>Left rectus femoris, vastus lateralis, tensor fascia latae, Iliotibial Band with intermittent stretching  -Supine muscle energy technique correction of Right sided pelvic downslip with AI f/b shotgun technique  -sidelying opening technique to Right Quadratus Lumborum and longer paravertebral muscles with trigger point release  -rib area of greatest restriction (step forward on Left side, back on Right side) started on Left and then progressed to Right  - thoracic Sidebending     STM along B UT cervical paraspinals, suboccipital release  1st rib mobilization (gentle) bilaterally    NOT PERFORMED: Dry needling:  The pt expressed desire to receive trigger point dry needling today. Informed and written consent was obtained including benefits and risks of treatments, copy of signed consent form will be placed in patient's medical record. Verification of pt name, , and site of treatment was performed prior to treatment. Pt was positioned in sidelying to receive trigger point dry needling to Right Quadratus Lumborum with 75 mm needles and lumbar paravertebral muscles L3 and L5 with 40 mm serin J type needles. LTR achieved and pt reported familiar pain reproduction. Electro dry needling x 10  "minutes.  The pt did not have adverse response to treatment. This was followed by STM to further decrease trigger point activity and pain, patient tolerated well. Post treatment patient demonstrated decreased pain per report following. Pt was educated to drink plenty of water following treatment to help with muscle soreness. Pt was educated to contact therapist post treatment as needed.        therapeutic exercises to develop strength, endurance, ROM, flexibility, posture, and core stabilization for 22 minutes including:  (NP) Self IASTM release to Right>Left thigh  Self muscle energy technique (Push/pull without stick) correction of Right sided pelvic downslip with AI  Sidelying Quadratus Lumborum + lat stretch with opening technique x 2'  Supine cervical nod 10 x 5" - reviewed use of tennis balls for home  Supine cervical chin tuck + neck flexion hold (for deep neck flexor strength) 10 x 5-10s  (extremely challenging)      Supine on towel  Hands behind head pec stretch + chin tuck (fingers assist with subocc release) 5" hold x 10 reps  Thoracic extension + pec stretch with mini snow ayla x 20    Standing with back to wall  Upright posture with chin tuck  Upright posture + chin tuck + shoulder abduction    neuromuscular re-education activities to improve: Coordination, Kinesthetic, Proprioception, Posture, and Motor Control for 08 minutes. The following activities were included:  Cervical:  - Cat cow x 20  NP- thread the needle quadruped bilateral x 15  - prone chin tuck with retraction (prone on elbows) with YTB around head x 15 5s hold  NP-- cervical isometrics SB,, rotation, flexion, extension bilaterally 10x each with 3 s hold - cues for neutral cervical posture  NP-- Row YTB 2 x 10    BELOW NOT PERFORMED  Lumbar:   PPT 10 x 5", mod cues initially for proper activation  PPT + ball squeeze 10 x 5"  PPT + glut set into bridges 2 x 10  PPT +  clam with G loop x 20  PPT + clam with G loop into bridges "     therapeutic activities to improve functional performance for 00 minutes, including:  Education on use of cervical roll along pillow edge for neck support  Anatomy and postural education on proper neck alignment at rest and with functional activities to avoid over straining and poor postural mechanics   May add at later date      Patient Education and Home Exercises       Education provided:   - avoidance of painful movements as able  - consistent work of stretching, exercises, and HEP  - increasing postural awareness throughout day with tasks and activities     Written Home Exercises Provided:  Instructed patient to continue current home exercise program.  Exercises were reviewed and Glendy was able to demonstrate them prior to the end of the session.  Glendy demonstrated good  understanding of the education provided. See Electronic Medical Record under Patient Instructions for exercises provided during therapy sessions    Assessment     Pt progressing well with postural awareness overall. Presents this date to clinic with suboccipital tightness which travels into B UT and paravertebrals that loosened with manual techniques. Palpable and audible cavitations with upper thoracic joint mobilizations in prone. Some challenge to avoid TLJ hinging with back flat against wall and with supine exercises but was able to correct with cueing. Emphasis placed on cervicothoracic region this date, may further work lumbopelvic region next session. Will benefit from continued physical therapy intervention to progress toward goals set forth in plan of care to improve functional mobility and quality of life.     Glendy Is progressing well towards her goals.   Patient prognosis is Good.     Patient will continue to benefit from skilled outpatient physical therapy to address the deficits listed in the problem list box on initial evaluation, provide pt/family education and to maximize pt's level of independence in the home and community  environment.     Patient's spiritual, cultural and educational needs considered and pt agreeable to plan of care and goals.     Anticipated Barriers for therapy: Schedule conflicts, transportation, pain, guarding, tolerance, endurance, posture, postural awareness, joint and soft tissue deficits, chronicity of neck and low back pain.    Goals:   Short-Term: 4 weeks (7/5/2024)      mostly met, progressing  Pt will improve Active cervical flexion to > or = 40* to promote return to prior functional status.  Pt will improve Active cervical side bending to > or = 28* to promote return to prior functional status.  Pt will improve Active lumbar Sidebending  to > or = 12* to promote return to prior functional status.  Pt will demonstrate independence with self correction of pelvic alignment and when to perform to reduce pain and  to promote return to prior functional status.  Pt will demonstrate independence with TOS and ULTT stretches and when to perform to reduce pain and  to promote return to prior functional status.  Pt will improve flexibility of Bilateral  quads, hip flexors, gluts, piriformis, pecs, periscapular, cervical and trunk muscles to help promoted better mobility, posture, alignment, and help return to prior functional status.  Pt will demonstrate improved postural habits as evidenced by improved pelvic alignment,  improved thoracic extension with less forward head posturing, good pelvic alignment with use of heel lift.  Pt will decrease pain levels < or = to 4-5/10 to hep promote appropriate progression of PT, HEP, and ADL's    Pt will be compliant with new home exercise program to assist with PT intervention and help allow appropriate progression through plan of care.     Long-Term: 12 weeks (8/30/2024)       ongoing  Pt will improve low back, thoracic mobility to WFL to allow return to normal activities of daily living.  Pt will improve cervical strength to > or = 5/5 to allow performance of activities of  daily living.  Pt will improve pelvic, trunk and scap-thoracic strength/stabilization to > or = to 4/5 to allow better posture, alignment, and help return to prior functional status.  Pt will have > or = to good flexibility of quads, hip flexors, pecs, neck and periscapula muscles to help promoted better mobility, posture, alignment, and help return to prior functional status.  Pt will demonstrate good postural habits as evidenced by good overall posture and alignment to improve QOL and allow return to prior functional status.  Pt will report < or = 3/10 pain during activities of daily living to improve QOL and allow return to prior functional status.  Pt will be compliant and independent with HEP to allow and maintain better participation in normal activities  Pt will be able to resume normals ADL's, IADL's, previous activities, fitness, and work related tasks     Plan     Continue with: Plan of care Certification: 6/4/2024 to 8/30/2024.    Continue with current POC toward established physical therapy goals.    Aissatou Hodgson, PTA

## 2024-08-14 NOTE — PROGRESS NOTES
[facility]   Physical Therapy Treatment Note      Name: Glendy Andrade  Clinic Number: [id number]    Therapy Diagnosis:   Encounter Diagnoses   Name Primary?    Neck pain Yes    Back stiffness     Neck stiffness     Chronic right-sided low back pain without sciatica     Weakness of trunk musculature     Weakness of both hips     Quadricep tightness        Physician: Ganesh Long MD    Visit Date: 8/15/2024    Physician Orders: PT Eval and Treat   Medical Diagnosis from Referral: Cervical spinal stenosis [M48.02], Lumbar disc herniation [M51.26]   Evaluation Date: 6/4/2024  Authorization Period Expiration: 12/31/2024  Plan of Care Expiration: 8/30/2024  Progress Note Due: 8/30/2024 (Last Progress Note 8/2/2024, 7/12/2024)  Visit # / Visits authorized: 13 / 20 +eval  FOTO: Issued Visit #: 1/3, (capture on visit 1, 5, 10) first on 6/4/2024     PTA Visit #: 0/5     Precautions: Standard, ADD, depression, history of MRSA infection, inflammatory spondylopathy, DISH (diffuse idiopathic skeletal hyperostosis), insomnia, possible diagnosis of psoriatic arthritis and autoimmune disorder,  MRI which shows cervical stenosis, possible cord signal change (possible need for decompression and fusion surgery).  right L4/5 protrusion in addition to canal stenosis at that level      Time In: 4:00  Time Out: 4:45  Total Time: 45 minutes  Total Billable Time: 45 minutes      Subjective     Patient reports: her neck is feeling better with no HA today.  Did well from her last visit.  She started to feel onset of some c/o recently and stopped to do her home exercise program with her tightness and sense of HA resolving.  She was very pleased with this.    Today she is more aware of Right hip tightness and Right LBP.     She was limited compliant with home exercise program.  Response to previous treatment:  felt better  Functional change:  doing everything but just having less pain, not hesitating or thinking  about it as much    Pain: 3-4/10 pain Right side of ribs and neck.  Down to 3/10 after treatment  Location: occiput, cerv paraspinals, UT, levator scap    Objective    Last Progress Note 2024,      Treatment     Glendy received the treatments listed below:      manual therapy techniques: Joint mobilizations, Myofacial release, and Soft tissue Mobilization were applied to the: neck and low back for 45 minutes, including:    STM trapezius, levator scap, rhomboids bilaterally  Suboccipital release, STM along cervical paraspinals  Education on self treatment with tennis balls in a sock for suboccipital release  NP--supine mobilization of lower cervical, side glides and down glides  Prone CTJ and upper T-spine P-A mobs (mixed some AROM neck extension to assess mobility)    -Supine and sidelying trigger point and IASTM release of Right>Left rectus femoris, vastus lateralis, tensor fascia latae, Iliotibial Band with intermittent stretching  -Supine muscle energy technique correction of Right sided pelvic downslip with AI f/b shotgun technique  -sidelying opening technique to Right Quadratus Lumborum and longer paravertebral muscles with trigger point release  -rib area of greatest restriction (step forward on Left side, back on Right side) started on Left and then progressed to Right  - thoracic Sidebending     STM along B UT cervical paraspinals, suboccipital release  1st rib mobilization (gentle) bilaterally    NOT PERFORMED: Dry needling:  The pt expressed desire to receive trigger point dry needling today. Informed and written consent was obtained including benefits and risks of treatments, copy of signed consent form will be placed in patient's medical record. Verification of pt name, , and site of treatment was performed prior to treatment. Pt was positioned in sidelying to receive trigger point dry needling to Right Quadratus Lumborum with 75 mm needles and lumbar paravertebral muscles L3 and L5 with 40 mm  "serin J type needles. LTR achieved and pt reported familiar pain reproduction. Electro dry needling x 10 minutes.  The pt did not have adverse response to treatment. This was followed by STM to further decrease trigger point activity and pain, patient tolerated well. Post treatment patient demonstrated decreased pain per report following. Pt was educated to drink plenty of water following treatment to help with muscle soreness. Pt was educated to contact therapist post treatment as needed.        therapeutic exercises to develop strength, endurance, ROM, flexibility, posture, and core stabilization for 00 minutes including:  (NP) Self IASTM release to Right>Left thigh  Self muscle energy technique (Push/pull without stick) correction of Right sided pelvic downslip with AI  Sidelying Quadratus Lumborum + lat stretch with opening technique x 2'  Supine cervical nod 10 x 5" - reviewed use of tennis balls for home  Supine cervical chin tuck + neck flexion hold (for deep neck flexor strength) 10 x 5-10s  (extremely challenging)      Supine on towel  Hands behind head pec stretch + chin tuck (fingers assist with subocc release) 5" hold x 10 reps  Thoracic extension + pec stretch with mini snow ayla x 20    Standing with back to wall  Upright posture with chin tuck  Upright posture + chin tuck + shoulder abduction    neuromuscular re-education activities to improve: Coordination, Kinesthetic, Proprioception, Posture, and Motor Control for 00 minutes. The following activities were included:  Cervical:  - Cat cow x 20  NP- thread the needle quadruped bilateral x 15  - prone chin tuck with retraction (prone on elbows) with YTB around head x 15 5s hold  NP-- cervical isometrics SB,, rotation, flexion, extension bilaterally 10x each with 3 s hold - cues for neutral cervical posture  NP-- Row YTB 2 x 10    BELOW NOT PERFORMED  Lumbar:   PPT 10 x 5", mod cues initially for proper activation  PPT + ball squeeze 10 x 5"  PPT + " glut set into bridges 2 x 10  PPT +  clam with G loop x 20  PPT + clam with G loop into bridges     therapeutic activities to improve functional performance for 00 minutes, including:  Education on use of cervical roll along pillow edge for neck support  Anatomy and postural education on proper neck alignment at rest and with functional activities to avoid over straining and poor postural mechanics   May add at later date      Patient Education and Home Exercises       Education provided:   - avoidance of painful movements as able  - consistent work of stretching, exercises, and HEP  - increasing postural awareness throughout day with tasks and activities     Written Home Exercises Provided:  Instructed patient to continue current home exercise program.  Exercises were reviewed and Glendy was able to demonstrate them prior to the end of the session.  Glendy demonstrated good  understanding of the education provided. See Electronic Medical Record under Patient Instructions for exercises provided during therapy sessions    Assessment     Pt has been doing much better overall but has still encountered some intermittent exacerbations.  She has been able to manage some c/o with home exercise program and feels like she is making progress with this.  Today she had increased Right side low back pain with increased Right rectus femoris and vastus lateralis tightness resulting in increased Right AI with Right sacroiliac joint and low back pain.  Very good relief with treatment today but still has tightness in deeper fibers that needs to improve. Will benefit from continued physical therapy intervention to progress toward goals set forth in plan of care to improve functional mobility and quality of life.     Glendy Is progressing well towards her goals.   Patient prognosis is Good.     Patient will continue to benefit from skilled outpatient physical therapy to address the deficits listed in the problem list box on initial  evaluation, provide pt/family education and to maximize pt's level of independence in the home and community environment.     Patient's spiritual, cultural and educational needs considered and pt agreeable to plan of care and goals.     Anticipated Barriers for therapy: Schedule conflicts, transportation, pain, guarding, tolerance, endurance, posture, postural awareness, joint and soft tissue deficits, chronicity of neck and low back pain.    Goals:   Short-Term: 4 weeks (7/5/2024)      mostly met, progressing  Pt will improve Active cervical flexion to > or = 40* to promote return to prior functional status.  Pt will improve Active cervical side bending to > or = 28* to promote return to prior functional status.  Pt will improve Active lumbar Sidebending  to > or = 12* to promote return to prior functional status.  Pt will demonstrate independence with self correction of pelvic alignment and when to perform to reduce pain and  to promote return to prior functional status.  Pt will demonstrate independence with TOS and ULTT stretches and when to perform to reduce pain and  to promote return to prior functional status.  Pt will improve flexibility of Bilateral  quads, hip flexors, gluts, piriformis, pecs, periscapular, cervical and trunk muscles to help promoted better mobility, posture, alignment, and help return to prior functional status.  Pt will demonstrate improved postural habits as evidenced by improved pelvic alignment,  improved thoracic extension with less forward head posturing, good pelvic alignment with use of heel lift.  Pt will decrease pain levels < or = to 4-5/10 to hep promote appropriate progression of PT, HEP, and ADL's    Pt will be compliant with new home exercise program to assist with PT intervention and help allow appropriate progression through plan of care.     Long-Term: 12 weeks (8/30/2024)       ongoing  Pt will improve low back, thoracic mobility to WFL to allow return to normal  activities of daily living.  Pt will improve cervical strength to > or = 5/5 to allow performance of activities of daily living.  Pt will improve pelvic, trunk and scap-thoracic strength/stabilization to > or = to 4/5 to allow better posture, alignment, and help return to prior functional status.  Pt will have > or = to good flexibility of quads, hip flexors, pecs, neck and periscapula muscles to help promoted better mobility, posture, alignment, and help return to prior functional status.  Pt will demonstrate good postural habits as evidenced by good overall posture and alignment to improve QOL and allow return to prior functional status.  Pt will report < or = 3/10 pain during activities of daily living to improve QOL and allow return to prior functional status.  Pt will be compliant and independent with HEP to allow and maintain better participation in normal activities  Pt will be able to resume normals ADL's, IADL's, previous activities, fitness, and work related tasks     Plan     Continue with: Plan of care Certification: 6/4/2024 to 8/30/2024.    Continue with current POC toward established physical therapy goals.    Herbie Parisi, PT

## 2024-08-15 ENCOUNTER — CLINICAL SUPPORT (OUTPATIENT)
Dept: REHABILITATION | Facility: HOSPITAL | Age: 59
End: 2024-08-15
Payer: COMMERCIAL

## 2024-08-15 DIAGNOSIS — M62.81 WEAKNESS OF TRUNK MUSCULATURE: ICD-10-CM

## 2024-08-15 DIAGNOSIS — M25.69 BACK STIFFNESS: ICD-10-CM

## 2024-08-15 DIAGNOSIS — G89.29 CHRONIC RIGHT-SIDED LOW BACK PAIN WITHOUT SCIATICA: ICD-10-CM

## 2024-08-15 DIAGNOSIS — M54.2 NECK PAIN: Primary | ICD-10-CM

## 2024-08-15 DIAGNOSIS — M62.89 QUADRICEP TIGHTNESS: ICD-10-CM

## 2024-08-15 DIAGNOSIS — M54.50 CHRONIC RIGHT-SIDED LOW BACK PAIN WITHOUT SCIATICA: ICD-10-CM

## 2024-08-15 DIAGNOSIS — M43.6 NECK STIFFNESS: ICD-10-CM

## 2024-08-15 DIAGNOSIS — R29.898 WEAKNESS OF BOTH HIPS: ICD-10-CM

## 2024-08-15 PROCEDURE — 97140 MANUAL THERAPY 1/> REGIONS: CPT | Mod: PN

## 2024-08-20 ENCOUNTER — CLINICAL SUPPORT (OUTPATIENT)
Dept: REHABILITATION | Facility: HOSPITAL | Age: 59
End: 2024-08-20
Payer: COMMERCIAL

## 2024-08-20 DIAGNOSIS — M25.69 BACK STIFFNESS: ICD-10-CM

## 2024-08-20 DIAGNOSIS — M54.2 NECK PAIN: Primary | ICD-10-CM

## 2024-08-20 DIAGNOSIS — M54.50 CHRONIC RIGHT-SIDED LOW BACK PAIN WITHOUT SCIATICA: ICD-10-CM

## 2024-08-20 DIAGNOSIS — M62.89 QUADRICEP TIGHTNESS: ICD-10-CM

## 2024-08-20 DIAGNOSIS — M43.6 NECK STIFFNESS: ICD-10-CM

## 2024-08-20 DIAGNOSIS — G89.29 CHRONIC RIGHT-SIDED LOW BACK PAIN WITHOUT SCIATICA: ICD-10-CM

## 2024-08-20 DIAGNOSIS — R29.898 WEAKNESS OF BOTH HIPS: ICD-10-CM

## 2024-08-20 DIAGNOSIS — M62.81 WEAKNESS OF TRUNK MUSCULATURE: ICD-10-CM

## 2024-08-20 PROCEDURE — 97140 MANUAL THERAPY 1/> REGIONS: CPT | Mod: PN,CQ

## 2024-08-20 PROCEDURE — 97110 THERAPEUTIC EXERCISES: CPT | Mod: PN,CQ

## 2024-08-20 NOTE — PROGRESS NOTES
[facility]   Physical Therapy Treatment Note      Name: Glendy Andrade  Clinic Number: [id number]    Therapy Diagnosis:   Encounter Diagnoses   Name Primary?    Neck pain Yes    Back stiffness     Neck stiffness     Chronic right-sided low back pain without sciatica     Weakness of trunk musculature     Weakness of both hips     Quadricep tightness        Physician: Ganesh Long MD    Visit Date: 8/20/2024    Physician Orders: PT Eval and Treat   Medical Diagnosis from Referral: Cervical spinal stenosis [M48.02], Lumbar disc herniation [M51.26]   Evaluation Date: 6/4/2024  Authorization Period Expiration: 12/31/2024  Plan of Care Expiration: 8/30/2024  Progress Note Due: 8/30/2024 (Last Progress Note 8/2/2024, 7/12/2024)  Visit # / Visits authorized: 14 / 20 +eval  FOTO: Issued Visit #: 1/3, (capture on visit 1, 5, 10) first on 6/4/2024     PTA Visit #: 1/5     Precautions: Standard, ADD, depression, history of MRSA infection, inflammatory spondylopathy, DISH (diffuse idiopathic skeletal hyperostosis), insomnia, possible diagnosis of psoriatic arthritis and autoimmune disorder,  MRI which shows cervical stenosis, possible cord signal change (possible need for decompression and fusion surgery).  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 Having to pack up and move bolts of fabric that aren't heavy but is repetitive. Low back doing pretty good, worst in the morning and lessens as the day goes on.      She was limited compliant with home exercise program.  Response to previous treatment:  felt better  Functional change:  doing everything but just having less pain, not hesitating or thinking about it as much    Pain: 4-5/10 pain Right side of ribs  and neck.  Down to -3/10 after treatment  Location: occiput, cerv paraspinals, UT, levator scap    Objective    Last Progress Note 2024,      Treatment     Glendy received the treatments listed below:      manual therapy techniques: Joint mobilizations, Myofacial release, and Soft tissue Mobilization were applied to the: neck and low back for 25 minutes, including:    STM trapezius, levator scap, rhomboids bilaterally  Suboccipital release, STM along cervical paraspinals  Education on self treatment with tennis balls in a sock for suboccipital release  NP--supine mobilization of lower cervical, side glides and down glides  Prone CTJ and upper T-spine P-A mobs (mixed some AROM neck extension to assess mobility)    -Supine and sidelying trigger point and IASTM release of Right>Left rectus femoris, vastus lateralis, tensor fascia latae, Iliotibial Band with intermittent stretching  -Supine muscle energy technique correction of Right sided pelvic downslip with AI f/b shotgun technique  -sidelying opening technique to Right Quadratus Lumborum and longer paravertebral muscles with trigger point release  -rib area of greatest restriction (step forward on Left side, back on Right side) started on Left and then progressed to Right  - thoracic Sidebending     STM along B UT cervical paraspinals, suboccipital release  1st rib mobilization (gentle) bilaterally    NOT PERFORMED: Dry needling:  The pt expressed desire to receive trigger point dry needling today. Informed and written consent was obtained including benefits and risks of treatments, copy of signed consent form will be placed in patient's medical record. Verification of pt name, , and site of treatment was performed prior to treatment. Pt was positioned in sidelying to receive trigger point dry needling to Right Quadratus Lumborum with 75 mm needles and lumbar paravertebral muscles L3 and L5 with 40 mm serin J type needles. LTR achieved and pt reported  "familiar pain reproduction. Electro dry needling x 10 minutes.  The pt did not have adverse response to treatment. This was followed by STM to further decrease trigger point activity and pain, patient tolerated well. Post treatment patient demonstrated decreased pain per report following. Pt was educated to drink plenty of water following treatment to help with muscle soreness. Pt was educated to contact therapist post treatment as needed.        therapeutic exercises to develop strength, endurance, ROM, flexibility, posture, and core stabilization for 15 minutes including:   - prone scap retract 10 x 5" bilateral  - prone scap retract + W lift, 10 x 5" hold   Supine on towel--hands behind head pec stretch + chin tuck (fingers assist with subocc release) 5" hold x 10 reps    BELOW NOT PERFORMED  (NP) Self IASTM release to Right>Left thigh  Self muscle energy technique (Push/pull without stick) correction of Right sided pelvic downslip with AI  Sidelying Quadratus Lumborum + lat stretch with opening technique x 2'  Supine cervical nod 10 x 5" - reviewed use of tennis balls for home  Supine cervical chin tuck + neck flexion hold (for deep neck flexor strength) 10 x 5-10s  (extremely challenging)  Thoracic extension + pec stretch with mini snow ayla x 20    Standing with back to wall  Upright posture with chin tuck  Upright posture + chin tuck + shoulder abduction    neuromuscular re-education activities to improve: Coordination, Kinesthetic, Proprioception, Posture, and Motor Control for 00 minutes. The following activities were included:  Cervical:  NP- Cat cow x 20  NP- thread the needle quadruped bilateral x 15  - prone chin tuck with retraction (prone on elbows) with YTB around head x 15 5s hold  NP-- cervical isometrics SB,, rotation, flexion, extension bilaterally 10x each with 3 s hold - cues for neutral cervical posture  NP-- Row YTB 2 x 10    BELOW NOT PERFORMED  Lumbar:   PPT 10 x 5", mod cues initially for " "proper activation  PPT + ball squeeze 10 x 5"  PPT + glut set into bridges 2 x 10  PPT +  clam with G loop x 20  PPT + clam with G loop into bridges     therapeutic activities to improve functional performance for 00 minutes, including:  Education on use of cervical roll along pillow edge for neck support  Anatomy and postural education on proper neck alignment at rest and with functional activities to avoid over straining and poor postural mechanics   May add at later date      Patient Education and Home Exercises       Education provided:   - avoidance of painful movements as able  - consistent work of stretching, exercises, and HEP  - increasing postural awareness throughout day with tasks and activities     Written Home Exercises Provided:  Instructed patient to continue current home exercise program.  Exercises were reviewed and Glendy was able to demonstrate them prior to the end of the session.  Glendy demonstrated good  understanding of the education provided. See Electronic Medical Record under Patient Instructions for exercises provided during therapy sessions    Assessment     Pt improving with overall thoracic mobility, some limitation of cervical mobility and symptoms continues. 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 Light gentle exercises performed for scapular mobility and posture awareness. Emphasis on cervicothoracic region today as lumbar spine has been self-managing well. Will benefit from continued physical therapy intervention to progress toward goals set forth in plan of care to improve functional mobility and quality of life.     Glendy Is progressing well towards her goals.   Patient prognosis is Good.     Patient will continue to benefit from skilled outpatient physical therapy to address the deficits listed in the problem list box on initial evaluation, provide pt/family education and to maximize pt's level of " independence in the home and community environment.     Patient's spiritual, cultural and educational needs considered and pt agreeable to plan of care and goals.     Anticipated Barriers for therapy: Schedule conflicts, transportation, pain, guarding, tolerance, endurance, posture, postural awareness, joint and soft tissue deficits, chronicity of neck and low back pain.    Goals:   Short-Term: 4 weeks (7/5/2024)      mostly met, progressing  Pt will improve Active cervical flexion to > or = 40* to promote return to prior functional status.  Pt will improve Active cervical side bending to > or = 28* to promote return to prior functional status.  Pt will improve Active lumbar Sidebending  to > or = 12* to promote return to prior functional status.  Pt will demonstrate independence with self correction of pelvic alignment and when to perform to reduce pain and  to promote return to prior functional status.  Pt will demonstrate independence with TOS and ULTT stretches and when to perform to reduce pain and  to promote return to prior functional status.  Pt will improve flexibility of Bilateral  quads, hip flexors, gluts, piriformis, pecs, periscapular, cervical and trunk muscles to help promoted better mobility, posture, alignment, and help return to prior functional status.  Pt will demonstrate improved postural habits as evidenced by improved pelvic alignment,  improved thoracic extension with less forward head posturing, good pelvic alignment with use of heel lift.  Pt will decrease pain levels < or = to 4-5/10 to hep promote appropriate progression of PT, HEP, and ADL's    Pt will be compliant with new home exercise program to assist with PT intervention and help allow appropriate progression through plan of care.     Long-Term: 12 weeks (8/30/2024)       ongoing  Pt will improve low back, thoracic mobility to WFL to allow return to normal activities of daily living.  Pt will improve cervical strength to > or = 5/5  to allow performance of activities of daily living.  Pt will improve pelvic, trunk and scap-thoracic strength/stabilization to > or = to 4/5 to allow better posture, alignment, and help return to prior functional status.  Pt will have > or = to good flexibility of quads, hip flexors, pecs, neck and periscapula muscles to help promoted better mobility, posture, alignment, and help return to prior functional status.  Pt will demonstrate good postural habits as evidenced by good overall posture and alignment to improve QOL and allow return to prior functional status.  Pt will report < or = 3/10 pain during activities of daily living to improve QOL and allow return to prior functional status.  Pt will be compliant and independent with HEP to allow and maintain better participation in normal activities  Pt will be able to resume normals ADL's, IADL's, previous activities, fitness, and work related tasks     Plan     Continue with: Plan of care Certification: 6/4/2024 to 8/30/2024.    Continue with current POC toward established physical therapy goals.    Aissatou Hodgson, PTA

## 2024-08-22 ENCOUNTER — CLINICAL SUPPORT (OUTPATIENT)
Dept: REHABILITATION | Facility: HOSPITAL | Age: 59
End: 2024-08-22
Payer: COMMERCIAL

## 2024-08-22 DIAGNOSIS — M62.81 WEAKNESS OF TRUNK MUSCULATURE: ICD-10-CM

## 2024-08-22 DIAGNOSIS — R29.898 WEAKNESS OF BOTH HIPS: ICD-10-CM

## 2024-08-22 DIAGNOSIS — M54.50 CHRONIC RIGHT-SIDED LOW BACK PAIN WITHOUT SCIATICA: ICD-10-CM

## 2024-08-22 DIAGNOSIS — M43.6 NECK STIFFNESS: ICD-10-CM

## 2024-08-22 DIAGNOSIS — M25.69 BACK STIFFNESS: ICD-10-CM

## 2024-08-22 DIAGNOSIS — M54.2 NECK PAIN: Primary | ICD-10-CM

## 2024-08-22 DIAGNOSIS — G89.29 CHRONIC RIGHT-SIDED LOW BACK PAIN WITHOUT SCIATICA: ICD-10-CM

## 2024-08-22 DIAGNOSIS — M62.89 QUADRICEP TIGHTNESS: ICD-10-CM

## 2024-08-22 PROCEDURE — 97140 MANUAL THERAPY 1/> REGIONS: CPT | Mod: PN

## 2024-08-22 PROCEDURE — 97110 THERAPEUTIC EXERCISES: CPT | Mod: PN

## 2024-08-22 NOTE — PROGRESS NOTES
[facility] OUTPATIENT THERAPY AND WELLNESS   Physical Therapy Treatment Note      Name: Glendy Andrade  Clinic Number: [id number]    Therapy Diagnosis:   Encounter Diagnoses   Name Primary?    Neck pain Yes    Back stiffness     Neck stiffness     Chronic right-sided low back pain without sciatica     Weakness of trunk musculature     Weakness of both hips     Quadricep tightness      Physician: Ganesh Long MD    Visit Date: 8/22/2024    Physician Orders: PT Eval and Treat   Medical Diagnosis from Referral: Cervical spinal stenosis [M48.02], Lumbar disc herniation [M51.26]   Evaluation Date: 6/4/2024  Authorization Period Expiration: 12/31/2024  Plan of Care Expiration: 8/30/2024  Progress Note Due: 8/30/2024 (Last Progress Note 8/2/2024, 7/12/2024)  Visit # / Visits authorized: 15 / 20 +eval  FOTO: Issued Visit #: 1/3, (capture on visit 1, 5, 10) first on 6/4/2024     PTA Visit #: 0/5     Precautions: Standard, ADD, depression, history of MRSA infection, inflammatory spondylopathy, DISH (diffuse idiopathic skeletal hyperostosis), insomnia, possible diagnosis of psoriatic arthritis and autoimmune disorder,  MRI which shows cervical stenosis, possible cord signal change (possible need for decompression and fusion surgery).  right L4/5 protrusion in addition to canal stenosis at that level      Time In: 11:15  Time Out: 12:00  Total Time: 45 minutes  Total Billable Time: 45 minutes      Subjective     Patient reports: she is mostly doing pretty well today.  A little neck stiffness that improved with manual therapy and felt really good after foam rolling.      She was limited compliant with home exercise program.  Response to previous treatment:  felt better  Functional change:  doing everything but just having less pain, not hesitating or thinking about it as much    Pain: 1-2/10 pain Right side of ribs and neck.  Down to 2-3/10 after treatment  Location: occiput, cerv paraspinals, UT, levator scap    Objective     Last Progress Note 2024,      Treatment     Glendy received the treatments listed below:      manual therapy techniques: Joint mobilizations, Myofacial release, and Soft tissue Mobilization were applied to the: neck and low back for 35 minutes, including:    STM trapezius, levator scap, rhomboids bilaterally  Suboccipital release, STM along cervical paraspinals  Education on self treatment with tennis balls in a sock for suboccipital release  NP--supine mobilization of lower cervical, side glides and down glides  Prone CTJ and upper T-spine P-A mobs (mixed some AROM neck extension to assess mobility)    -Supine and sidelying trigger point and IASTM release of Right>Left rectus femoris, vastus lateralis, tensor fascia latae, Iliotibial Band with intermittent stretching  -Supine muscle energy technique correction of Right sided pelvic downslip with AI f/b shotgun technique  -sidelying opening technique to Right Quadratus Lumborum and longer paravertebral muscles with trigger point release  -rib area of greatest restriction (step forward on Left side, back on Right side) started on Left and then progressed to Right  - thoracic Sidebending     STM along B UT cervical paraspinals, suboccipital release  1st rib mobilization (gentle) bilaterally    NOT PERFORMED: Dry needling:  The pt expressed desire to receive trigger point dry needling today. Informed and written consent was obtained including benefits and risks of treatments, copy of signed consent form will be placed in patient's medical record. Verification of pt name, , and site of treatment was performed prior to treatment. Pt was positioned in sidelying to receive trigger point dry needling to Right Quadratus Lumborum with 75 mm needles and lumbar paravertebral muscles L3 and L5 with 40 mm serin J type needles. LTR achieved and pt reported familiar pain reproduction. Electro dry needling x 10 minutes.  The pt did not have adverse response to treatment.  "This was followed by [facility] to further decrease trigger point activity and pain, patient tolerated well. Post treatment patient demonstrated decreased pain per report following. Pt was educated to drink plenty of water following treatment to help with muscle soreness. Pt was educated to contact therapist post treatment as needed.        therapeutic exercises to develop strength, endurance, ROM, flexibility, posture, and core stabilization for 15 minutes including:  Thoracic extension foam rolling  Pec stretching with foam roll with hands behind head  Snow ayla with foam roll   - prone scap retract 10 x 5" bilateral  - prone scap retract + W lift, 10 x 5" hold   Supine on towel--hands behind head pec stretch + chin tuck (fingers assist with subocc release) 5" hold x 10 reps    BELOW NOT PERFORMED  (NP) Self IASTM release to Right>Left thigh  Self muscle energy technique (Push/pull without stick) correction of Right sided pelvic downslip with AI  Sidelying Quadratus Lumborum + lat stretch with opening technique x 2'  Supine cervical nod 10 x 5" - reviewed use of tennis balls for home  Supine cervical chin tuck + neck flexion hold (for deep neck flexor strength) 10 x 5-10s  (extremely challenging)  Thoracic extension + pec stretch with mini snow ayla x 20    Standing with back to wall  Upright posture with chin tuck  Upright posture + chin tuck + shoulder abduction    neuromuscular re-education activities to improve: Coordination, Kinesthetic, Proprioception, Posture, and Motor Control for 00 minutes. The following activities were included:  Cervical:  NP- Cat cow x 20  NP- thread the needle quadruped bilateral x 15  - prone chin tuck with retraction (prone on elbows) with YTB around head x 15 5s hold  NP-- cervical isometrics SB,, rotation, flexion, extension bilaterally 10x each with 3 s hold - cues for neutral cervical posture  NP-- Row YTB 2 x 10    BELOW NOT PERFORMED  Lumbar:   PPT 10 x 5", mod cues initially for " "proper activation  PPT + ball squeeze 10 x 5"  PPT + glut set into bridges 2 x 10  PPT +  clam with G loop x 20  PPT + clam with G loop into bridges     therapeutic activities to improve functional performance for 00 minutes, including:  Education on use of cervical roll along pillow edge for neck support  Anatomy and postural education on proper neck alignment at rest and with functional activities to avoid over straining and poor postural mechanics   May add at later date      Patient Education and Home Exercises       Education provided:   - avoidance of painful movements as able  - consistent work of stretching, exercises, and HEP  - increasing postural awareness throughout day with tasks and activities     Written Home Exercises Provided:  Instructed patient to continue current home exercise program.  Exercises were reviewed and Glendy was able to demonstrate them prior to the end of the session.  Glendy demonstrated good  understanding of the education provided. See Electronic Medical Record under Patient Instructions for exercises provided during therapy sessions    Assessment     Pt presenting with some milder Right hip and quad tightness, Right AI.  Improved with manual therapy.  Was able to focus more on Right sided mid thoracic and rib restrictions f/b adding foam rolling exercises.  Improvements in mobility resulting in less stiffness and mostly resolved neck c/o today. Will benefit from continued physical therapy intervention to progress toward goals set forth in plan of care to improve functional mobility and quality of life.     Glendy Is progressing well towards her goals.   Patient prognosis is Good.     Patient will continue to benefit from skilled outpatient physical therapy to address the deficits listed in the problem list box on initial evaluation, provide pt/family education and to maximize pt's level of independence in the home and community environment.     Patient's spiritual, cultural and " educational needs considered and pt agreeable to plan of care and goals.     Anticipated Barriers for therapy: Schedule conflicts, transportation, pain, guarding, tolerance, endurance, posture, postural awareness, joint and soft tissue deficits, chronicity of neck and low back pain.    Goals:   Short-Term: 4 weeks (7/5/2024)      mostly met, progressing  Pt will improve Active cervical flexion to > or = 40* to promote return to prior functional status.  Pt will improve Active cervical side bending to > or = 28* to promote return to prior functional status.  Pt will improve Active lumbar Sidebending  to > or = 12* to promote return to prior functional status.  Pt will demonstrate independence with self correction of pelvic alignment and when to perform to reduce pain and  to promote return to prior functional status.  Pt will demonstrate independence with TOS and ULTT stretches and when to perform to reduce pain and  to promote return to prior functional status.  Pt will improve flexibility of Bilateral  quads, hip flexors, gluts, piriformis, pecs, periscapular, cervical and trunk muscles to help promoted better mobility, posture, alignment, and help return to prior functional status.  Pt will demonstrate improved postural habits as evidenced by improved pelvic alignment,  improved thoracic extension with less forward head posturing, good pelvic alignment with use of heel lift.  Pt will decrease pain levels < or = to 4-5/10 to hep promote appropriate progression of PT, HEP, and ADL's    Pt will be compliant with new home exercise program to assist with PT intervention and help allow appropriate progression through plan of care.     Long-Term: 12 weeks (8/30/2024)       ongoing  Pt will improve low back, thoracic mobility to WFL to allow return to normal activities of daily living.  Pt will improve cervical strength to > or = 5/5 to allow performance of activities of daily living.  Pt will improve pelvic, trunk and  scap-thoracic strength/stabilization to > or = to 4/5 to allow better posture, alignment, and help return to prior functional status.  Pt will have > or = to good flexibility of quads, hip flexors, pecs, neck and periscapula muscles to help promoted better mobility, posture, alignment, and help return to prior functional status.  Pt will demonstrate good postural habits as evidenced by good overall posture and alignment to improve QOL and allow return to prior functional status.  Pt will report < or = 3/10 pain during activities of daily living to improve QOL and allow return to prior functional status.  Pt will be compliant and independent with HEP to allow and maintain better participation in normal activities  Pt will be able to resume normals ADL's, IADL's, previous activities, fitness, and work related tasks     Plan     Continue with: Plan of care Certification: 6/4/2024 to 8/30/2024.    Continue with current POC toward established physical therapy goals.    Herbie Parisi, PT

## 2024-08-26 ENCOUNTER — CLINICAL SUPPORT (OUTPATIENT)
Dept: REHABILITATION | Facility: HOSPITAL | Age: 59
End: 2024-08-26
Payer: COMMERCIAL

## 2024-08-26 DIAGNOSIS — M43.6 NECK STIFFNESS: ICD-10-CM

## 2024-08-26 DIAGNOSIS — G89.29 CHRONIC RIGHT-SIDED LOW BACK PAIN WITHOUT SCIATICA: ICD-10-CM

## 2024-08-26 DIAGNOSIS — M62.81 WEAKNESS OF TRUNK MUSCULATURE: ICD-10-CM

## 2024-08-26 DIAGNOSIS — R29.898 WEAKNESS OF BOTH HIPS: ICD-10-CM

## 2024-08-26 DIAGNOSIS — M62.89 QUADRICEP TIGHTNESS: ICD-10-CM

## 2024-08-26 DIAGNOSIS — M25.69 BACK STIFFNESS: ICD-10-CM

## 2024-08-26 DIAGNOSIS — M54.2 NECK PAIN: Primary | ICD-10-CM

## 2024-08-26 DIAGNOSIS — M54.50 CHRONIC RIGHT-SIDED LOW BACK PAIN WITHOUT SCIATICA: ICD-10-CM

## 2024-08-26 PROCEDURE — 97140 MANUAL THERAPY 1/> REGIONS: CPT | Mod: PN

## 2024-08-26 PROCEDURE — 97110 THERAPEUTIC EXERCISES: CPT | Mod: PN

## 2024-08-26 NOTE — PROGRESS NOTES
[facility]   Physical Therapy Treatment Note      Name: Glendy Andrade  Clinic Number: [id number]    Therapy Diagnosis:   Encounter Diagnoses   Name Primary?    Neck pain Yes    Back stiffness     Neck stiffness     Chronic right-sided low back pain without sciatica     Weakness of trunk musculature     Weakness of both hips     Quadricep tightness      Physician: Ganesh Long MD    Visit Date: 8/26/2024    Physician Orders: PT Eval and Treat   Medical Diagnosis from Referral: Cervical spinal stenosis [M48.02], Lumbar disc herniation [M51.26]   Evaluation Date: 6/4/2024  Authorization Period Expiration: 12/31/2024  Plan of Care Expiration: 8/30/2024  Progress Note Due: 8/30/2024 (Last Progress Note 8/2/2024, 7/12/2024)  Visit # / Visits authorized: 16 / 20 +eval  FOTO: Issued Visit #: 1/3, (capture on visit 1, 5, 10) first on 6/4/2024     PTA Visit #: 0/5     Precautions: Standard, ADD, depression, history of MRSA infection, inflammatory spondylopathy, DISH (diffuse idiopathic skeletal hyperostosis), insomnia, possible diagnosis of psoriatic arthritis and autoimmune disorder,  MRI which shows cervical stenosis, possible cord signal change (possible need for decompression and fusion surgery).  right L4/5 protrusion in addition to canal stenosis at that level      Time In: 3:15  Time Out:  4:00  Total Time: 45 minutes  Total Billable Time: 45 minutes      Subjective     Patient reports: she has been doing some moving of stuff and furniture around so is a little stiff but took breaks to stretch out and feels like she did a pretty good job of managing c/o today.  Foam roll came in today so will start using it.  Really liked it last visit.    She was limited compliant with home exercise program.  Response to previous treatment:  felt better  Functional change:  doing everything but just having less pain, not hesitating or thinking about it as much    Pain: 1-2/10 pain Right side of ribs and  neck.  Down to -3/10 after treatment  Location: occiput, cerv paraspinals, UT, levator scap    Objective    Last Progress Note 2024,      Treatment     Glendy received the treatments listed below:      manual therapy techniques: Joint mobilizations, Myofacial release, and Soft tissue Mobilization were applied to the: neck and low back for 35 minutes, including:    STM trapezius, levator scap, rhomboids bilaterally  Suboccipital release, STM along cervical paraspinals  Education on self treatment with tennis balls in a sock for suboccipital release  NP--supine mobilization of lower cervical, side glides and down glides  Prone CTJ and upper T-spine P-A mobs (mixed some AROM neck extension to assess mobility)    -Supine and sidelying trigger point and IASTM release of Right>Left rectus femoris, vastus lateralis, tensor fascia latae, Iliotibial Band with intermittent stretching  -Supine muscle energy technique correction of Right sided pelvic downslip with AI f/b shotgun technique  -sidelying opening technique to Right Quadratus Lumborum and longer paravertebral muscles with trigger point release  -rib area of greatest restriction (step forward on Left side, back on Right side) started on Left and then progressed to Right  - thoracic Sidebending     STM along B UT cervical paraspinals, suboccipital release  1st rib mobilization (gentle) bilaterally    NOT PERFORMED: Dry needling:  The pt expressed desire to receive trigger point dry needling today. Informed and written consent was obtained including benefits and risks of treatments, copy of signed consent form will be placed in patient's medical record. Verification of pt name, , and site of treatment was performed prior to treatment. Pt was positioned in sidelying to receive trigger point dry needling to Right Quadratus Lumborum with 75 mm needles and lumbar paravertebral muscles L3 and L5 with 40 mm serin J type needles. LTR achieved and pt reported familiar  "pain reproduction. Electro dry needling x 10 minutes.  The pt did not have adverse response to treatment. This was followed by STM to further decrease trigger point activity and pain, patient tolerated well. Post treatment patient demonstrated decreased pain per report following. Pt was educated to drink plenty of water following treatment to help with muscle soreness. Pt was educated to contact therapist post treatment as needed.        therapeutic exercises to develop strength, endurance, ROM, flexibility, posture, and core stabilization for 15 minutes including:  Thoracic extension foam rolling  Pec stretching with foam roll with hands behind head  Snow ayla with foam roll   - prone scap retract 10 x 5" bilateral  - prone scap retract + W lift, 10 x 5" hold   Supine on towel--hands behind head pec stretch + chin tuck (fingers assist with subocc release) 5" hold x 10 reps    BELOW NOT PERFORMED  (NP) Self IASTM release to Right>Left thigh  Self muscle energy technique (Push/pull without stick) correction of Right sided pelvic downslip with AI  Sidelying Quadratus Lumborum + lat stretch with opening technique x 2'  Supine cervical nod 10 x 5" - reviewed use of tennis balls for home  Supine cervical chin tuck + neck flexion hold (for deep neck flexor strength) 10 x 5-10s  (extremely challenging)  Thoracic extension + pec stretch with mini snow ayla x 20    Standing with back to wall  Upright posture with chin tuck  Upright posture + chin tuck + shoulder abduction    neuromuscular re-education activities to improve: Coordination, Kinesthetic, Proprioception, Posture, and Motor Control for 00 minutes. The following activities were included:  Cervical:  NP- Cat cow x 20  NP- thread the needle quadruped bilateral x 15  - prone chin tuck with retraction (prone on elbows) with YTB around head x 15 5s hold  NP-- cervical isometrics SB,, rotation, flexion, extension bilaterally 10x each with 3 s hold - cues for neutral " "cervical posture  NP-- Row YTB 2 x 10    BELOW NOT PERFORMED  Lumbar:   PPT 10 x 5", mod cues initially for proper activation  PPT + ball squeeze 10 x 5"  PPT + glut set into bridges 2 x 10  PPT +  clam with G loop x 20  PPT + clam with G loop into bridges     therapeutic activities to improve functional performance for 00 minutes, including:  Education on use of cervical roll along pillow edge for neck support  Anatomy and postural education on proper neck alignment at rest and with functional activities to avoid over straining and poor postural mechanics   May add at later date      Patient Education and Home Exercises       Education provided:   - avoidance of painful movements as able  - consistent work of stretching, exercises, and HEP  - increasing postural awareness throughout day with tasks and activities     Written Home Exercises Provided:  Instructed patient to continue current home exercise program.  Exercises were reviewed and Glendy was able to demonstrate them prior to the end of the session.  Glendy demonstrated good  understanding of the education provided. See Electronic Medical Record under Patient Instructions for exercises provided during therapy sessions    Assessment     Pt presenting with some milder Right hip and quad tightness, Right AI.  Improved with manual therapy.  Was able to focus more on Right sided mid thoracic and rib restrictions f/b adding foam rolling exercises.  Improvements in mobility resulting in less stiffness and mostly resolved neck c/o today. Will benefit from continued physical therapy intervention to progress toward goals set forth in plan of care to improve functional mobility and quality of life.     Glendy Is progressing well towards her goals.   Patient prognosis is Good.     Patient will continue to benefit from skilled outpatient physical therapy to address the deficits listed in the problem list box on initial evaluation, provide pt/family education and to maximize " pt's level of independence in the home and community environment.     Patient's spiritual, cultural and educational needs considered and pt agreeable to plan of care and goals.     Anticipated Barriers for therapy: Schedule conflicts, transportation, pain, guarding, tolerance, endurance, posture, postural awareness, joint and soft tissue deficits, chronicity of neck and low back pain.    Goals:   Short-Term: 4 weeks (7/5/2024)      mostly met, progressing  Pt will improve Active cervical flexion to > or = 40* to promote return to prior functional status.  Pt will improve Active cervical side bending to > or = 28* to promote return to prior functional status.  Pt will improve Active lumbar Sidebending  to > or = 12* to promote return to prior functional status.  Pt will demonstrate independence with self correction of pelvic alignment and when to perform to reduce pain and  to promote return to prior functional status.  Pt will demonstrate independence with TOS and ULTT stretches and when to perform to reduce pain and  to promote return to prior functional status.  Pt will improve flexibility of Bilateral  quads, hip flexors, gluts, piriformis, pecs, periscapular, cervical and trunk muscles to help promoted better mobility, posture, alignment, and help return to prior functional status.  Pt will demonstrate improved postural habits as evidenced by improved pelvic alignment,  improved thoracic extension with less forward head posturing, good pelvic alignment with use of heel lift.  Pt will decrease pain levels < or = to 4-5/10 to hep promote appropriate progression of PT, HEP, and ADL's    Pt will be compliant with new home exercise program to assist with PT intervention and help allow appropriate progression through plan of care.     Long-Term: 12 weeks (8/30/2024)       ongoing  Pt will improve low back, thoracic mobility to WFL to allow return to normal activities of daily living.  Pt will improve cervical strength  to > or = 5/5 to allow performance of activities of daily living.  Pt will improve pelvic, trunk and scap-thoracic strength/stabilization to > or = to 4/5 to allow better posture, alignment, and help return to prior functional status.  Pt will have > or = to good flexibility of quads, hip flexors, pecs, neck and periscapula muscles to help promoted better mobility, posture, alignment, and help return to prior functional status.  Pt will demonstrate good postural habits as evidenced by good overall posture and alignment to improve QOL and allow return to prior functional status.  Pt will report < or = 3/10 pain during activities of daily living to improve QOL and allow return to prior functional status.  Pt will be compliant and independent with HEP to allow and maintain better participation in normal activities  Pt will be able to resume normals ADL's, IADL's, previous activities, fitness, and work related tasks     Plan     Continue with: Plan of care Certification: 6/4/2024 to 8/30/2024.    Continue with current POC toward established physical therapy goals.    Herbie Parisi, PT

## 2024-09-04 ENCOUNTER — CLINICAL SUPPORT (OUTPATIENT)
Dept: REHABILITATION | Facility: HOSPITAL | Age: 59
End: 2024-09-04
Payer: COMMERCIAL

## 2024-09-04 DIAGNOSIS — M43.6 NECK STIFFNESS: ICD-10-CM

## 2024-09-04 DIAGNOSIS — M62.89 QUADRICEP TIGHTNESS: ICD-10-CM

## 2024-09-04 DIAGNOSIS — G89.29 CHRONIC RIGHT-SIDED LOW BACK PAIN WITHOUT SCIATICA: ICD-10-CM

## 2024-09-04 DIAGNOSIS — M54.50 CHRONIC RIGHT-SIDED LOW BACK PAIN WITHOUT SCIATICA: ICD-10-CM

## 2024-09-04 DIAGNOSIS — M54.2 NECK PAIN: Primary | ICD-10-CM

## 2024-09-04 DIAGNOSIS — R29.898 WEAKNESS OF BOTH HIPS: ICD-10-CM

## 2024-09-04 DIAGNOSIS — M62.81 WEAKNESS OF TRUNK MUSCULATURE: ICD-10-CM

## 2024-09-04 DIAGNOSIS — M25.69 BACK STIFFNESS: ICD-10-CM

## 2024-09-04 PROCEDURE — 97110 THERAPEUTIC EXERCISES: CPT | Mod: PN

## 2024-09-04 PROCEDURE — 97140 MANUAL THERAPY 1/> REGIONS: CPT | Mod: PN

## 2024-09-04 NOTE — PROGRESS NOTES
[facility]   Physical Therapy Treatment Note      Name: Glendy Andrade  Clinic Number: [id number]    Therapy Diagnosis:   Encounter Diagnoses   Name Primary?    Neck pain Yes    Back stiffness     Neck stiffness     Chronic right-sided low back pain without sciatica     Weakness of trunk musculature     Weakness of both hips     Quadricep tightness      Physician: Ganesh Long MD    Visit Date: 9/4/2024    Physician Orders: PT Eval and Treat   Medical Diagnosis from Referral: Cervical spinal stenosis [M48.02], Lumbar disc herniation [M51.26]   Evaluation Date: 6/4/2024  Authorization Period Expiration: 12/31/2024  Plan of Care Expiration: 8/30/2024  Progress Note Due: 8/30/2024 (Last Progress Note 8/2/2024, 7/12/2024)  Visit # / Visits authorized: 17 / 20 +eval  FOTO: Issued Visit #: 1/3, (capture on visit 1, 5, 10) first on 6/4/2024     PTA Visit #: 0/5     Precautions: Standard, ADD, depression, history of MRSA infection, inflammatory spondylopathy, DISH (diffuse idiopathic skeletal hyperostosis), insomnia, possible diagnosis of psoriatic arthritis and autoimmune disorder,  MRI which shows cervical stenosis, possible cord signal change (possible need for decompression and fusion surgery).  right L4/5 protrusion in addition to canal stenosis at that level      Time In: 10:30  Time Out:  11:15  Total Time: 45 minutes  Total Billable Time: 45 minutes      Subjective     Patient reports: she has been really busy moving some stuff around and getting ready for an event coming up.  Got so busy she forgot about her last visit.  She had some increased tension the other day that was bothering her neck on Right side with c/o HA.  Is better today.  She also found a better pillow that she is using that seems to be helping as well.  It gives her good support but she has to sleep on her back.  Otherwise she moves a lot in her sleep and can wake up in awkward positions with increased neck pain.  She  has been using foam roll at home that she ordered and loves it.  Helps a lot.    She was limited compliant with home exercise program.  Response to previous treatment:  felt better  Functional change:  doing everything but just having less pain, not hesitating or thinking about it as much    Pain: 1-2/10 pain Right side of ribs and neck.  Down to 2-3/10 after treatment  Location: occiput, cerv paraspinals, UT, levator scap    Objective    Last Progress Note 8/2/2024,      Treatment     Glendy received the treatments listed below:      manual therapy techniques: Joint mobilizations, Myofacial release, and Soft tissue Mobilization were applied to the: neck and low back for 35 minutes, including:    STM trapezius, levator scap, rhomboids bilaterally  Suboccipital release, STM along cervical paraspinals  Education on self treatment with tennis balls in a sock for suboccipital release  NP--supine mobilization of lower cervical, side glides and down glides  Prone CTJ and upper T-spine P-A mobs (mixed some AROM neck extension to assess mobility)    -Supine and sidelying trigger point and IASTM release of Right>Left rectus femoris, vastus lateralis, tensor fascia latae, Iliotibial Band with intermittent stretching  -Supine muscle energy technique correction of Right sided pelvic downslip with AI f/b shotgun technique  -sidelying opening technique to Right Quadratus Lumborum and longer paravertebral muscles with trigger point release  -rib area of greatest restriction (step forward on Left side, back on Right side) started on Left and then progressed to Right  - thoracic Sidebending     STM along B UT cervical paraspinals, suboccipital release  1st rib mobilization (gentle) bilaterally    NOT PERFORMED: Dry needling:  The pt expressed desire to receive trigger point dry needling today. Informed and written consent was obtained including benefits and risks of treatments, copy of signed consent form will be placed in patient's  "medical record. Verification of pt name, , and site of treatment was performed prior to treatment. Pt was positioned in sidelying to receive trigger point dry needling to Right Quadratus Lumborum with 75 mm needles and lumbar paravertebral muscles L3 and L5 with 40 mm serin J type needles. LTR achieved and pt reported familiar pain reproduction. Electro dry needling x 10 minutes.  The pt did not have adverse response to treatment. This was followed by STM to further decrease trigger point activity and pain, patient tolerated well. Post treatment patient demonstrated decreased pain per report following. Pt was educated to drink plenty of water following treatment to help with muscle soreness. Pt was educated to contact therapist post treatment as needed.        therapeutic exercises to develop strength, endurance, ROM, flexibility, posture, and core stabilization for 15 minutes including:  Thoracic extension foam rolling  Pec stretching with foam roll with hands behind head  Snow ayla with foam roll   - prone scap retract 10 x 5" bilateral  - prone scap retract + W lift, 10 x 5" hold   Supine on towel--hands behind head pec stretch + chin tuck (fingers assist with subocc release) 5" hold x 10 reps    BELOW NOT PERFORMED  (NP) Self IASTM release to Right>Left thigh  Self muscle energy technique (Push/pull without stick) correction of Right sided pelvic downslip with AI  Sidelying Quadratus Lumborum + lat stretch with opening technique x 2'  Supine cervical nod 10 x 5" - reviewed use of tennis balls for home  Supine cervical chin tuck + neck flexion hold (for deep neck flexor strength) 10 x 5-10s  (extremely challenging)  Thoracic extension + pec stretch with mini snow ayla x 20    Standing with back to wall  Upright posture with chin tuck  Upright posture + chin tuck + shoulder abduction    neuromuscular re-education activities to improve: Coordination, Kinesthetic, Proprioception, Posture, and Motor Control for " "00 minutes. The following activities were included:  Cervical:  NP- Cat cow x 20  NP- thread the needle quadruped bilateral x 15  - prone chin tuck with retraction (prone on elbows) with YTB around head x 15 5s hold  NP-- cervical isometrics SB,, rotation, flexion, extension bilaterally 10x each with 3 s hold - cues for neutral cervical posture  NP-- Row YTB 2 x 10    BELOW NOT PERFORMED  Lumbar:   PPT 10 x 5", mod cues initially for proper activation  PPT + ball squeeze 10 x 5"  PPT + glut set into bridges 2 x 10  PPT +  clam with G loop x 20  PPT + clam with G loop into bridges     therapeutic activities to improve functional performance for 00 minutes, including:  Education on use of cervical roll along pillow edge for neck support  Anatomy and postural education on proper neck alignment at rest and with functional activities to avoid over straining and poor postural mechanics   May add at later date      Patient Education and Home Exercises       Education provided:   - avoidance of painful movements as able  - consistent work of stretching, exercises, and HEP  - increasing postural awareness throughout day with tasks and activities     Written Home Exercises Provided:  Instructed patient to continue current home exercise program.  Exercises were reviewed and Glendy was able to demonstrate them prior to the end of the session.  Glendy demonstrated good  understanding of the education provided. See Electronic Medical Record under Patient Instructions for exercises provided during therapy sessions    Assessment     Pt presenting with some milder Right hip and quad tightness, Right AI.  Improved with manual therapy.  Was able to focus more on Right sided Quadratus Lumborum, mid thoracic and rib restrictions f/b adding foam rolling exercises.  Improvements in mobility resulting in less stiffness and mostly resolved neck c/o today. Will benefit from continued physical therapy intervention to progress toward goals set " forth in plan of care to improve functional mobility and quality of life.     Glendy Is progressing well towards her goals.   Patient prognosis is Good.     Patient will continue to benefit from skilled outpatient physical therapy to address the deficits listed in the problem list box on initial evaluation, provide pt/family education and to maximize pt's level of independence in the home and community environment.     Patient's spiritual, cultural and educational needs considered and pt agreeable to plan of care and goals.     Anticipated Barriers for therapy: Schedule conflicts, transportation, pain, guarding, tolerance, endurance, posture, postural awareness, joint and soft tissue deficits, chronicity of neck and low back pain.    Goals:   Short-Term: 4 weeks (7/5/2024)      mostly met, progressing  Pt will improve Active cervical flexion to > or = 40* to promote return to prior functional status.  Pt will improve Active cervical side bending to > or = 28* to promote return to prior functional status.  Pt will improve Active lumbar Sidebending  to > or = 12* to promote return to prior functional status.  Pt will demonstrate independence with self correction of pelvic alignment and when to perform to reduce pain and  to promote return to prior functional status.  Pt will demonstrate independence with TOS and ULTT stretches and when to perform to reduce pain and  to promote return to prior functional status.  Pt will improve flexibility of Bilateral  quads, hip flexors, gluts, piriformis, pecs, periscapular, cervical and trunk muscles to help promoted better mobility, posture, alignment, and help return to prior functional status.  Pt will demonstrate improved postural habits as evidenced by improved pelvic alignment,  improved thoracic extension with less forward head posturing, good pelvic alignment with use of heel lift.  Pt will decrease pain levels < or = to 4-5/10 to hep promote appropriate progression of PT,  HEP, and ADL's    Pt will be compliant with new home exercise program to assist with PT intervention and help allow appropriate progression through plan of care.     Long-Term: 12 weeks (8/30/2024)       ongoing  Pt will improve low back, thoracic mobility to WFL to allow return to normal activities of daily living.  Pt will improve cervical strength to > or = 5/5 to allow performance of activities of daily living.  Pt will improve pelvic, trunk and scap-thoracic strength/stabilization to > or = to 4/5 to allow better posture, alignment, and help return to prior functional status.  Pt will have > or = to good flexibility of quads, hip flexors, pecs, neck and periscapula muscles to help promoted better mobility, posture, alignment, and help return to prior functional status.  Pt will demonstrate good postural habits as evidenced by good overall posture and alignment to improve QOL and allow return to prior functional status.  Pt will report < or = 3/10 pain during activities of daily living to improve QOL and allow return to prior functional status.  Pt will be compliant and independent with HEP to allow and maintain better participation in normal activities  Pt will be able to resume normals ADL's, IADL's, previous activities, fitness, and work related tasks     Plan     Continue with: Plan of care Certification: 6/4/2024 to 8/30/2024.    Continue with current POC toward established physical therapy goals.    Herbie Parisi, PT

## 2024-09-10 ENCOUNTER — TELEPHONE (OUTPATIENT)
Dept: HEMATOLOGY/ONCOLOGY | Facility: CLINIC | Age: 59
End: 2024-09-10
Payer: COMMERCIAL

## 2024-09-10 NOTE — TELEPHONE ENCOUNTER
----- Message from Mai Garcia sent at 9/10/2024 11:12 AM CDT -----  Regarding: reschedule  Contact: patient  Type:  Sooner Appointment Request    Caller is requesting a sooner appointment.  Caller declined first available appointment listed below.  Caller will not accept being placed on the waitlist and is requesting a message be sent to doctor.    Name of Caller:  patient  When is the first available appointment?  09/11/24  Symptoms:    Would the patient rather a call back or a response via [facility]? call  Best Call Back Number:  [phone] (home)     Additional Information:  Please call to reschedule.  Thanks!

## 2024-09-11 ENCOUNTER — OFFICE VISIT (OUTPATIENT)
Dept: HEMATOLOGY/ONCOLOGY | Facility: CLINIC | Age: 59
End: 2024-09-11
Payer: COMMERCIAL

## 2024-09-11 VITALS
TEMPERATURE: 97 F | BODY MASS INDEX: 22.3 KG/M2 | RESPIRATION RATE: 16 BRPM | SYSTOLIC BLOOD PRESSURE: 112 MMHG | HEART RATE: 64 BPM | HEIGHT: 63 IN | OXYGEN SATURATION: 98 % | DIASTOLIC BLOOD PRESSURE: 72 MMHG | WEIGHT: 125.88 LBS

## 2024-09-11 DIAGNOSIS — R76.0 ANTIPHOSPHOLIPID ANTIBODY POSITIVE: ICD-10-CM

## 2024-09-11 PROCEDURE — 3074F SYST BP LT 130 MM HG: CPT | Mod: CPTII,S$GLB,, | Performed by: INTERNAL MEDICINE

## 2024-09-11 PROCEDURE — 3078F DIAST BP <80 MM HG: CPT | Mod: CPTII,S$GLB,, | Performed by: INTERNAL MEDICINE

## 2024-09-11 PROCEDURE — 99999 PR PBB SHADOW E&M-EST. PATIENT-LVL IV: CPT | Mod: PBBFAC,,, | Performed by: INTERNAL MEDICINE

## 2024-09-11 PROCEDURE — 1159F MED LIST DOCD IN RCRD: CPT | Mod: CPTII,S$GLB,, | Performed by: INTERNAL MEDICINE

## 2024-09-11 PROCEDURE — 3008F BODY MASS INDEX DOCD: CPT | Mod: CPTII,S$GLB,, | Performed by: INTERNAL MEDICINE

## 2024-09-11 PROCEDURE — 99205 OFFICE O/P NEW HI 60 MIN: CPT | Mod: S$GLB,,, | Performed by: INTERNAL MEDICINE

## 2024-09-11 RX ORDER — MUPIROCIN 20 MG/G
OINTMENT TOPICAL
COMMUNITY
Start: 2024-05-15

## 2024-09-11 NOTE — PROGRESS NOTES
Subjective     Patient ID: Glendy Andrade is a 58 y.o. female.    Chief Complaint: No chief complaint on file.    HPI  Mrs Andrade is a 58-year-old referred for evaluation for the presence of antiphospholipid antibodies.  Specifically, most recent IgG beta 2 glycoprotein antibodies were normal at 1.6 U/mL while the IgM antibodies were markedly elevated at 109 units/mL.  Tests for lupus anticoagulant and anticardiolipin antibodies were negative.  The IgM beta 2 glycoprotein antibodies were also elevated in April 20, 2024 at 115 units/mL  The patient is referred for evaluation    PAST MEDICAL HISTORY:  Significant for DISH (diffuse idiopathic skeletal hyperostosis) .  Also, significant for kidney stones, hyperlipidemia, HPV infection, depression, and inflammatory spondylopathy  PAST SURGICAL HISTORY:  Status post partial thyroidectomy.  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  She is .  She does not smoke and does not drink alcohol.  FAMILY HISTORY:  Negative for cancer        Review of Systems  Overall she feels well.  Her main complaint is diffuse intermittent pains involving her C-spine thoracic spine and lumbar spine.  She appears anxious with the recent diagnosis but she denies any depression, easy bruising, fevers, chills, night  sweats, weight loss, nausea, vomiting, diarrhea, constipation, diplopia, blurred vision, headache, chest pain, palpitations, shortness of breath, breast pain, abdominal pain, extremity pain, or difficulty ambulating.  The remainder of the ten-point ROS, including general, skin, lymph, H/N, cardiorespiratory, GI, , Neuro, Endocrine, and psychiatric is negative.      Physical Exam       She is alert, oriented to time, place, person, pleasant, well      nourished, in no acute physical distress.                                    VITAL SIGNS:  Reviewed                                      HEENT:  Normal.  There are no nasal, oral, lip,  gingival, auricular, lid,    or conjunctival lesions.  Mucosae are moist and pink, and there is no        thrush.  Pupils are equal, reactive to light and accommodation.              Extraocular muscle movements are intact.  Dentition is good.  There is no frontal or maxillary tenderness.                                     NECK:  Supple without JVD, adenopathy, or thyromegaly.                       LUNGS:  Clear to auscultation without wheezing, rales, or rhonchi.           CARDIOVASCULAR:  Reveals an S1, S2, no murmurs, no rubs, no gallops.         ABDOMEN:  Soft, nontender, without organomegaly.  Bowel sounds are    present.                                                                     EXTREMITIES:  No cyanosis, clubbing, or edema.                               BREASTS:  Deferred.                                     LYMPHATIC:  There is no cervical, axillary, or supraclavicular adenopathy.   SKIN:  Warm and moist, without petechiae, rashes, induration, or ecchymoses.  There are no psoriatic lesions          NEUROLOGIC:  DTRs are 0-1+ bilaterally, symmetrical, motor function is 5/5,  and cranial nerves are  within normal limits.    ASSESSMENT  Presence of antiphospholipid antibodies (beta 2 glycoprotein antibodies) without history of thrombosis  Positive ARTIE, 1:80, homogeneous  Inflammatory arthritis    PLAN  I had a long discussion with Mrs. Andrade.  We went over the implications of her diagnosis.  I explained to her that in the absence of thrombosis, there is no need for treatment.  I would recommend she consider enoxaparin if she ever takes a long flight to [place] or to [place].  Other than that, I have no further recommendations.  She can follow up with the primary care physician and I will be happy to see her on a p.r.n. basis.  If she does develop DVT or PE in the future, she should be treated with warfarin rather than DOACs.  Her multiple questions were answered to her satisfaction.  I spent  approximately 60 minutes reviewing the available records, communicating with her primary care physician, and evaluating the patient, out of which over 50% of the time was spent face to face with the patient in counseling and coordinating this patient's care.

## 2024-09-14 ENCOUNTER — PATIENT MESSAGE (OUTPATIENT)
Dept: OBSTETRICS AND GYNECOLOGY | Facility: CLINIC | Age: 59
End: 2024-09-14
Payer: COMMERCIAL

## 2024-09-14 DIAGNOSIS — Z79.890 HORMONE REPLACEMENT THERAPY (HRT): Primary | ICD-10-CM

## 2024-09-16 RX ORDER — PROGESTERONE 100 MG/1
100 CAPSULE ORAL NIGHTLY
Qty: 90 CAPSULE | Refills: 3 | Status: SHIPPED | OUTPATIENT
Start: 2024-09-16 | End: 2024-09-16

## 2024-09-16 RX ORDER — PROGESTERONE 100 MG/1
100 CAPSULE ORAL NIGHTLY
Qty: 30 CAPSULE | Refills: 11 | Status: SHIPPED | OUTPATIENT
Start: 2024-09-16 | End: 2025-09-16

## 2024-10-31 ENCOUNTER — CLINICAL SUPPORT (OUTPATIENT)
Dept: REHABILITATION | Facility: HOSPITAL | Age: 59
End: 2024-10-31
Payer: COMMERCIAL

## 2024-10-31 DIAGNOSIS — G89.29 CHRONIC RIGHT-SIDED LOW BACK PAIN WITHOUT SCIATICA: ICD-10-CM

## 2024-10-31 DIAGNOSIS — R29.898 WEAKNESS OF BOTH HIPS: ICD-10-CM

## 2024-10-31 DIAGNOSIS — M25.69 BACK STIFFNESS: ICD-10-CM

## 2024-10-31 DIAGNOSIS — M62.89 QUADRICEP TIGHTNESS: ICD-10-CM

## 2024-10-31 DIAGNOSIS — M62.81 WEAKNESS OF TRUNK MUSCULATURE: ICD-10-CM

## 2024-10-31 DIAGNOSIS — M54.2 NECK PAIN: Primary | ICD-10-CM

## 2024-10-31 DIAGNOSIS — M43.6 NECK STIFFNESS: ICD-10-CM

## 2024-10-31 DIAGNOSIS — M54.50 CHRONIC RIGHT-SIDED LOW BACK PAIN WITHOUT SCIATICA: ICD-10-CM

## 2024-10-31 PROCEDURE — 97140 MANUAL THERAPY 1/> REGIONS: CPT | Mod: PN

## 2024-10-31 PROCEDURE — 97014 ELECTRIC STIMULATION THERAPY: CPT | Mod: PN

## 2024-11-04 ENCOUNTER — CLINICAL SUPPORT (OUTPATIENT)
Dept: REHABILITATION | Facility: HOSPITAL | Age: 59
End: 2024-11-04
Payer: COMMERCIAL

## 2024-11-04 DIAGNOSIS — G89.29 CHRONIC RIGHT-SIDED LOW BACK PAIN WITHOUT SCIATICA: ICD-10-CM

## 2024-11-04 DIAGNOSIS — M25.69 BACK STIFFNESS: ICD-10-CM

## 2024-11-04 DIAGNOSIS — M43.6 NECK STIFFNESS: ICD-10-CM

## 2024-11-04 DIAGNOSIS — M62.81 WEAKNESS OF TRUNK MUSCULATURE: ICD-10-CM

## 2024-11-04 DIAGNOSIS — M54.2 NECK PAIN: Primary | ICD-10-CM

## 2024-11-04 DIAGNOSIS — R29.898 WEAKNESS OF BOTH HIPS: ICD-10-CM

## 2024-11-04 DIAGNOSIS — M54.50 CHRONIC RIGHT-SIDED LOW BACK PAIN WITHOUT SCIATICA: ICD-10-CM

## 2024-11-04 DIAGNOSIS — M62.89 QUADRICEP TIGHTNESS: ICD-10-CM

## 2024-11-04 PROCEDURE — 97110 THERAPEUTIC EXERCISES: CPT | Mod: PN

## 2024-11-04 PROCEDURE — 97140 MANUAL THERAPY 1/> REGIONS: CPT | Mod: PN

## 2024-11-04 PROCEDURE — 97010 HOT OR COLD PACKS THERAPY: CPT | Mod: PN

## 2024-11-04 PROCEDURE — 97014 ELECTRIC STIMULATION THERAPY: CPT | Mod: PN

## 2024-11-04 NOTE — PROGRESS NOTES
[facility]   Physical Therapy Treatment Note      Name: lGendy Andrade  Clinic Number: [id number]    Therapy Diagnosis:   Encounter Diagnoses   Name Primary?    Neck pain Yes    Back stiffness     Neck stiffness     Chronic right-sided low back pain without sciatica     Weakness of trunk musculature     Weakness of both hips     Quadricep tightness        Physician: Ganesh Long MD    Visit Date: 11/4/2024    Physician Orders: PT Eval and Treat   Medical Diagnosis from Referral: Cervical spinal stenosis [M48.02], Lumbar disc herniation [M51.26]   Evaluation Date: 6/4/2024  Authorization Period Expiration: 12/31/2024  Plan of Care Expiration: 12/31/2024  Progress Note Due: 11/29/2024 (Last Progress Note 10/31/2024, 8/2/2024, 7/12/2024)  Visit # / Visits authorized: 19 / 32 +eval  FOTO: Issued Visit #: 1/3, (capture on visit 1, 5, 10) first on 6/4/2024     PTA Visit #: 0/5     Precautions: Standard, ADD, depression, history of MRSA infection, inflammatory spondylopathy, DISH (diffuse idiopathic skeletal hyperostosis), insomnia, possible diagnosis of psoriatic arthritis and autoimmune disorder,  MRI which shows cervical stenosis, possible cord signal change (possible need for decompression and fusion surgery).  right L4/5 protrusion in addition to canal stenosis at that level      Time In: 9:45  Time Out:  10:30  Total Time: 45 minutes  Total Billable Time: 45 minutes      Subjective     Patient reports: she was doing very well and got very busy with work.  She was keeping up with home exercise program for the most part but slacked up some as she got busier and felt like she needed to get back in for a tune up.    She was limited compliant with home exercise program.  Response to previous treatment:  felt better  Functional change:  doing everything but just having less pain, not hesitating or thinking about it as much    Pain: 4-5/10 pain Right side of ribs and neck.  Down to 2/10 after  treatment  Location: occiput, cerv paraspinals, UT, levator scap    Objective    Last Progress Note 10/31/2024  Posture/alignment:    Sitting:  with mildly slouched posture, increased forward head posture.  Right scapula lower vs Left with Right Sidebending of trunk.     Standing:  with forward head posture, Right scapula and iliac crest resting lower vs Left side.     Supine:  Right sided pelvic downslip with AI.  ~ 6 mm Right leg length discrepancy.     Gait:  walking with no obvious antalgic gait.  Step down sign Right Lower Extremity into Right stance phase due to Right leg length discrepancy.     Cervical AROM:           Flexion                                           42 was 38   Extension                      WNL (decreased mid to upper thoracic)      Left      Right       Rotation  70  70   Sidebending  28  27 was 25      Cervical Strength:          Flexion                       5-/5   Extension                       5-/5      Left  Right   Rotation  5-/5 was 4/5  5-/5 was 4/5   Sidebending  5-/5  5-/5      Shoulder ROM:    Left AROM  Right AROM    Flexion  WNL  WNL   Abduction  WNL  WNL   (Restricted Right scapula rotation, rib and scap-thoracic restrictions)     Lumbar ROM:     AROM    Flexion  WNL   Extension  WNL   Left Sidebending  10   Right Sidebending  10      Special Tests:     Cervical Radiculopathy     Left  Right   Spurling's Test (A-sidebend) negative    positive      Cervical:     (+)/(-)   Compression negative      Distraction negative         Dermatomes:  upper and lower quarters intact to light touch     Myotomes:    5-/5 Bilateral C7 and T1  Otherwise upper and lower quarter 5/5      strength:  Right=  46 lbs    Left=  40 lbs     Joint Mobility:    Cervical:  some mid but mostly lower cervical, CTJ, and upper thoracic restrictions  Thoracic motion/mobility, scap-thoracic, ribs:  gross stiffness in thoracic region     Hip range of motion/mobility:  Pt positioned in sidelying with hip  extension mobility/flexibility R= -7 was -15, L= neutral was -3.                                                       Left                              R  Hip External Rotation:             5-/5                             5-/5  Hip abduction:                         4/5 was 4-/5                4-/5 was 3+/5  Hip extension:                         4/5 was 3+/5                4/5 was 4-/5     Flexibility:  Decreased flexibility noted Right>Left rectus femoris, vastus lateralis, tensor fascia latae, Iliotibial Band.  Right Quadratus Lumborum, long thoracolumbar paravertebral muscles.  Pecs, suboccipitals.      Treatment     Glendy received the treatments listed below:      manual therapy techniques: Joint mobilizations, Myofacial release, and Soft tissue Mobilization were applied to the: neck and low back for 15 minutes, including:    STM trapezius, levator scap, rhomboids bilaterally  Suboccipital release, STM along cervical paraspinals  Education on self treatment with tennis balls in a sock for suboccipital release  NP--supine mobilization of lower cervical, side glides and down glides  Prone CTJ and upper T-spine P-A mobs (mixed some AROM neck extension to assess mobility)    -Supine and sidelying trigger point and IASTM release of Right>Left rectus femoris, vastus lateralis, tensor fascia latae, Iliotibial Band with intermittent stretching  -Supine muscle energy technique correction of Right sided pelvic downslip with AI f/b shotgun technique  -sidelying opening technique to Right Quadratus Lumborum and longer paravertebral muscles with trigger point release  -rib area of greatest restriction (step forward on Left side, back on Right side) started on Left and then progressed to Right  - thoracic Sidebending     STM along B UT cervical paraspinals, suboccipital release  1st rib mobilization (gentle) bilaterally    NP--Dry needling:  The pt expressed desire to receive trigger point dry needling today. Informed and  "written consent was obtained including benefits and risks of treatments, copy of signed consent form will be placed in patient's medical record. Verification of pt name, , and site of treatment was performed prior to treatment. Pt was positioned in sidelying to receive trigger point dry needling to Right Quadratus Lumborum with 75 mm needles and lumbar paravertebral muscles L3 and L5 with 40 mm serin J type needles. LTR achieved and pt reported familiar pain reproduction. Electro dry needling x 10 minutes.  The pt did not have adverse response to treatment. This was followed by STM to further decrease trigger point activity and pain, patient tolerated well. Post treatment patient demonstrated decreased pain per report following. Pt was educated to drink plenty of water following treatment to help with muscle soreness. Pt was educated to contact therapist post treatment as needed.     MHP and VMS NMES to Right Quadratus Lumborum, latissimus dorsi, lower trap, rhomboids x 15'     therapeutic exercises to develop strength, endurance, ROM, flexibility, posture, and core stabilization for  15 minutes including:  Thoracic extension foam rolling  Pec stretching with foam roll with hands behind head  Snow ayla with foam roll   Supine double knee to chest x 5  Supine fig 4 knees to chest x 5 each   Supine single leg lower trunk rotation x 5, Right Lower Extremity only  - prone scap retract 10 x 5" bilateral  - prone scap retract + W lift, 10 x 5" hold   Supine on towel--hands behind head pec stretch + chin tuck (fingers assist with subocc release) 5" hold x 10 reps    BELOW NOT PERFORMED  (NP) Self IASTM release to Right>Left thigh  Self muscle energy technique (Push/pull without stick) correction of Right sided pelvic downslip with AI  Sidelying Quadratus Lumborum + lat stretch with opening technique x 2'  Supine cervical nod 10 x 5" - reviewed use of tennis balls for home  Supine cervical chin tuck + neck flexion hold " "(for deep neck flexor strength) 10 x 5-10s  (extremely challenging)  Thoracic extension + pec stretch with mini snow ayla x 20    Standing with back to wall  Upright posture with chin tuck  Upright posture + chin tuck + shoulder abduction    neuromuscular re-education activities to improve: Coordination, Kinesthetic, Proprioception, Posture, and Motor Control for 00 minutes. The following activities were included:  Cervical:  NP- Cat cow x 20  NP- thread the needle quadruped bilateral x 15  - prone chin tuck with retraction (prone on elbows) with YTB around head x 15 5s hold  NP-- cervical isometrics SB,, rotation, flexion, extension bilaterally 10x each with 3 s hold - cues for neutral cervical posture  NP-- Row YTB 2 x 10    BELOW NOT PERFORMED  Lumbar:   PPT 10 x 5", mod cues initially for proper activation  PPT + ball squeeze 10 x 5"  PPT + glut set into bridges 2 x 10  PPT +  clam with G loop x 20  PPT + clam with G loop into bridges     therapeutic activities to improve functional performance for 00 minutes, including:  Education on use of cervical roll along pillow edge for neck support  Anatomy and postural education on proper neck alignment at rest and with functional activities to avoid over straining and poor postural mechanics   May add at later date      Patient Education and Home Exercises       Education provided:   - avoidance of painful movements as able  - consistent work of stretching, exercises, and HEP  - increasing postural awareness throughout day with tasks and activities     Written Home Exercises Provided:  Instructed patient to continue current home exercise program.  Exercises were reviewed and Glendy was able to demonstrate them prior to the end of the session.  Glendy demonstrated good  understanding of the education provided. See Electronic Medical Record under Patient Instructions for exercises provided during therapy sessions    Assessment     Pt presenting with some regression in her " status after returning to work and trying to do trial home exercise program.  Increase in her usual tightness that she was previously having from work related movements, positions.  She did well for the most part until work got busy enough that she was less consistent with home exercise program.  She would benefit from PT to help get back on track and provide some relief of her c/o.     Glenyd Is progressing well towards her goals.   Patient prognosis is Good.     Patient will continue to benefit from skilled outpatient physical therapy to address the deficits listed in the problem list box on initial evaluation, provide pt/family education and to maximize pt's level of independence in the home and community environment.     Patient's spiritual, cultural and educational needs considered and pt agreeable to plan of care and goals.     Anticipated Barriers for therapy: Schedule conflicts, transportation, pain, guarding, tolerance, endurance, posture, postural awareness, joint and soft tissue deficits, chronicity of neck and low back pain.    Goals:   Short-Term: 4 weeks (11/29/2024)      mostly met, progressing  Pt will improve Active cervical flexion to > or = 40* to promote return to prior functional status.  Pt will improve Active cervical side bending to > or = 28* to promote return to prior functional status.  Pt will improve Active lumbar Sidebending  to > or = 12* to promote return to prior functional status.  Pt will demonstrate independence with self correction of pelvic alignment and when to perform to reduce pain and  to promote return to prior functional status.  Pt will demonstrate independence with TOS and ULTT stretches and when to perform to reduce pain and  to promote return to prior functional status.  Pt will improve flexibility of Bilateral  quads, hip flexors, gluts, piriformis, pecs, periscapular, cervical and trunk muscles to help promoted better mobility, posture, alignment, and help return to  prior functional status.  Pt will demonstrate improved postural habits as evidenced by improved pelvic alignment,  improved thoracic extension with less forward head posturing, good pelvic alignment with use of heel lift.  Pt will decrease pain levels < or = to 4-5/10 to hep promote appropriate progression of PT, HEP, and ADL's    Pt will be compliant with new home exercise program to assist with PT intervention and help allow appropriate progression through plan of care.     Long-Term: 10 weeks (12/31/2024)       ongoing  Pt will improve low back, thoracic mobility to WFL to allow return to normal activities of daily living.  Pt will improve cervical strength to > or = 5/5 to allow performance of activities of daily living.  Pt will improve pelvic, trunk and scap-thoracic strength/stabilization to > or = to 4/5 to allow better posture, alignment, and help return to prior functional status.  Pt will have > or = to good flexibility of quads, hip flexors, pecs, neck and periscapula muscles to help promoted better mobility, posture, alignment, and help return to prior functional status.  Pt will demonstrate good postural habits as evidenced by good overall posture and alignment to improve QOL and allow return to prior functional status.  Pt will report < or = 3/10 pain during activities of daily living to improve QOL and allow return to prior functional status.  Pt will be compliant and independent with HEP to allow and maintain better participation in normal activities  Pt will be able to resume normals ADL's, IADL's, previous activities, fitness, and work related tasks     Plan     Continue with: Plan of care Certification: 10/31/2024 to 12/31/2024.    Continue with current POC toward established physical therapy goals.    Herbie Parisi, PT

## 2024-11-05 ENCOUNTER — TELEPHONE (OUTPATIENT)
Dept: PHARMACY | Facility: CLINIC | Age: 59
End: 2024-11-05
Payer: COMMERCIAL

## 2024-11-05 NOTE — TELEPHONE ENCOUNTER
[facility]/Population Health Chart Review & Patient Outreach Details For Medication Adherence Project    Reason for Outreach Encounter: 3rd Party payor non-compliance report ([employer], [employer], [employer], etc)  2.  Patient Outreach Method: Reviewed patient chart   3.   Medication in question: rosuvastatin     rosuvastatin dc'ed on 4/3/24.    4.  Reviewed and or Updates Made To: Patient Chart  5. Outreach Outcomes and/or actions taken: Medication discontinued  Additional Notes:

## 2024-11-11 ENCOUNTER — CLINICAL SUPPORT (OUTPATIENT)
Dept: REHABILITATION | Facility: HOSPITAL | Age: 59
End: 2024-11-11
Payer: COMMERCIAL

## 2024-11-11 DIAGNOSIS — R29.898 WEAKNESS OF BOTH HIPS: ICD-10-CM

## 2024-11-11 DIAGNOSIS — M25.69 BACK STIFFNESS: ICD-10-CM

## 2024-11-11 DIAGNOSIS — M54.2 NECK PAIN: Primary | ICD-10-CM

## 2024-11-11 DIAGNOSIS — M43.6 NECK STIFFNESS: ICD-10-CM

## 2024-11-11 DIAGNOSIS — M62.89 QUADRICEP TIGHTNESS: ICD-10-CM

## 2024-11-11 DIAGNOSIS — M62.81 WEAKNESS OF TRUNK MUSCULATURE: ICD-10-CM

## 2024-11-11 DIAGNOSIS — G89.29 CHRONIC RIGHT-SIDED LOW BACK PAIN WITHOUT SCIATICA: ICD-10-CM

## 2024-11-11 DIAGNOSIS — M54.50 CHRONIC RIGHT-SIDED LOW BACK PAIN WITHOUT SCIATICA: ICD-10-CM

## 2024-11-11 PROCEDURE — 97014 ELECTRIC STIMULATION THERAPY: CPT | Mod: PN

## 2024-11-11 PROCEDURE — 97110 THERAPEUTIC EXERCISES: CPT | Mod: PN

## 2024-11-11 PROCEDURE — 97140 MANUAL THERAPY 1/> REGIONS: CPT | Mod: PN

## 2024-11-11 NOTE — PROGRESS NOTES
[facility]   Physical Therapy Treatment Note      Name: Glendy Andrade  Clinic Number: [id number]    Therapy Diagnosis:   No diagnosis found.    Physician: Ganesh Long MD    Visit Date: 11/11/2024    Physician Orders: PT Eval and Treat   Medical Diagnosis from Referral: Cervical spinal stenosis [M48.02], Lumbar disc herniation [M51.26]   Evaluation Date: 6/4/2024  Authorization Period Expiration: 12/31/2024  Plan of Care Expiration: 12/31/2024  Progress Note Due: 11/29/2024 (Last Progress Note 10/31/2024, 8/2/2024, 7/12/2024)  Visit # / Visits authorized: 20 / 32 +eval  FOTO: Issued Visit #: 1/3, (capture on visit 1, 5, 10) first on 6/4/2024     PTA Visit #: 0/5     Precautions: Standard, ADD, depression, history of MRSA infection, inflammatory spondylopathy, DISH (diffuse idiopathic skeletal hyperostosis), insomnia, possible diagnosis of psoriatic arthritis and autoimmune disorder,  MRI which shows cervical stenosis, possible cord signal change (possible need for decompression and fusion surgery).  right L4/5 protrusion in addition to canal stenosis at that level      Time In: 9:00  Time Out:  10:00  Total Time: 60 minutes  Total Billable Time: 55 minutes      Subjective     Patient reports: she had a photo shoot yesterday so it was a long and busy day.  Feeling tight but not too bad.  Still mostly on her Right side.    She was limited compliant with home exercise program.  Response to previous treatment:  felt better  Functional change:  doing everything but just having less pain, not hesitating or thinking about it as much    Pain: 4-5/10 pain Right side of ribs and neck.  Down to 2-3/10 after treatment  Location: occiput, cerv paraspinals, UT, levator scap    Objective    Last Progress Note 10/31/2024  Posture/alignment:    Sitting:  with mildly slouched posture, increased forward head posture.  Right scapula lower vs Left with Right Sidebending of trunk.     Standing:  with forward  head posture, Right scapula and iliac crest resting lower vs Left side.     Supine:  Right sided pelvic downslip with AI.  ~ 6 mm Right leg length discrepancy.     Gait:  walking with no obvious antalgic gait.  Step down sign Right Lower Extremity into Right stance phase due to Right leg length discrepancy.     Cervical AROM:           Flexion                                           42 was 38   Extension                      WNL (decreased mid to upper thoracic)      Left      Right       Rotation  70  70   Sidebending  28  27 was 25      Cervical Strength:          Flexion                       5-/5   Extension                       5-/5      Left  Right   Rotation  5-/5 was 4/5  5-/5 was 4/5   Sidebending  5-/5  5-/5      Shoulder ROM:    Left AROM  Right AROM    Flexion  WNL  WNL   Abduction  WNL  WNL   (Restricted Right scapula rotation, rib and scap-thoracic restrictions)     Lumbar ROM:     AROM    Flexion  WNL   Extension  WNL   Left Sidebending  10   Right Sidebending  10      Special Tests:     Cervical Radiculopathy     Left  Right   Spurling's Test (A-sidebend) negative    positive      Cervical:     (+)/(-)   Compression negative      Distraction negative         Dermatomes:  upper and lower quarters intact to light touch     Myotomes:    5-/5 Bilateral C7 and T1  Otherwise upper and lower quarter 5/5      strength:  Right=  46 lbs    Left=  40 lbs     Joint Mobility:    Cervical:  some mid but mostly lower cervical, CTJ, and upper thoracic restrictions  Thoracic motion/mobility, scap-thoracic, ribs:  gross stiffness in thoracic region     Hip range of motion/mobility:  Pt positioned in sidelying with hip extension mobility/flexibility R= -7 was -15, L= neutral was -3.                                                       Left                              R  Hip External Rotation:             5-/5                             5-/5  Hip abduction:                         4/5 was 4-/5                 4-/5 was 3+/5  Hip extension:                         4/5 was 3+/5                4/5 was 4-/5     Flexibility:  Decreased flexibility noted Right>Left rectus femoris, vastus lateralis, tensor fascia latae, Iliotibial Band.  Right Quadratus Lumborum, long thoracolumbar paravertebral muscles.  Pecs, suboccipitals.      Treatment     Glendy received the treatments listed below:      manual therapy techniques: Joint mobilizations, Myofacial release, and Soft tissue Mobilization were applied to the: neck and low back for 25 minutes, including:    STM trapezius, levator scap, rhomboids bilaterally  Suboccipital release, STM along cervical paraspinals  Education on self treatment with tennis balls in a sock for suboccipital release  NP--supine mobilization of lower cervical, side glides and down glides  Prone CTJ and upper T-spine P-A mobs (mixed some AROM neck extension to assess mobility)    -Supine and sidelying trigger point and IASTM release of Right>Left rectus femoris, vastus lateralis, tensor fascia latae, Iliotibial Band with intermittent stretching  -Supine muscle energy technique correction of Right sided pelvic downslip with AI f/b shotgun technique  -sidelying opening technique to Right Quadratus Lumborum and longer paravertebral muscles with trigger point release  -rib area of greatest restriction (step forward on Left side, back on Right side) started on Left and then progressed to Right  - thoracic Sidebending     STM along B UT cervical paraspinals, suboccipital release  1st rib mobilization (gentle) bilaterally    Dry needling:  The pt expressed desire to receive trigger point dry needling today. Informed and written consent was obtained including benefits and risks of treatments, copy of signed consent form will be placed in patient's medical record. Verification of pt name, , and site of treatment was performed prior to treatment. Pt was positioned in sidelying to receive trigger point dry needling  "to Right Quadratus Lumborum with 75 mm needles and lumbar paravertebral muscles L3 and L5 with 40 mm serin J type needles. LTR achieved and pt reported familiar pain reproduction. Electro dry needling x 10 minutes.  The pt did not have adverse response to treatment. This was followed by STM to further decrease trigger point activity and pain, patient tolerated well. Post treatment patient demonstrated decreased pain per report following. Pt was educated to drink plenty of water following treatment to help with muscle soreness. Pt was educated to contact therapist post treatment as needed.     MHP and VMS NMES to Right Quadratus Lumborum, latissimus dorsi, lower trap, rhomboids x 00'     therapeutic exercises to develop strength, endurance, ROM, flexibility, posture, and core stabilization for  15 minutes including:  Thoracic extension foam rolling  Pec stretching with foam roll with hands behind head  Snow ayla with foam roll   Supine double knee to chest x 5  Supine fig 4 knees to chest x 5 each   Supine single leg lower trunk rotation x 5, Right Lower Extremity only  - prone scap retract 10 x 5" bilateral  - prone scap retract + W lift, 10 x 5" hold   Supine on towel--hands behind head pec stretch + chin tuck (fingers assist with subocc release) 5" hold x 10 reps    BELOW NOT PERFORMED  (NP) Self IASTM release to Right>Left thigh  Self muscle energy technique (Push/pull without stick) correction of Right sided pelvic downslip with AI  Sidelying Quadratus Lumborum + lat stretch with opening technique x 2'  Supine cervical nod 10 x 5" - reviewed use of tennis balls for home  Supine cervical chin tuck + neck flexion hold (for deep neck flexor strength) 10 x 5-10s  (extremely challenging)  Thoracic extension + pec stretch with mini snow ayla x 20    Standing with back to wall  Upright posture with chin tuck  Upright posture + chin tuck + shoulder abduction    neuromuscular re-education activities to improve: " "Coordination, Kinesthetic, Proprioception, Posture, and Motor Control for 00 minutes. The following activities were included:  Cervical:  NP- Cat cow x 20  NP- thread the needle quadruped bilateral x 15  - prone chin tuck with retraction (prone on elbows) with YTB around head x 15 5s hold  NP-- cervical isometrics SB,, rotation, flexion, extension bilaterally 10x each with 3 s hold - cues for neutral cervical posture  NP-- Row YTB 2 x 10    BELOW NOT PERFORMED  Lumbar:   PPT 10 x 5", mod cues initially for proper activation  PPT + ball squeeze 10 x 5"  PPT + glut set into bridges 2 x 10  PPT +  clam with G loop x 20  PPT + clam with G loop into bridges     therapeutic activities to improve functional performance for 00 minutes, including:  Education on use of cervical roll along pillow edge for neck support  Anatomy and postural education on proper neck alignment at rest and with functional activities to avoid over straining and poor postural mechanics   May add at later date      Patient Education and Home Exercises       Education provided:   - avoidance of painful movements as able  - consistent work of stretching, exercises, and HEP  - increasing postural awareness throughout day with tasks and activities     Written Home Exercises Provided:  Instructed patient to continue current home exercise program.  Exercises were reviewed and Glendy was able to demonstrate them prior to the end of the session.  Glendy demonstrated good  understanding of the education provided. See Electronic Medical Record under Patient Instructions for exercises provided during therapy sessions    Assessment     Pt presenting with some regression in her status after returning to work and trying to do trial home exercise program.  Increase in her usual tightness that she was previously having from work related movements, positions.  She did well for the most part until work got busy enough that she was less consistent with home exercise " program.  Tight again today but more isolated than last visit.  She would benefit from PT to help get back on track and provide some relief of her c/o.     Glendy Is progressing well towards her goals.   Patient prognosis is Good.     Patient will continue to benefit from skilled outpatient physical therapy to address the deficits listed in the problem list box on initial evaluation, provide pt/family education and to maximize pt's level of independence in the home and community environment.     Patient's spiritual, cultural and educational needs considered and pt agreeable to plan of care and goals.     Anticipated Barriers for therapy: Schedule conflicts, transportation, pain, guarding, tolerance, endurance, posture, postural awareness, joint and soft tissue deficits, chronicity of neck and low back pain.    Goals:   Short-Term: 4 weeks (11/29/2024)      mostly met, progressing  Pt will improve Active cervical flexion to > or = 40* to promote return to prior functional status.  Pt will improve Active cervical side bending to > or = 28* to promote return to prior functional status.  Pt will improve Active lumbar Sidebending  to > or = 12* to promote return to prior functional status.  Pt will demonstrate independence with self correction of pelvic alignment and when to perform to reduce pain and  to promote return to prior functional status.  Pt will demonstrate independence with TOS and ULTT stretches and when to perform to reduce pain and  to promote return to prior functional status.  Pt will improve flexibility of Bilateral  quads, hip flexors, gluts, piriformis, pecs, periscapular, cervical and trunk muscles to help promoted better mobility, posture, alignment, and help return to prior functional status.  Pt will demonstrate improved postural habits as evidenced by improved pelvic alignment,  improved thoracic extension with less forward head posturing, good pelvic alignment with use of heel lift.  Pt will  decrease pain levels < or = to 4-5/10 to hep promote appropriate progression of PT, HEP, and ADL's    Pt will be compliant with new home exercise program to assist with PT intervention and help allow appropriate progression through plan of care.     Long-Term: 10 weeks (12/31/2024)       ongoing  Pt will improve low back, thoracic mobility to WFL to allow return to normal activities of daily living.  Pt will improve cervical strength to > or = 5/5 to allow performance of activities of daily living.  Pt will improve pelvic, trunk and scap-thoracic strength/stabilization to > or = to 4/5 to allow better posture, alignment, and help return to prior functional status.  Pt will have > or = to good flexibility of quads, hip flexors, pecs, neck and periscapula muscles to help promoted better mobility, posture, alignment, and help return to prior functional status.  Pt will demonstrate good postural habits as evidenced by good overall posture and alignment to improve QOL and allow return to prior functional status.  Pt will report < or = 3/10 pain during activities of daily living to improve QOL and allow return to prior functional status.  Pt will be compliant and independent with HEP to allow and maintain better participation in normal activities  Pt will be able to resume normals ADL's, IADL's, previous activities, fitness, and work related tasks     Plan     Continue with: Plan of care Certification: 10/31/2024 to 12/31/2024.    Continue with current POC toward established physical therapy goals.    Herbie Parisi, PT

## 2024-11-13 ENCOUNTER — CLINICAL SUPPORT (OUTPATIENT)
Dept: REHABILITATION | Facility: HOSPITAL | Age: 59
End: 2024-11-13
Payer: COMMERCIAL

## 2024-11-13 DIAGNOSIS — M25.69 BACK STIFFNESS: ICD-10-CM

## 2024-11-13 DIAGNOSIS — M54.2 NECK PAIN: Primary | ICD-10-CM

## 2024-11-13 DIAGNOSIS — M43.6 NECK STIFFNESS: ICD-10-CM

## 2024-11-13 DIAGNOSIS — M54.50 CHRONIC RIGHT-SIDED LOW BACK PAIN WITHOUT SCIATICA: ICD-10-CM

## 2024-11-13 DIAGNOSIS — R29.898 WEAKNESS OF BOTH HIPS: ICD-10-CM

## 2024-11-13 DIAGNOSIS — M62.81 WEAKNESS OF TRUNK MUSCULATURE: ICD-10-CM

## 2024-11-13 DIAGNOSIS — M62.89 QUADRICEP TIGHTNESS: ICD-10-CM

## 2024-11-13 DIAGNOSIS — G89.29 CHRONIC RIGHT-SIDED LOW BACK PAIN WITHOUT SCIATICA: ICD-10-CM

## 2024-11-13 PROCEDURE — 97140 MANUAL THERAPY 1/> REGIONS: CPT | Mod: PN

## 2024-11-13 PROCEDURE — 97110 THERAPEUTIC EXERCISES: CPT | Mod: PN

## 2024-11-13 PROCEDURE — 97014 ELECTRIC STIMULATION THERAPY: CPT | Mod: PN

## 2024-11-13 NOTE — PROGRESS NOTES
[facility]   Physical Therapy Treatment Note      Name: Glendy Andrade  Clinic Number: [id number]    Therapy Diagnosis:   Encounter Diagnoses   Name Primary?    Neck pain Yes    Back stiffness     Neck stiffness     Chronic right-sided low back pain without sciatica     Weakness of trunk musculature     Weakness of both hips     Quadricep tightness      Physician: Ganesh Long MD    Visit Date: 11/13/2024    Physician Orders: PT Eval and Treat   Medical Diagnosis from Referral: Cervical spinal stenosis [M48.02], Lumbar disc herniation [M51.26]   Evaluation Date: 6/4/2024  Authorization Period Expiration: 12/31/2024  Plan of Care Expiration: 12/31/2024  Progress Note Due: 11/29/2024 (Last Progress Note 10/31/2024, 8/2/2024, 7/12/2024)  Visit # / Visits authorized: 21 / 32 +eval  FOTO: Issued Visit #: 1/3, (capture on visit 1, 5, 10) first on 6/4/2024     PTA Visit #: 0/5     Precautions: Standard, ADD, depression, history of MRSA infection, inflammatory spondylopathy, DISH (diffuse idiopathic skeletal hyperostosis), insomnia, possible diagnosis of psoriatic arthritis and autoimmune disorder,  MRI which shows cervical stenosis, possible cord signal change (possible need for decompression and fusion surgery).  right L4/5 protrusion in addition to canal stenosis at that level      Time In: 9:45  Time Out:  10:30  Total Time: 45 minutes  Total Billable Time: 40 minutes      Subjective     Patient reports: she had some increased c/o yesterday for some reason.  Not sure why.  Took it easy and did take a muscle relaxer.  Seemed to help as she went from 8/10 yesterday to 2/10 today..    She was limited compliant with home exercise program.  Response to previous treatment:  felt better  Functional change:  doing everything but just having less pain, not hesitating or thinking about it as much    Pain: 2/10 pain Right side of ribs and neck.    (Was 8/10 yesterday)  Location: occiput, cerv  paraspinals, UT, levator scap    Objective    Last Progress Note 10/31/2024  Posture/alignment:    Sitting:  with mildly slouched posture, increased forward head posture.  Right scapula lower vs Left with Right Sidebending of trunk.     Standing:  with forward head posture, Right scapula and iliac crest resting lower vs Left side.     Supine:  Right sided pelvic downslip with AI.  ~ 6 mm Right leg length discrepancy.     Gait:  walking with no obvious antalgic gait.  Step down sign Right Lower Extremity into Right stance phase due to Right leg length discrepancy.     Cervical AROM:           Flexion                                           42 was 38   Extension                      WNL (decreased mid to upper thoracic)      Left      Right       Rotation  70  70   Sidebending  28  27 was 25      Cervical Strength:          Flexion                       5-/5   Extension                       5-/5      Left  Right   Rotation  5-/5 was 4/5  5-/5 was 4/5   Sidebending  5-/5  5-/5      Shoulder ROM:    Left AROM  Right AROM    Flexion  WNL  WNL   Abduction  WNL  WNL   (Restricted Right scapula rotation, rib and scap-thoracic restrictions)     Lumbar ROM:     AROM    Flexion  WNL   Extension  WNL   Left Sidebending  10   Right Sidebending  10      Special Tests:     Cervical Radiculopathy     Left  Right   Spurling's Test (A-sidebend) negative    positive      Cervical:     (+)/(-)   Compression negative      Distraction negative         Dermatomes:  upper and lower quarters intact to light touch     Myotomes:    5-/5 Bilateral C7 and T1  Otherwise upper and lower quarter 5/5      strength:  Right=  46 lbs    Left=  40 lbs     Joint Mobility:    Cervical:  some mid but mostly lower cervical, CTJ, and upper thoracic restrictions  Thoracic motion/mobility, scap-thoracic, ribs:  gross stiffness in thoracic region     Hip range of motion/mobility:  Pt positioned in sidelying with hip extension mobility/flexibility R= -7  was -15, L= neutral was -3.                                                       Left                              R  Hip External Rotation:             5-/5                             5-/5  Hip abduction:                         4/5 was 4-/5                4-/5 was 3+/5  Hip extension:                         4/5 was 3+/5                4/5 was 4-/5     Flexibility:  Decreased flexibility noted Right>Left rectus femoris, vastus lateralis, tensor fascia latae, Iliotibial Band.  Right Quadratus Lumborum, long thoracolumbar paravertebral muscles.  Pecs, suboccipitals.      Treatment     Glendy received the treatments listed below:      manual therapy techniques: Joint mobilizations, Myofacial release, and Soft tissue Mobilization were applied to the: neck and low back for 15 minutes, including:    STM trapezius, levator scap, rhomboids bilaterally  Suboccipital release, STM along cervical paraspinals  Education on self treatment with tennis balls in a sock for suboccipital release  NP--supine mobilization of lower cervical, side glides and down glides  Prone CTJ and upper T-spine P-A mobs (mixed some AROM neck extension to assess mobility)    -Supine and sidelying trigger point and IASTM release of Right>Left rectus femoris, vastus lateralis, tensor fascia latae, Iliotibial Band with intermittent stretching  -Supine muscle energy technique correction of Right sided pelvic downslip with AI f/b shotgun technique  -sidelying opening technique to Right Quadratus Lumborum and longer paravertebral muscles with trigger point release  -rib area of greatest restriction (step forward on Left side, back on Right side) started on Left and then progressed to Right  - thoracic Sidebending     STM along B UT cervical paraspinals, suboccipital release  1st rib mobilization (gentle) bilaterally    NP--Dry needling:  The pt expressed desire to receive trigger point dry needling today. Informed and written consent was obtained  "including benefits and risks of treatments, copy of signed consent form will be placed in patient's medical record. Verification of pt name, , and site of treatment was performed prior to treatment. Pt was positioned in sidelying to receive trigger point dry needling to Right Quadratus Lumborum with 75 mm needles and lumbar paravertebral muscles L3 and L5 with 40 mm serin J type needles. LTR achieved and pt reported familiar pain reproduction. Electro dry needling x 10 minutes.  The pt did not have adverse response to treatment. This was followed by STM to further decrease trigger point activity and pain, patient tolerated well. Post treatment patient demonstrated decreased pain per report following. Pt was educated to drink plenty of water following treatment to help with muscle soreness. Pt was educated to contact therapist post treatment as needed.     MHP and VMS NMES to Right Quadratus Lumborum, latissimus dorsi, lower trap, rhomboids x 15'     therapeutic exercises to develop strength, endurance, ROM, flexibility, posture, and core stabilization for  15 minutes including:  Thoracic extension foam rolling  Pec stretching with foam roll with hands behind head  Snow ayla with foam roll   Supine double knee to chest x 5  Supine fig 4 knees to chest x 5 each   Supine single leg lower trunk rotation x 5, Right Lower Extremity only  - prone scap retract 10 x 5" bilateral  - prone scap retract + W lift, 10 x 5" hold   Supine on towel--hands behind head pec stretch + chin tuck (fingers assist with subocc release) 5" hold x 10 reps    BELOW NOT PERFORMED  (NP) Self IASTM release to Right>Left thigh  Self muscle energy technique (Push/pull without stick) correction of Right sided pelvic downslip with AI  Sidelying Quadratus Lumborum + lat stretch with opening technique x 2'  Supine cervical nod 10 x 5" - reviewed use of tennis balls for home  Supine cervical chin tuck + neck flexion hold (for deep neck flexor " "strength) 10 x 5-10s  (extremely challenging)  Thoracic extension + pec stretch with mini snow ayla x 20    Standing with back to wall  Upright posture with chin tuck  Upright posture + chin tuck + shoulder abduction    neuromuscular re-education activities to improve: Coordination, Kinesthetic, Proprioception, Posture, and Motor Control for 00 minutes. The following activities were included:  Cervical:  NP- Cat cow x 20  NP- thread the needle quadruped bilateral x 15  - prone chin tuck with retraction (prone on elbows) with YTB around head x 15 5s hold  NP-- cervical isometrics SB,, rotation, flexion, extension bilaterally 10x each with 3 s hold - cues for neutral cervical posture  NP-- Row YTB 2 x 10    BELOW NOT PERFORMED  Lumbar:   PPT 10 x 5", mod cues initially for proper activation  PPT + ball squeeze 10 x 5"  PPT + glut set into bridges 2 x 10  PPT +  clam with G loop x 20  PPT + clam with G loop into bridges     therapeutic activities to improve functional performance for 00 minutes, including:  Education on use of cervical roll along pillow edge for neck support  Anatomy and postural education on proper neck alignment at rest and with functional activities to avoid over straining and poor postural mechanics   May add at later date      Patient Education and Home Exercises       Education provided:   - avoidance of painful movements as able  - consistent work of stretching, exercises, and HEP  - increasing postural awareness throughout day with tasks and activities     Written Home Exercises Provided:  Instructed patient to continue current home exercise program.  Exercises were reviewed and Glendy was able to demonstrate them prior to the end of the session.  Glendy demonstrated good  understanding of the education provided. See Electronic Medical Record under Patient Instructions for exercises provided during therapy sessions    Assessment     Pt presenting with continued c/o along the Right side of mid to " lower back but better than the weekend.  C/o still along Right Quadratus Lumborum and Right side paravertebral muscles.  Improved after manual therapy and VMS allowing for better results with stretching and other exercises.  She would benefit from PT to help get back on track and provide some relief of her c/o.     Glendy Is progressing well towards her goals.   Patient prognosis is Good.     Patient will continue to benefit from skilled outpatient physical therapy to address the deficits listed in the problem list box on initial evaluation, provide pt/family education and to maximize pt's level of independence in the home and community environment.     Patient's spiritual, cultural and educational needs considered and pt agreeable to plan of care and goals.     Anticipated Barriers for therapy: Schedule conflicts, transportation, pain, guarding, tolerance, endurance, posture, postural awareness, joint and soft tissue deficits, chronicity of neck and low back pain.    Goals:   Short-Term: 4 weeks (11/29/2024)      mostly met, progressing  Pt will improve Active cervical flexion to > or = 40* to promote return to prior functional status.  Pt will improve Active cervical side bending to > or = 28* to promote return to prior functional status.  Pt will improve Active lumbar Sidebending  to > or = 12* to promote return to prior functional status.  Pt will demonstrate independence with self correction of pelvic alignment and when to perform to reduce pain and  to promote return to prior functional status.  Pt will demonstrate independence with TOS and ULTT stretches and when to perform to reduce pain and  to promote return to prior functional status.  Pt will improve flexibility of Bilateral  quads, hip flexors, gluts, piriformis, pecs, periscapular, cervical and trunk muscles to help promoted better mobility, posture, alignment, and help return to prior functional status.  Pt will demonstrate improved postural habits as  evidenced by improved pelvic alignment,  improved thoracic extension with less forward head posturing, good pelvic alignment with use of heel lift.  Pt will decrease pain levels < or = to 4-5/10 to hep promote appropriate progression of PT, HEP, and ADL's    Pt will be compliant with new home exercise program to assist with PT intervention and help allow appropriate progression through plan of care.     Long-Term: 10 weeks (12/31/2024)       ongoing  Pt will improve low back, thoracic mobility to WFL to allow return to normal activities of daily living.  Pt will improve cervical strength to > or = 5/5 to allow performance of activities of daily living.  Pt will improve pelvic, trunk and scap-thoracic strength/stabilization to > or = to 4/5 to allow better posture, alignment, and help return to prior functional status.  Pt will have > or = to good flexibility of quads, hip flexors, pecs, neck and periscapula muscles to help promoted better mobility, posture, alignment, and help return to prior functional status.  Pt will demonstrate good postural habits as evidenced by good overall posture and alignment to improve QOL and allow return to prior functional status.  Pt will report < or = 3/10 pain during activities of daily living to improve QOL and allow return to prior functional status.  Pt will be compliant and independent with HEP to allow and maintain better participation in normal activities  Pt will be able to resume normals ADL's, IADL's, previous activities, fitness, and work related tasks     Plan     Continue with: Plan of care Certification: 10/31/2024 to 12/31/2024.    Continue with current POC toward established physical therapy goals.    Herbie Parisi, PT

## 2024-11-21 ENCOUNTER — CLINICAL SUPPORT (OUTPATIENT)
Dept: REHABILITATION | Facility: HOSPITAL | Age: 59
End: 2024-11-21
Payer: COMMERCIAL

## 2024-11-21 DIAGNOSIS — M54.2 NECK PAIN: Primary | ICD-10-CM

## 2024-11-21 DIAGNOSIS — M25.69 BACK STIFFNESS: ICD-10-CM

## 2024-11-21 DIAGNOSIS — G89.29 CHRONIC RIGHT-SIDED LOW BACK PAIN WITHOUT SCIATICA: ICD-10-CM

## 2024-11-21 DIAGNOSIS — R29.898 WEAKNESS OF BOTH HIPS: ICD-10-CM

## 2024-11-21 DIAGNOSIS — M43.6 NECK STIFFNESS: ICD-10-CM

## 2024-11-21 DIAGNOSIS — M62.81 WEAKNESS OF TRUNK MUSCULATURE: ICD-10-CM

## 2024-11-21 DIAGNOSIS — M54.50 CHRONIC RIGHT-SIDED LOW BACK PAIN WITHOUT SCIATICA: ICD-10-CM

## 2024-11-21 DIAGNOSIS — M62.89 QUADRICEP TIGHTNESS: ICD-10-CM

## 2024-11-21 PROCEDURE — 97110 THERAPEUTIC EXERCISES: CPT | Mod: PN

## 2024-11-21 NOTE — PROGRESS NOTES
[facility]   Physical Therapy Treatment Note      Name: Glendy Andrade  Clinic Number: [id number]    Therapy Diagnosis:   Encounter Diagnoses   Name Primary?    Neck pain Yes    Back stiffness     Neck stiffness     Chronic right-sided low back pain without sciatica     Weakness of trunk musculature     Weakness of both hips     Quadricep tightness      Physician: Ganesh Long MD    Visit Date: 11/21/2024    Physician Orders: PT Eval and Treat   Medical Diagnosis from Referral: Cervical spinal stenosis [M48.02], Lumbar disc herniation [M51.26]   Evaluation Date: 6/4/2024  Authorization Period Expiration: 12/31/2024  Plan of Care Expiration: 12/31/2024  Progress Note Due: 11/29/2024 (Last Progress Note 10/31/2024, 8/2/2024, 7/12/2024)  Visit # / Visits authorized: 22 / 32 +eval  FOTO: Issued Visit #: 1/3, (capture on visit 1, 5, 10) first on 6/4/2024     PTA Visit #: 0/5     Precautions: Standard, ADD, depression, history of MRSA infection, inflammatory spondylopathy, DISH (diffuse idiopathic skeletal hyperostosis), insomnia, possible diagnosis of psoriatic arthritis and autoimmune disorder,  MRI which shows cervical stenosis, possible cord signal change (possible need for decompression and fusion surgery).  right L4/5 protrusion in addition to canal stenosis at that level      Time In: 11:15  Time Out:  12:00  Total Time: 45 minutes  Total Billable Time: 40 minutes      Subjective     Patient reports: she had been doing really well since her last and seemed to be able to self manage her symptoms.  However she was doing a lot of leaning over and working from home this morning with increased mid to lower back tightness.  Really like going through some exercises this morning that helped to target and address her c/o so she knows what to do to help address different c/o on her own with home exercise program.  Feeling more confident about being able to address different aches related to work  movements on her own so she can eventually discharge to home exercise program.    She was limited compliant with home exercise program.  Response to previous treatment:  felt better  Functional change:  doing everything but just having less pain, not hesitating or thinking about it as much    Pain: 2/10 pain Right side of ribs and neck.    (Was 8/10 yesterday)  Location: occiput, cerv paraspinals, UT, levator scap    Objective    Last Progress Note 10/31/2024  Posture/alignment:    Sitting:  with mildly slouched posture, increased forward head posture.  Right scapula lower vs Left with Right Sidebending of trunk.     Standing:  with forward head posture, Right scapula and iliac crest resting lower vs Left side.     Supine:  Right sided pelvic downslip with AI.  ~ 6 mm Right leg length discrepancy.     Gait:  walking with no obvious antalgic gait.  Step down sign Right Lower Extremity into Right stance phase due to Right leg length discrepancy.     Cervical AROM:           Flexion                                           42 was 38   Extension                      WNL (decreased mid to upper thoracic)      Left      Right       Rotation  70  70   Sidebending  28  27 was 25      Cervical Strength:          Flexion                       5-/5   Extension                       5-/5      Left  Right   Rotation  5-/5 was 4/5  5-/5 was 4/5   Sidebending  5-/5  5-/5      Shoulder ROM:    Left AROM  Right AROM    Flexion  WNL  WNL   Abduction  WNL  WNL   (Restricted Right scapula rotation, rib and scap-thoracic restrictions)     Lumbar ROM:     AROM    Flexion  WNL   Extension  WNL   Left Sidebending  10   Right Sidebending  10      Special Tests:     Cervical Radiculopathy     Left  Right   Spurling's Test (A-sidebend) negative    positive      Cervical:     (+)/(-)   Compression negative      Distraction negative         Dermatomes:  upper and lower quarters intact to light touch     Myotomes:    5-/5 Bilateral C7 and  T1  Otherwise upper and lower quarter 5/5      strength:  Right=  46 lbs    Left=  40 lbs     Joint Mobility:    Cervical:  some mid but mostly lower cervical, CTJ, and upper thoracic restrictions  Thoracic motion/mobility, scap-thoracic, ribs:  gross stiffness in thoracic region     Hip range of motion/mobility:  Pt positioned in sidelying with hip extension mobility/flexibility R= -7 was -15, L= neutral was -3.                                                       Left                              R  Hip External Rotation:             5-/5                             5-/5  Hip abduction:                         4/5 was 4-/5                4-/5 was 3+/5  Hip extension:                         4/5 was 3+/5                4/5 was 4-/5     Flexibility:  Decreased flexibility noted Right>Left rectus femoris, vastus lateralis, tensor fascia latae, Iliotibial Band.  Right Quadratus Lumborum, long thoracolumbar paravertebral muscles.  Pecs, suboccipitals.      Treatment     Glendy received the treatments listed below:      manual therapy techniques: Joint mobilizations, Myofacial release, and Soft tissue Mobilization were applied to the: neck and low back for 00 minutes, including:    STM trapezius, levator scap, rhomboids bilaterally  Suboccipital release, STM along cervical paraspinals  Education on self treatment with tennis balls in a sock for suboccipital release  NP--supine mobilization of lower cervical, side glides and down glides  Prone CTJ and upper T-spine P-A mobs (mixed some AROM neck extension to assess mobility)    -Supine and sidelying trigger point and IASTM release of Right>Left rectus femoris, vastus lateralis, tensor fascia latae, Iliotibial Band with intermittent stretching  -Supine muscle energy technique correction of Right sided pelvic downslip with AI f/b shotgun technique  -sidelying opening technique to Right Quadratus Lumborum and longer paravertebral muscles with trigger point release  -rib  "area of greatest restriction (step forward on Left side, back on Right side) started on Left and then progressed to Right  - thoracic Sidebending     STM along B UT cervical paraspinals, suboccipital release  1st rib mobilization (gentle) bilaterally    Dry needling:  The pt expressed desire to receive trigger point dry needling today. Informed and written consent was obtained including benefits and risks of treatments, copy of signed consent form will be placed in patient's medical record. Verification of pt name, , and site of treatment was performed prior to treatment. Pt was positioned in sidelying to receive trigger point dry needling to Right Quadratus Lumborum with 75 mm needles and lumbar paravertebral muscles L3 and L5 with 40 mm serin J type needles. LTR achieved and pt reported familiar pain reproduction. Electro dry needling x 10 minutes.  The pt did not have adverse response to treatment. This was followed by STM to further decrease trigger point activity and pain, patient tolerated well. Post treatment patient demonstrated decreased pain per report following. Pt was educated to drink plenty of water following treatment to help with muscle soreness. Pt was educated to contact therapist post treatment as needed.     MHP and VMS NMES to Right Quadratus Lumborum, latissimus dorsi, lower trap, rhomboids x 00'     therapeutic exercises to develop strength, endurance, ROM, flexibility, posture, and core stabilization for  40 minutes including:  Kneeling child's pose and progression  Standing child's pose and progression  Thoracic extension foam rolling  Pec stretching with foam roll with hands behind head  Snow ayla with foam roll   Supine double knee to chest x 5  Supine fig 4 knees to chest x 5 each   Supine single leg lower trunk rotation x 5, Right Lower Extremity only  - prone scap retract 10 x 5" bilateral  - prone scap retract + W lift, 10 x 5" hold   Prone rocketman  Prone rocketman fly  Supine on " "towel--hands behind head pec stretch + chin tuck (fingers assist with subocc release) 5" hold x 10 reps    BELOW NOT PERFORMED  (NP) Self IASTM release to Right>Left thigh  Self muscle energy technique (Push/pull without stick) correction of Right sided pelvic downslip with AI  Sidelying Quadratus Lumborum + lat stretch with opening technique x 2'  Supine cervical nod 10 x 5" - reviewed use of tennis balls for home  Supine cervical chin tuck + neck flexion hold (for deep neck flexor strength) 10 x 5-10s  (extremely challenging)  Thoracic extension + pec stretch with mini snow ayla x 20    Standing with back to wall  Upright posture with chin tuck  Upright posture + chin tuck + shoulder abduction    neuromuscular re-education activities to improve: Coordination, Kinesthetic, Proprioception, Posture, and Motor Control for 00 minutes. The following activities were included:  Prone:  Rocketman + chin tuck    BELOW NOT PERFORMED  Lumbar:   PPT 10 x 5", mod cues initially for proper activation  PPT + ball squeeze 10 x 5"  PPT + glut set into bridges 2 x 10  PPT +  clam with G loop x 20  PPT + clam with G loop into bridges     therapeutic activities to improve functional performance for 00 minutes, including:  Education on use of cervical roll along pillow edge for neck support  Anatomy and postural education on proper neck alignment at rest and with functional activities to avoid over straining and poor postural mechanics   May add at later date      Patient Education and Home Exercises       Education provided:   - avoidance of painful movements as able  - consistent work of stretching, exercises, and HEP  - increasing postural awareness throughout day with tasks and activities     Written Home Exercises Provided:  Instructed patient to continue current home exercise program.  Exercises were reviewed and Glendy was able to demonstrate them prior to the end of the session.  Glendy demonstrated good  understanding of the " education provided. See Electronic Medical Record under Patient Instructions for exercises provided during therapy sessions    Assessment     Pt presenting with continued c/o along the Right side of mid to lower back but better than the weekend.  C/o still along Right Quadratus Lumborum and Right side paravertebral muscles.  Improved after manual therapy and VMS allowing for better results with stretching and other exercises.  She would benefit from PT to help get back on track and provide some relief of her c/o.     Glendy Is progressing well towards her goals.   Patient prognosis is Good.     Patient will continue to benefit from skilled outpatient physical therapy to address the deficits listed in the problem list box on initial evaluation, provide pt/family education and to maximize pt's level of independence in the home and community environment.     Patient's spiritual, cultural and educational needs considered and pt agreeable to plan of care and goals.     Anticipated Barriers for therapy: Schedule conflicts, transportation, pain, guarding, tolerance, endurance, posture, postural awareness, joint and soft tissue deficits, chronicity of neck and low back pain.    Goals:   Short-Term: 4 weeks (11/29/2024)      mostly met, progressing  Pt will improve Active cervical flexion to > or = 40* to promote return to prior functional status.  Pt will improve Active cervical side bending to > or = 28* to promote return to prior functional status.  Pt will improve Active lumbar Sidebending  to > or = 12* to promote return to prior functional status.  Pt will demonstrate independence with self correction of pelvic alignment and when to perform to reduce pain and  to promote return to prior functional status.  Pt will demonstrate independence with TOS and ULTT stretches and when to perform to reduce pain and  to promote return to prior functional status.  Pt will improve flexibility of Bilateral  quads, hip flexors, gluts,  piriformis, pecs, periscapular, cervical and trunk muscles to help promoted better mobility, posture, alignment, and help return to prior functional status.  Pt will demonstrate improved postural habits as evidenced by improved pelvic alignment,  improved thoracic extension with less forward head posturing, good pelvic alignment with use of heel lift.  Pt will decrease pain levels < or = to 4-5/10 to hep promote appropriate progression of PT, HEP, and ADL's    Pt will be compliant with new home exercise program to assist with PT intervention and help allow appropriate progression through plan of care.     Long-Term: 10 weeks (12/31/2024)       ongoing  Pt will improve low back, thoracic mobility to WFL to allow return to normal activities of daily living.  Pt will improve cervical strength to > or = 5/5 to allow performance of activities of daily living.  Pt will improve pelvic, trunk and scap-thoracic strength/stabilization to > or = to 4/5 to allow better posture, alignment, and help return to prior functional status.  Pt will have > or = to good flexibility of quads, hip flexors, pecs, neck and periscapula muscles to help promoted better mobility, posture, alignment, and help return to prior functional status.  Pt will demonstrate good postural habits as evidenced by good overall posture and alignment to improve QOL and allow return to prior functional status.  Pt will report < or = 3/10 pain during activities of daily living to improve QOL and allow return to prior functional status.  Pt will be compliant and independent with HEP to allow and maintain better participation in normal activities  Pt will be able to resume normals ADL's, IADL's, previous activities, fitness, and work related tasks     Plan     Continue with: Plan of care Certification: 10/31/2024 to 12/31/2024.    Continue with current POC toward established physical therapy goals.    Herbie Parisi, PT

## 2024-11-24 NOTE — PROGRESS NOTES
[facility]   Physical Therapy Treatment Note      Name: Glendy Andrade  Clinic Number: [id number]    Therapy Diagnosis:   Encounter Diagnoses   Name Primary?    Neck pain Yes    Back stiffness     Neck stiffness     Chronic right-sided low back pain without sciatica     Weakness of trunk musculature     Weakness of both hips     Quadricep tightness        Physician: Ganesh Long MD    Visit Date: 11/25/2024    Physician Orders: PT Eval and Treat   Medical Diagnosis from Referral: Cervical spinal stenosis [M48.02], Lumbar disc herniation [M51.26]   Evaluation Date: 6/4/2024  Authorization Period Expiration: 12/31/2024  Plan of Care Expiration: 12/31/2024  Progress Note Due: 12/31/2024 (Last Progress Note 11/25/2024, 10/31/2024, 8/2/2024, 7/12/2024)  Visit # / Visits authorized: 23 / 32 +eval  FOTO: Issued Visit #: 1/3, (capture on visit 1, 5, 10) first on 6/4/2024     PTA Visit #: 0/5     Precautions: Standard, ADD, depression, history of MRSA infection, inflammatory spondylopathy, DISH (diffuse idiopathic skeletal hyperostosis), insomnia, possible diagnosis of psoriatic arthritis and autoimmune disorder,  MRI which shows cervical stenosis, possible cord signal change (possible need for decompression and fusion surgery).  right L4/5 protrusion in addition to canal stenosis at that level      Time In:  9:00  Time Out:  9:45  Total Time: 45 minutes  Total Billable Time: 45 minutes      Subjective     Patient reports: she had been doing really well since her last and seemed to be able to self manage her symptoms.  Really liked going through some exercises this morning and last visit that helped to target and address her c/o so she knows what to do to help address different c/o on her own with home exercise program.  Feeling more confident about being able to address different aches related to work movements on her own so she can eventually discharge to home exercise program.    She was  limited compliant with home exercise program.  Response to previous treatment:  felt better  Functional change:  doing everything but just having less pain, not hesitating or thinking about it as much    Pain: 3-4/10 pain Right side of ribs and neck.    0/10 in low back  Location: occiput, cerv paraspinals, UT, levator scap    Objective        Posture/alignment:    Sitting:  with mildly slouched posture, increased forward head posture.  Right scapula lower vs Left with Right Sidebending of trunk.     Standing:  with forward head posture, Right scapula and iliac crest resting lower vs Left side. (Moderately better)     Supine:  Right sided pelvic downslip with AI.  ~ 6 mm Right leg length discrepancy.     Gait:  walking with no obvious antalgic gait.  Step down sign Right Lower Extremity into Right stance phase due to Right leg length discrepancy.     Cervical AROM:           Flexion                                           44 was 38   Extension                      WNL (decreased mid to upper thoracic)      Left      Right       Rotation  70  70   Sidebending  28  28 was 25      Cervical Strength:          Flexion                       5-/5   Extension                       5-/5      Left  Right   Rotation  5-/5 was 4/5  5-/5 was 4/5   Sidebending  5-/5  5-/5      Shoulder ROM:    Left AROM  Right AROM    Flexion  WNL  WNL   Abduction  WNL  WNL   (Restricted Right scapula rotation, rib and scap-thoracic restrictions)     Lumbar ROM:     AROM    Flexion  WNL   Extension  WNL   Left Sidebending  14 was 10   Right Sidebending  15 was 10      Special Tests:     Cervical Radiculopathy     Left  Right   Spurling's Test (A-sidebend) negative    Positive (mild now)      Cervical:     (+)/(-)   Compression negative      Distraction negative         Dermatomes:  upper and lower quarters intact to light touch     Myotomes:    5/5 Bilateral T1 but still 5-/5 C7, (was 5-/5 Bilateral C7 and T1)  Otherwise upper and lower quarter  5/5      strength:  Right=  46 lbs    Left=  42 was 40 lbs     Joint Mobility:    Cervical:  some mid but mostly lower cervical, CTJ, and upper thoracic restrictions  Thoracic motion/mobility, scap-thoracic, ribs:  gross stiffness in thoracic region     Hip range of motion/mobility:  Pt positioned in sidelying with hip extension mobility/flexibility R= -7 was -15, L= neutral was -3.                                                       Left                              R  Hip External Rotation:             5-/5                             5-/5  Hip abduction:                         4/5 was 4-/5                4-/5 was 3+/5  Hip extension:                         4/5 was 3+/5                4/5 was 4-/5     Flexibility:  Decreased flexibility noted Right>Left rectus femoris, vastus lateralis, tensor fascia latae, Iliotibial Band.  Right Quadratus Lumborum, long thoracolumbar paravertebral muscles.  Pecs, suboccipitals.      Treatment     Glendy received the treatments listed below:      manual therapy techniques: Joint mobilizations, Myofacial release, and Soft tissue Mobilization were applied to the: neck and low back for 10 minutes, including:    STM trapezius, levator scap, rhomboids bilaterally  Suboccipital release, STM along cervical paraspinals  Education on self treatment with tennis balls in a sock for suboccipital release  NP--supine mobilization of lower cervical, side glides and down glides  Prone CTJ and upper T-spine P-A mobs (mixed some AROM neck extension to assess mobility)    -Supine and sidelying trigger point and IASTM release of Right>Left rectus femoris, vastus lateralis, tensor fascia latae, Iliotibial Band with intermittent stretching  -Supine muscle energy technique correction of Right sided pelvic downslip with AI f/b shotgun technique  -sidelying opening technique to Right Quadratus Lumborum and longer paravertebral muscles with trigger point release  -rib area of greatest restriction  "(step forward on Left side, back on Right side) started on Left and then progressed to Right  - thoracic Sidebending     STM along B UT cervical paraspinals, suboccipital release  1st rib mobilization (gentle) bilaterally    NP--Dry needling:  The pt expressed desire to receive trigger point dry needling today. Informed and written consent was obtained including benefits and risks of treatments, copy of signed consent form will be placed in patient's medical record. Verification of pt name, , and site of treatment was performed prior to treatment. Pt was positioned in sidelying to receive trigger point dry needling to Right Quadratus Lumborum with 75 mm needles and lumbar paravertebral muscles L3 and L5 with 40 mm serin J type needles. LTR achieved and pt reported familiar pain reproduction. Electro dry needling x 10 minutes.  The pt did not have adverse response to treatment. This was followed by STM to further decrease trigger point activity and pain, patient tolerated well. Post treatment patient demonstrated decreased pain per report following. Pt was educated to drink plenty of water following treatment to help with muscle soreness. Pt was educated to contact therapist post treatment as needed.     MHP and VMS NMES to Right Quadratus Lumborum, latissimus dorsi, lower trap, rhomboids x 00'     therapeutic exercises to develop strength, endurance, ROM, flexibility, posture, and core stabilization for  35 minutes including:  Kneeling child's pose and progression  Standing child's pose and progression  Thoracic extension foam rolling  Pec stretching with foam roll with hands behind head  Snow ayla with foam roll   Supine double knee to chest x 5  Supine fig 4 knees to chest x 5 each   Supine single leg lower trunk rotation x 5, Right Lower Extremity only  - prone scap retract 10 x 5" bilateral  - prone scap retract + W lift, 10 x 5" hold   Prone rocketman  Prone rocketman fly  Supine on towel--hands behind head " "pec stretch + chin tuck (fingers assist with subocc release) 5" hold x 10 reps    BELOW NOT PERFORMED  (NP) Self IASTM release to Right>Left thigh  Self muscle energy technique (Push/pull without stick) correction of Right sided pelvic downslip with AI  Sidelying Quadratus Lumborum + lat stretch with opening technique x 2'  Supine cervical nod 10 x 5" - reviewed use of tennis balls for home  Supine cervical chin tuck + neck flexion hold (for deep neck flexor strength) 10 x 5-10s  (extremely challenging)  Thoracic extension + pec stretch with mini snow ayla x 20    Standing with back to wall  Upright posture with chin tuck  Upright posture + chin tuck + shoulder abduction    neuromuscular re-education activities to improve: Coordination, Kinesthetic, Proprioception, Posture, and Motor Control for 00 minutes. The following activities were included:  Prone:  Rocketman + chin tuck    BELOW NOT PERFORMED  Lumbar:   PPT 10 x 5", mod cues initially for proper activation  PPT + ball squeeze 10 x 5"  PPT + glut set into bridges 2 x 10  PPT +  clam with G loop x 20  PPT + clam with G loop into bridges     therapeutic activities to improve functional performance for 00 minutes, including:  Education on use of cervical roll along pillow edge for neck support  Anatomy and postural education on proper neck alignment at rest and with functional activities to avoid over straining and poor postural mechanics   May add at later date      Patient Education and Home Exercises       Education provided:   - avoidance of painful movements as able  - consistent work of stretching, exercises, and HEP  - increasing postural awareness throughout day with tasks and activities     Written Home Exercises Provided:  Instructed patient to continue current home exercise program.  Exercises were reviewed and Glendy was able to demonstrate them prior to the end of the session.  Glendy demonstrated good  understanding of the education provided. See " Electronic Medical Record under Patient Instructions for exercises provided during therapy sessions    Assessment     Pt presenting with continued c/o along the Right side of mid to lower back but better than last week.  C/o still along Right Quadratus Lumborum and Right side paravertebral muscles.  Some Right sided rib restrictions along with some muscular tightness.   Improved after manual therapy allowing for better results with stretching and other exercises.  She would benefit from PT to help get back on track and provide some relief of her c/o.     Glendy Is progressing well towards her goals.   Patient prognosis is Good.     Patient will continue to benefit from skilled outpatient physical therapy to address the deficits listed in the problem list box on initial evaluation, provide pt/family education and to maximize pt's level of independence in the home and community environment.     Patient's spiritual, cultural and educational needs considered and pt agreeable to plan of care and goals.     Anticipated Barriers for therapy: Schedule conflicts, transportation, pain, guarding, tolerance, endurance, posture, postural awareness, joint and soft tissue deficits, chronicity of neck and low back pain.    Goals:   Short-Term: 4 weeks (11/29/2024)      met  Pt will improve Active cervical flexion to > or = 40* to promote return to prior functional status.  Pt will improve Active cervical side bending to > or = 28* to promote return to prior functional status.  Pt will improve Active lumbar Sidebending  to > or = 12* to promote return to prior functional status.  Pt will demonstrate independence with self correction of pelvic alignment and when to perform to reduce pain and  to promote return to prior functional status.  Pt will demonstrate independence with TOS and ULTT stretches and when to perform to reduce pain and  to promote return to prior functional status.  Pt will improve flexibility of Bilateral  quads,  hip flexors, gluts, piriformis, pecs, periscapular, cervical and trunk muscles to help promoted better mobility, posture, alignment, and help return to prior functional status.  Pt will demonstrate improved postural habits as evidenced by improved pelvic alignment,  improved thoracic extension with less forward head posturing, good pelvic alignment with use of heel lift.  Pt will decrease pain levels < or = to 4-5/10 to hep promote appropriate progression of PT, HEP, and ADL's    Pt will be compliant with new home exercise program to assist with PT intervention and help allow appropriate progression through plan of care.     Long-Term: 10 weeks (12/31/2024)       ongoing  Pt will improve low back, thoracic mobility to WFL to allow return to normal activities of daily living.  Pt will improve cervical strength to > or = 5/5 to allow performance of activities of daily living.  Pt will improve pelvic, trunk and scap-thoracic strength/stabilization to > or = to 4/5 to allow better posture, alignment, and help return to prior functional status.  Pt will have > or = to good flexibility of quads, hip flexors, pecs, neck and periscapula muscles to help promoted better mobility, posture, alignment, and help return to prior functional status.  Pt will demonstrate good postural habits as evidenced by good overall posture and alignment to improve QOL and allow return to prior functional status.  Pt will report < or = 3/10 pain during activities of daily living to improve QOL and allow return to prior functional status.  Pt will be compliant and independent with HEP to allow and maintain better participation in normal activities  Pt will be able to resume normals ADL's, IADL's, previous activities, fitness, and work related tasks     Plan     Continue with: Plan of care Certification: 10/31/2024 to 12/31/2024.    Continue with current POC toward established physical therapy goals.    Herbie Parisi, PT

## 2024-11-25 ENCOUNTER — CLINICAL SUPPORT (OUTPATIENT)
Dept: REHABILITATION | Facility: HOSPITAL | Age: 59
End: 2024-11-25
Payer: COMMERCIAL

## 2024-11-25 DIAGNOSIS — M62.81 WEAKNESS OF TRUNK MUSCULATURE: ICD-10-CM

## 2024-11-25 DIAGNOSIS — R29.898 WEAKNESS OF BOTH HIPS: ICD-10-CM

## 2024-11-25 DIAGNOSIS — G89.29 CHRONIC RIGHT-SIDED LOW BACK PAIN WITHOUT SCIATICA: ICD-10-CM

## 2024-11-25 DIAGNOSIS — M43.6 NECK STIFFNESS: ICD-10-CM

## 2024-11-25 DIAGNOSIS — M54.50 CHRONIC RIGHT-SIDED LOW BACK PAIN WITHOUT SCIATICA: ICD-10-CM

## 2024-11-25 DIAGNOSIS — M54.2 NECK PAIN: Primary | ICD-10-CM

## 2024-11-25 DIAGNOSIS — M25.69 BACK STIFFNESS: ICD-10-CM

## 2024-11-25 DIAGNOSIS — M62.89 QUADRICEP TIGHTNESS: ICD-10-CM

## 2024-11-25 PROCEDURE — 97140 MANUAL THERAPY 1/> REGIONS: CPT | Mod: PN

## 2024-11-25 PROCEDURE — 97110 THERAPEUTIC EXERCISES: CPT | Mod: PN

## 2024-12-03 ENCOUNTER — CLINICAL SUPPORT (OUTPATIENT)
Dept: REHABILITATION | Facility: HOSPITAL | Age: 59
End: 2024-12-03
Payer: COMMERCIAL

## 2024-12-03 DIAGNOSIS — M54.50 CHRONIC RIGHT-SIDED LOW BACK PAIN WITHOUT SCIATICA: ICD-10-CM

## 2024-12-03 DIAGNOSIS — M25.69 BACK STIFFNESS: ICD-10-CM

## 2024-12-03 DIAGNOSIS — M62.89 QUADRICEP TIGHTNESS: ICD-10-CM

## 2024-12-03 DIAGNOSIS — G89.29 CHRONIC RIGHT-SIDED LOW BACK PAIN WITHOUT SCIATICA: ICD-10-CM

## 2024-12-03 DIAGNOSIS — M43.6 NECK STIFFNESS: ICD-10-CM

## 2024-12-03 DIAGNOSIS — M54.2 NECK PAIN: Primary | ICD-10-CM

## 2024-12-03 DIAGNOSIS — R29.898 WEAKNESS OF BOTH HIPS: ICD-10-CM

## 2024-12-03 DIAGNOSIS — M62.81 WEAKNESS OF TRUNK MUSCULATURE: ICD-10-CM

## 2024-12-03 PROCEDURE — 97140 MANUAL THERAPY 1/> REGIONS: CPT | Mod: PN,CQ

## 2024-12-03 PROCEDURE — 97110 THERAPEUTIC EXERCISES: CPT | Mod: PN,CQ

## 2024-12-03 NOTE — PROGRESS NOTES
[facility]   Physical Therapy Treatment Note      Name: Glendy Andrade  Clinic Number: [id number]    Therapy Diagnosis:   Encounter Diagnoses   Name Primary?    Neck pain Yes    Back stiffness     Neck stiffness     Chronic right-sided low back pain without sciatica     Weakness of trunk musculature     Weakness of both hips     Quadricep tightness        Physician: Ganesh Long MD    Visit Date: 12/3/2024    Physician Orders: PT Eval and Treat   Medical Diagnosis from Referral: Cervical spinal stenosis [M48.02], Lumbar disc herniation [M51.26]   Evaluation Date: 6/4/2024  Authorization Period Expiration: 12/31/2024  Plan of Care Expiration: 12/31/2024  Progress Note Due: 12/31/2024 (Last Progress Note 11/25/2024, 10/31/2024, 8/2/2024, 7/12/2024)  Visit # / Visits authorized: 24 / 32 +eval  FOTO: Issued Visit #: 1/3, (capture on visit 1, 5, 10) first on 6/4/2024     PTA Visit #: 1/5     Precautions: Standard, ADD, depression, history of MRSA infection, inflammatory spondylopathy, DISH (diffuse idiopathic skeletal hyperostosis), insomnia, possible diagnosis of psoriatic arthritis and autoimmune disorder,  MRI which shows cervical stenosis, possible cord signal change (possible need for decompression and fusion surgery).  right L4/5 protrusion in addition to canal stenosis at that level      Time In:  9:05  Time Out:  9:50  Total Time: 45 minutes  Total Billable Time: 25 minutes      Subjective     Patient reports: some increased stiffness along the right sided thoracic spine, primarily near the scapular but also down toward the right hip. Had a very busy thanksgiving weekend and is more sore from that as well.     She was limited compliant with home exercise program.  Response to previous treatment:  felt better  Functional change:  doing everything but just having less pain, not hesitating or thinking about it as much    Pain: 4/10 pain Right side of ribs and neck.    0/10 in low  back  Location: occiput, cerv paraspinals, UT, levator scap    Objective        Posture/alignment:    Sitting:  with mildly slouched posture, increased forward head posture.  Right scapula lower vs Left with Right Sidebending of trunk.     Standing:  with forward head posture, Right scapula and iliac crest resting lower vs Left side. (Moderately better)     Supine:  Right sided pelvic downslip with AI.  ~ 6 mm Right leg length discrepancy.     Gait:  walking with no obvious antalgic gait.  Step down sign Right Lower Extremity into Right stance phase due to Right leg length discrepancy.     Cervical AROM:           Flexion                                           44 was 38   Extension                      WNL (decreased mid to upper thoracic)      Left      Right       Rotation  70  70   Sidebending  28  28 was 25      Cervical Strength:          Flexion                       5-/5   Extension                       5-/5      Left  Right   Rotation  5-/5 was 4/5  5-/5 was 4/5   Sidebending  5-/5  5-/5      Shoulder ROM:    Left AROM  Right AROM    Flexion  WNL  WNL   Abduction  WNL  WNL   (Restricted Right scapula rotation, rib and scap-thoracic restrictions)     Lumbar ROM:     AROM    Flexion  WNL   Extension  WNL   Left Sidebending  14 was 10   Right Sidebending  15 was 10      Special Tests:     Cervical Radiculopathy     Left  Right   Spurling's Test (A-sidebend) negative    Positive (mild now)      Cervical:     (+)/(-)   Compression negative      Distraction negative         Dermatomes:  upper and lower quarters intact to light touch     Myotomes:    5/5 Bilateral T1 but still 5-/5 C7, (was 5-/5 Bilateral C7 and T1)  Otherwise upper and lower quarter 5/5      strength:  Right=  46 lbs    Left=  42 was 40 lbs     Joint Mobility:    Cervical:  some mid but mostly lower cervical, CTJ, and upper thoracic restrictions  Thoracic motion/mobility, scap-thoracic, ribs:  gross stiffness in thoracic region     Hip  range of motion/mobility:  Pt positioned in sidelying with hip extension mobility/flexibility R= -7 was -15, L= neutral was -3.                                                       Left                              R  Hip External Rotation:             5-/5                             5-/5  Hip abduction:                         4/5 was 4-/5                4-/5 was 3+/5  Hip extension:                         4/5 was 3+/5                4/5 was 4-/5     Flexibility:  Decreased flexibility noted Right>Left rectus femoris, vastus lateralis, tensor fascia latae, Iliotibial Band.  Right Quadratus Lumborum, long thoracolumbar paravertebral muscles.  Pecs, suboccipitals.      Treatment     Glendy received the treatments listed below:      manual therapy techniques: Joint mobilizations, Myofacial release, and Soft tissue Mobilization were applied to the: neck and low back for 15 minutes, including:  Scapular mobs  STM trapezius, levator scap, rhomboids bilaterally  Suboccipital release, STM along cervical paraspinals  Education on self treatment with tennis balls in a sock for suboccipital release  NP--supine mobilization of lower cervical, side glides and down glides  Prone CTJ and upper T-spine P-A mobs (mixed some AROM neck extension to assess mobility)    Below NOT performed:  -Supine and sidelying trigger point and IASTM release of Right>Left rectus femoris, vastus lateralis, tensor fascia latae, Iliotibial Band with intermittent stretching  -Supine muscle energy technique correction of Right sided pelvic downslip with AI f/b shotgun technique  -sidelying opening technique to Right Quadratus Lumborum and longer paravertebral muscles with trigger point release  -rib area of greatest restriction (step forward on Left side, back on Right side) started on Left and then progressed to Right  - thoracic Sidebending   STM along B UT cervical paraspinals, suboccipital release  1st rib mobilization (gentle) bilaterally    NOT  "PERFORMED--Dry needling:  The pt expressed desire to receive trigger point dry needling today. Informed and written consent was obtained including benefits and risks of treatments, copy of signed consent form will be placed in patient's medical record. Verification of pt name, , and site of treatment was performed prior to treatment. Pt was positioned in sidelying to receive trigger point dry needling to Right Quadratus Lumborum with 75 mm needles and lumbar paravertebral muscles L3 and L5 with 40 mm serin J type needles. LTR achieved and pt reported familiar pain reproduction. Electro dry needling x 10 minutes.  The pt did not have adverse response to treatment. This was followed by STM to further decrease trigger point activity and pain, patient tolerated well. Post treatment patient demonstrated decreased pain per report following. Pt was educated to drink plenty of water following treatment to help with muscle soreness. Pt was educated to contact therapist post treatment as needed.     MHP and VMS NMES to Right Quadratus Lumborum, latissimus dorsi, lower trap, rhomboids x 00'     therapeutic exercises to develop strength, endurance, ROM, flexibility, posture, and core stabilization for  30 minutes including:  Kneeling child's pose and progression - verbal review - verbal review  Standing child's pose and progression  Thoracic extension foam rolling  Pec stretching with foam roll with hands behind head  Snow ayla with foam roll   NP--Supine double knee to chest x 5  NP--Supine fig 4 knees to chest x 5 each   NP--Supine single leg lower trunk rotation x 5, Right Lower Extremity only  Sidelying open book (hand on chest) 15 x 5" each side  Prone on elbows with chin tuck 10 x 5"  - prone scap retract 10 x 5" bilateral  - prone scap retract + W lift, 10 x 5" hold   Prone rocketman 3" hold x 10  NP--Prone rocketman fly  Supine on towel--hands behind head pec stretch + chin tuck (fingers assist with subocc release) " "5" hold x 10 reps    BELOW NOT PERFORMED  (NP) Self IASTM release to Right>Left thigh  Self muscle energy technique (Push/pull without stick) correction of Right sided pelvic downslip with AI  Sidelying Quadratus Lumborum + lat stretch with opening technique x 2'  Supine cervical nod 10 x 5" - reviewed use of tennis balls for home  Supine cervical chin tuck + neck flexion hold (for deep neck flexor strength) 10 x 5-10s  (extremely challenging)  Thoracic extension + pec stretch with mini snow ayla x 20    Standing with back to wall  Upright posture with chin tuck  Upright posture + chin tuck + shoulder abduction    neuromuscular re-education activities to improve: Coordination, Kinesthetic, Proprioception, Posture, and Motor Control for 00 minutes. The following activities were included:  Prone:  Rocketman + chin tuck    BELOW NOT PERFORMED  Lumbar:   PPT 10 x 5", mod cues initially for proper activation  PPT + ball squeeze 10 x 5"  PPT + glut set into bridges 2 x 10  PPT +  clam with G loop x 20  PPT + clam with G loop into bridges     therapeutic activities to improve functional performance for 00 minutes, including:  Education on use of cervical roll along pillow edge for neck support  Anatomy and postural education on proper neck alignment at rest and with functional activities to avoid over straining and poor postural mechanics   May add at later date      Patient Education and Home Exercises       Education provided:   - avoidance of painful movements as able  - consistent work of stretching, exercises, and HEP  - increasing postural awareness throughout day with tasks and activities     Written Home Exercises Provided:  Instructed patient to continue current home exercise program.  Exercises were reviewed and Glendy was able to demonstrate them prior to the end of the session.  Glendy demonstrated good  understanding of the education provided. See Electronic Medical Record under Patient Instructions for " exercises provided during therapy sessions    Assessment     Pt continues with stiffness into the lower cervical and mid/upper thoracic spine, loosened some with manual techniques and further with mobility exercises. Able to complete additional thoracic rotations with open books, tolerated well.  She would benefit from PT to help get back on track and provide some relief of her c/o.     Glendy Is progressing well towards her goals.   Patient prognosis is Good.     Patient will continue to benefit from skilled outpatient physical therapy to address the deficits listed in the problem list box on initial evaluation, provide pt/family education and to maximize pt's level of independence in the home and community environment.     Patient's spiritual, cultural and educational needs considered and pt agreeable to plan of care and goals.     Anticipated Barriers for therapy: Schedule conflicts, transportation, pain, guarding, tolerance, endurance, posture, postural awareness, joint and soft tissue deficits, chronicity of neck and low back pain.    Goals:   Short-Term: 4 weeks (11/29/2024)      met  Pt will improve Active cervical flexion to > or = 40* to promote return to prior functional status.  Pt will improve Active cervical side bending to > or = 28* to promote return to prior functional status.  Pt will improve Active lumbar Sidebending  to > or = 12* to promote return to prior functional status.  Pt will demonstrate independence with self correction of pelvic alignment and when to perform to reduce pain and  to promote return to prior functional status.  Pt will demonstrate independence with TOS and ULTT stretches and when to perform to reduce pain and  to promote return to prior functional status.  Pt will improve flexibility of Bilateral  quads, hip flexors, gluts, piriformis, pecs, periscapular, cervical and trunk muscles to help promoted better mobility, posture, alignment, and help return to prior functional  status.  Pt will demonstrate improved postural habits as evidenced by improved pelvic alignment,  improved thoracic extension with less forward head posturing, good pelvic alignment with use of heel lift.  Pt will decrease pain levels < or = to 4-5/10 to hep promote appropriate progression of PT, HEP, and ADL's    Pt will be compliant with new home exercise program to assist with PT intervention and help allow appropriate progression through plan of care.     Long-Term: 10 weeks (12/31/2024)       ongoing  Pt will improve low back, thoracic mobility to WFL to allow return to normal activities of daily living.  Pt will improve cervical strength to > or = 5/5 to allow performance of activities of daily living.  Pt will improve pelvic, trunk and scap-thoracic strength/stabilization to > or = to 4/5 to allow better posture, alignment, and help return to prior functional status.  Pt will have > or = to good flexibility of quads, hip flexors, pecs, neck and periscapula muscles to help promoted better mobility, posture, alignment, and help return to prior functional status.  Pt will demonstrate good postural habits as evidenced by good overall posture and alignment to improve QOL and allow return to prior functional status.  Pt will report < or = 3/10 pain during activities of daily living to improve QOL and allow return to prior functional status.  Pt will be compliant and independent with HEP to allow and maintain better participation in normal activities  Pt will be able to resume normals ADL's, IADL's, previous activities, fitness, and work related tasks     Plan     Continue with: Plan of care Certification: 10/31/2024 to 12/31/2024.    Continue with current POC toward established physical therapy goals.    Aissatou Hodgson, PTA

## 2025-01-09 ENCOUNTER — OFFICE VISIT (OUTPATIENT)
Dept: NEUROSURGERY | Facility: CLINIC | Age: 60
End: 2025-01-09
Payer: COMMERCIAL

## 2025-01-09 VITALS
SYSTOLIC BLOOD PRESSURE: 116 MMHG | BODY MASS INDEX: 22.07 KG/M2 | WEIGHT: 119.94 LBS | HEART RATE: 81 BPM | RESPIRATION RATE: 18 BRPM | DIASTOLIC BLOOD PRESSURE: 76 MMHG | HEIGHT: 62 IN

## 2025-01-09 DIAGNOSIS — M48.02 CERVICAL SPINAL STENOSIS: Primary | ICD-10-CM

## 2025-01-09 DIAGNOSIS — M51.26 LUMBAR DISC HERNIATION: ICD-10-CM

## 2025-01-09 PROCEDURE — 1159F MED LIST DOCD IN RCRD: CPT | Mod: CPTII,S$GLB,, | Performed by: STUDENT IN AN ORGANIZED HEALTH CARE EDUCATION/TRAINING PROGRAM

## 2025-01-09 PROCEDURE — 3008F BODY MASS INDEX DOCD: CPT | Mod: CPTII,S$GLB,, | Performed by: STUDENT IN AN ORGANIZED HEALTH CARE EDUCATION/TRAINING PROGRAM

## 2025-01-09 PROCEDURE — 3078F DIAST BP <80 MM HG: CPT | Mod: CPTII,S$GLB,, | Performed by: STUDENT IN AN ORGANIZED HEALTH CARE EDUCATION/TRAINING PROGRAM

## 2025-01-09 PROCEDURE — 99213 OFFICE O/P EST LOW 20 MIN: CPT | Mod: S$GLB,,, | Performed by: STUDENT IN AN ORGANIZED HEALTH CARE EDUCATION/TRAINING PROGRAM

## 2025-01-09 PROCEDURE — 3074F SYST BP LT 130 MM HG: CPT | Mod: CPTII,S$GLB,, | Performed by: STUDENT IN AN ORGANIZED HEALTH CARE EDUCATION/TRAINING PROGRAM

## 2025-01-09 NOTE — PROGRESS NOTES
[facility] Neurosurgery [facility]  Clinic Consult     Consult Requested By: No ref. provider found  PCP: Jillian Rosen MD    SUBJECTIVE:     Chief Complaint:   Chief Complaint   Patient presents with    Follow-up     6 mo f/u after PT     Returns today with no symptoms  She had a great with physical therapy  She is working with great dexterity  She has continued her physical exercises every night which she feels great benefits  Denies any symptoms currently        LV  She went on vacation she is actually feeling much better  She has worked with physical therapy dry needling has worked very well for her  She denies any current symptoms  She is showing without issue denies any dexterity changes      History of Present Illness:  Glendy Andrade is a 59 y.o. female with HLD and DISH who presents for spinal evaluation. Patient reports diagnosis of DISH approximately 5 years ago. She has managed her neck and back pain conservatively. She reports worsening of neck pain with radiation into the arms to the hands. She reports numbness/tingling. She denies dropping objects or significant difficulties with hand dexterity. She also complains of low back pain with radiation into the right leg.     Pertinent and recent history, provider evaluations, imaging and data reviewed in EPIC        Past Medical History:   Diagnosis Date    ADD (attention deficit disorder)     Depression     DISH (diffuse idiopathic skeletal hyperostosis)     High risk HPV infection     History of MRSA infection     Hyperlipidemia     Inflammatory spondylopathy, unspecified spinal region 03/08/2023    Insomnia     Nephrolithiasis 09/29/2023    Records @ [place]; CT abd/pelvis w/o contrast w/ 2 mm distal right ureteral stone w/o hydronephrosis; resolved w/o complication; cleared by Urology f/u outpatient    Personal history of colonic polyps 06/15/2023    PMB (postmenopausal bleeding)     PONV (postoperative nausea and vomiting)      Thyroid nodule      Past Surgical History:   Procedure Laterality Date    BREAST BIOPSY Left     benign    HYSTEROSCOPIC POLYPECTOMY OF UTERUS N/A 2024    Procedure: POLYPECTOMY, UTERUS, HYSTEROSCOPIC;  Surgeon: Sharla Zavala MD;  Location: [facility];  Service: OB/GYN;  Laterality: N/A;  with myosure    HYSTEROSCOPY WITH DILATION AND CURETTAGE OF UTERUS N/A 2024    Procedure: HYSTEROSCOPY, WITH DILATION AND CURETTAGE OF UTERUS;  Surgeon: Sharla Zavala MD;  Location: [facility];  Service: OB/GYN;  Laterality: N/A;    THYROIDECTOMY      TONSILLECTOMY      TOTAL REDUCTION MAMMOPLASTY Bilateral     breast lift    tummy nelson       Family History   Problem Relation Name Age of Onset    Diabetes Paternal Grandmother      Arthritis Maternal Grandmother      Diabetes Maternal Grandmother      Hypertension Father      Breast cancer Neg Hx      Ovarian cancer Neg Hx      Uterine cancer Neg Hx      Colon cancer Neg Hx       Social History     Tobacco Use    Smoking status: Former     Current packs/day: 0.00     Types: Cigarettes     Quit date: 3/19/2016     Years since quittin.8    Smokeless tobacco: Never   Substance Use Topics    Alcohol use: Yes     Alcohol/week: 7.0 standard drinks of alcohol     Types: 7 Glasses of wine per week     Comment: occ    Drug use: No      Review of patient's allergies indicates:  No Known Allergies    Current Outpatient Medications:     cyanocobalamin (VITAMIN B-12) 1000 MCG tablet, Take 100 mcg by mouth once daily., Disp: , Rfl:     estradiol 0.05 mg/24 hr td ptsw (VIVELLE-DOT) 0.05 mg/24 hr, Place 1 patch onto the skin twice a week., Disp: 8 patch, Rfl: 11    ibuprofen (ADVIL,MOTRIN) 800 MG tablet, Take 1 tablet (800 mg total) by mouth every 8 (eight) hours as needed for Pain., Disp: 30 tablet, Rfl: 0    multivitamin (THERAGRAN) per tablet, Take 1 tablet by mouth once daily., Disp: , Rfl:     mupirocin (BACTROBAN) 2 % ointment, Apply topically., Disp: , Rfl:      "ondansetron (ZOFRAN) 8 MG tablet, , Disp: , Rfl:     progesterone (PROMETRIUM) 100 MG capsule, Take 1 capsule (100 mg total) by mouth nightly., Disp: 30 capsule, Rfl: 11    vitamin D (VITAMIN D3) 1000 units Tab, Take 1,000 Units by mouth once daily., Disp: , Rfl:     baclofen (LIORESAL) 10 MG tablet, Take 1 tablet (10 mg total) by mouth 2 (two) times daily as needed (muscle spasms)., Disp: 90 tablet, Rfl: 1    traMADoL (ULTRAM) 50 mg tablet, Take 1 tablet (50 mg total) by mouth 3 (three) times daily as needed for Pain., Disp: 15 tablet, Rfl: 0    VYVANSE 30 mg capsule, Take 30 mg by mouth daily as needed., Disp: , Rfl:     Review of Systems:   Constitutional: no fever, chills or night sweats. No changes in weight   Eyes: no visual changes   ENT: no nasal congestion or sore throat   Respiratory: no cough or shortness of breath   Cardiovascular: no chest pain or palpitations   Gastrointestinal: no nausea or vomiting   Genitourinary: no hematuria or dysuria   Integument/Breast: no rash or pruritis   Hematologic/Lymphatic: no easy bruising or lymphadenopathy   Musculoskeletal: +neck pain, back pain  Neurological: no seizures or tremors +paresthesias   Behavioral/Psych: no auditory or visual hallucinations   Endocrine: no heat or cold intolerance         OBJECTIVE:     Vital Signs (Most Recent):  Pulse: 81 (01/09/25 1436)  Resp: 18 (01/09/25 1436)  BP: 116/76 (01/09/25 1436)  Estimated body mass index is 21.94 kg/m² as calculated from the following:    Height as of this encounter: 5' 2" (1.575 m).    Weight as of this encounter: 54.4 kg (119 lb 14.9 oz).    Physical Exam:   General: well developed, well nourished, no distress   Neurologic: Alert and oriented. Thought content appropriate. GCS 15.   Head: normocephalic, atraumatic  Eyes: EOMI  Neck: trachea midline, no JVD   Cardiovascular: no LE edema  Pulmonary: normal respirations, no signs of respiratory distress  Abdomen: non-distended  Sensory: intact to light touch " throughout  Skin: Skin is warm, dry and intact    Motor Strength: Moves all extremities spontaneously with good tone. No abnormal movements seen.       Deltoids Triceps Biceps Wrist Extension Wrist Flexion Hand  Interossei   Upper: R 5/5 5/5 5/5 5/5 5/5 5/5 5/5    L 5/5 5/5 5/5 5/5 5/5 5/5 5/5     Iliopsoas Quadriceps Knee  Flexion Tibialis  anterior Gastro- cnemius EHL    Lower: R 5/5 5/5 5/5 5/5 5/5 5/5     L 5/5 5/5 5/5 5/5 5/5 5/5      DTR's: 2 +   Burton: absent  Clonus: absent    Gait: normal            Diagnostic Results:  I have independently reviewed the following imaging:  MRI cervical/thoracic/lumbar  ALIGNMENT: Straightening of the normal cervical lordosis.  No spondylolisthesis.     BONE: No compression fractures.  Multiple fat containing lesions, the largest at L3, compatible with hemangiomas.  No aggressive marrow replacing lesion.     JOINT: Multilevel degenerative changes with disc desiccation, disc height loss, and facet arthropathy.  There are large anterior osteophytes extending from C3-7.  No ankylosis or erosions.  No bone marrow edema.     SPINAL CANAL: Subtle T2 hyperintense signal in the cervical cord at the C6-7 level.  The thoracic spinal cord is unremarkable.  The conus medullaris has a normal appearance and terminates at the L1 level.  Cauda equina nerve roots are unremarkable.  No mass or collection.     POSTERIOR FOSSA: Unremarkable.     PARASPINAL SOFT TISSUES: Unremarkable.     SIGNIFICANT FINDINGS BY LEVEL:     C2-3: Unremarkable.     C3-4: Small disc osteophyte complex.  No canal stenosis.  Mild left foraminal stenosis.     C4-5: Disc osteophyte complex, eccentric to the right.  Mild canal stenosis.  Severe right and moderate left foraminal stenosis.     C5-6: Disc osteophyte complex.  Mild canal stenosis.  Moderate bilateral foraminal stenosis.     C6-7: Disc osteophyte complex.  Moderate canal stenosis with near complete effacement of the thecal sac.  Severe bilateral  foraminal stenosis.     C7-T1: Disc osteophyte complex.  Mild canal stenosis.  Mild bilateral foraminal stenosis.     Thoracic spine: No focal disc abnormality.  Facet arthropathy resulting in mild canal stenosis at T10-11.  No significant foraminal stenosis.     T12-L1: Right facet arthropathy and ligamentum flavum thickening.  Mild canal stenosis.  Mild right foraminal stenosis.     L1-2: Unremarkable.     L2-3: Disc bulge.  No canal stenosis.  Mild bilateral foraminal stenosis.     L3-4: Disc bulge.  Bilateral facet arthropathy and ligamentum flavum thickening.  Mild canal stenosis.  Mild bilateral foraminal stenosis.     L4-5: Disc bulge.  Superimposed right paracentral extrusion with caudal migration to the L5 pedicle level.  Advanced facet arthropathy and ligamentum flavum thickening bilaterally.  Moderate canal stenosis.  Herniated disc abuts the right descending L5 and S1 nerve roots.  There is abutment of the left descending L5 nerve root as well in the lateral recess.  Severe bilateral foraminal stenosis.     L5-S1: Disc bulge.  Superimposed central protrusion.  Mild canal stenosis.  Herniated disc abuts the descending S1 nerve roots bilaterally.  Mild left foraminal stenosis.           ASSESSMENT/PLAN:     There are no diagnoses linked to this encounter.    Glendy Andrade is a 59 y.o. female  She has been worked up for which she considers a history of psoriatic arthritis.  She has had joint pains diffusely some rashes around her joints  Working with rheumatoid arthritis she is further workup for an autoimmune disorder and panel scheduled she states following up in July.    She does have some lower back pain and radiculopathy down the right leg  Which we discussed treatment options for an appropriate referrals    She works as a seamstress.  And has part of this workup she obtained a cervical MRI which shows known multilevel cervical degeneration with stenosis more so at C3-6 but at C C6-7 level there is a  question of some cord signal change  She has an old x-ray with somewhat of a cervical kyphosis multilevel degeneration  Despite all this.  She has very few signs or symptoms of cervical myelopathy.  She has a seamstress she works without an issue.  She denies any new numbness tingling in the extremities.  No new loss of function etc.  Reviewed the complexes the situation with the radiographic findings without significant clinical consequences  In this setting surgery is essentially prophylactic with the risk reviewed in detail  She has significant anterior osteophytes and surgery require a posterior cervical decompression instrumented fusion  In the setting of multilevel degeneration stenosis and kyphosis  With all that.  She will obtain a DEXA scan due to question of demineralization on previous x-rays and CT scan  Physical therapy , new x-rays  And will follow up in 8-12 weeks  Any signs and symptoms of progression she has been thoroughly educated we will follow up sooner        Assessment recommendation.    Overall she is doing very well  She has no functional limitations no signs or symptoms of myelopathy  Complex cervical imaging with significant component of additional in the anterior cervical spinal column  Stenosis is reviewed  As well as treatment options  Doing very well we will continue with surveillance and six-month clinical follow up    With regard to her lower back she is managing well with activity modification physical therapy would like to continue dry needling  We did discuss should she have persistent symptoms the possibility of a consultation with a nonoperative team at interventional spine/pain management  She will continue to manage conservatively at this time  Signs symptoms needing re-evaluation reviewed in detail            Assessment recommendation today  Did great with physical therapy and time  No residual symptoms  No signs or symptoms of myelopathy or radiculopathy  Working well  Signs  symptoms needing medical evaluation reviewed in detail  Otherwise 1 year surveillance            Patient verbalized understanding of plan. Encouraged to call with any questions or concerns.     This note was partially dictated using voice recognition software, so please excuse any errors that were not corrected.

## 2025-01-14 ENCOUNTER — OFFICE VISIT (OUTPATIENT)
Dept: FAMILY MEDICINE | Facility: CLINIC | Age: 60
End: 2025-01-14
Payer: COMMERCIAL

## 2025-01-14 VITALS
SYSTOLIC BLOOD PRESSURE: 120 MMHG | WEIGHT: 123.69 LBS | BODY MASS INDEX: 22.76 KG/M2 | TEMPERATURE: 98 F | RESPIRATION RATE: 16 BRPM | HEART RATE: 83 BPM | HEIGHT: 62 IN | OXYGEN SATURATION: 99 % | DIASTOLIC BLOOD PRESSURE: 80 MMHG

## 2025-01-14 DIAGNOSIS — Z00.00 PREVENTATIVE HEALTH CARE: Primary | ICD-10-CM

## 2025-01-14 DIAGNOSIS — Z13.1 ENCOUNTER FOR SCREENING FOR DIABETES MELLITUS: ICD-10-CM

## 2025-01-14 DIAGNOSIS — E89.0 H/O PARTIAL THYROIDECTOMY: Chronic | ICD-10-CM

## 2025-01-14 DIAGNOSIS — E78.2 MODERATE MIXED HYPERLIPIDEMIA NOT REQUIRING STATIN THERAPY: Chronic | ICD-10-CM

## 2025-01-14 PROBLEM — Z98.890 STATUS POST HYSTEROSCOPIC POLYPECTOMY: Chronic | Status: ACTIVE | Noted: 2024-05-24

## 2025-01-14 PROCEDURE — 99999 PR PBB SHADOW E&M-EST. PATIENT-LVL IV: CPT | Mod: PBBFAC,,, | Performed by: STUDENT IN AN ORGANIZED HEALTH CARE EDUCATION/TRAINING PROGRAM

## 2025-01-14 PROCEDURE — 99214 OFFICE O/P EST MOD 30 MIN: CPT | Mod: 25,S$GLB,, | Performed by: STUDENT IN AN ORGANIZED HEALTH CARE EDUCATION/TRAINING PROGRAM

## 2025-01-14 PROCEDURE — 3008F BODY MASS INDEX DOCD: CPT | Mod: CPTII,S$GLB,, | Performed by: STUDENT IN AN ORGANIZED HEALTH CARE EDUCATION/TRAINING PROGRAM

## 2025-01-14 PROCEDURE — 99396 PREV VISIT EST AGE 40-64: CPT | Mod: S$GLB,,, | Performed by: STUDENT IN AN ORGANIZED HEALTH CARE EDUCATION/TRAINING PROGRAM

## 2025-01-14 PROCEDURE — 3079F DIAST BP 80-89 MM HG: CPT | Mod: CPTII,S$GLB,, | Performed by: STUDENT IN AN ORGANIZED HEALTH CARE EDUCATION/TRAINING PROGRAM

## 2025-01-14 PROCEDURE — 1160F RVW MEDS BY RX/DR IN RCRD: CPT | Mod: CPTII,S$GLB,, | Performed by: STUDENT IN AN ORGANIZED HEALTH CARE EDUCATION/TRAINING PROGRAM

## 2025-01-14 PROCEDURE — 1159F MED LIST DOCD IN RCRD: CPT | Mod: CPTII,S$GLB,, | Performed by: STUDENT IN AN ORGANIZED HEALTH CARE EDUCATION/TRAINING PROGRAM

## 2025-01-14 PROCEDURE — 3074F SYST BP LT 130 MM HG: CPT | Mod: CPTII,S$GLB,, | Performed by: STUDENT IN AN ORGANIZED HEALTH CARE EDUCATION/TRAINING PROGRAM

## 2025-01-14 RX ORDER — DEXTROAMPHETAMINE SACCHARATE, AMPHETAMINE ASPARTATE, DEXTROAMPHETAMINE SULFATE AND AMPHETAMINE SULFATE 1.25; 1.25; 1.25; 1.25 MG/1; MG/1; MG/1; MG/1
1 TABLET ORAL 2 TIMES DAILY
COMMUNITY
Start: 2025-01-03

## 2025-01-14 NOTE — PROGRESS NOTES
Plan:      Glendy was seen today for annual exam.    Diagnoses and all orders for this visit:    Preventative health care: Discussed age appropriate preventative healthcare items such as cancer screenings and recommended immunizations. Discussed whether patient is using tobacco, alcohol, or illicit drugs and any concerns were discussed. Discussed maintenance of a healthy weight. Patient queried if she has any additional questions about preventative healthcare and all questions were answered.  -     Hemoglobin A1C; Future  -     Lipid Panel; Future  -     Comprehensive Metabolic Panel; Future  -     CBC Auto Differential; Future  -     Vitamin D; Future  -     Vitamin B12; Future    Encounter for screening for diabetes mellitus  -     Hemoglobin A1C; Future  -     Comprehensive Metabolic Panel; Future    Moderate mixed hyperlipidemia not requiring statin therapy  -     Lipid Panel; Future    H/O partial thyroidectomy  -     TSH; Future      Follow up in about 1 year (around 1/14/2026).    Jillian Rosen MD  01/14/2025    Subjective:      Patient ID: Glendy Andrade is a 59 y.o. female    Chief Complaint   Patient presents with    Annual Exam     HPI  59 y.o. female with a PMHx as documented below presents to clinic today for the following:    Routine follow-up/annual exam. No specific concerns at this time. No refills needed at this time.     ADHD:   - Adderall 5 mg BID      HLD:   - Lipid panel (3/8/23) w/ elevated total cholesterol 270, triglycerides wnl 86, elevated HDL 84, elevated .8  - Previous Rx: Crestor 10 mg daily (no reported side effects)     DISH:   - Managed w/ OTC anti-inflammatories and stretching/yoga  - Baclofen 10 mg BID PRN    Post-menopausal HRT:   - Estradiol 0.05 mg/24 hr - 1 patch twice weekly  - Prometrium 100 mg qhs    ROS  Constitutional:  Negative for chills and fever.   Respiratory:  Negative for shortness of breath.    Cardiovascular:  Negative for chest pain.   Gastrointestinal:   "Negative for abdominal pain, constipation, diarrhea, nausea and vomiting.     Current Outpatient Medications   Medication Instructions    baclofen (LIORESAL) 10 mg, Oral, 2 times daily PRN    cyanocobalamin (VITAMIN B-12) 100 mcg, Daily    dextroamphetamine-amphetamine 5 mg Tab 1 tablet, 2 times daily    estradiol 0.05 mg/24 hr td ptsw (VIVELLE-DOT) 0.05 mg/24 hr 1 patch, Transdermal, Twice weekly    ibuprofen (ADVIL,MOTRIN) 800 mg, Oral, Every 8 hours PRN    multivitamin (THERAGRAN) per tablet 1 tablet, Daily    progesterone (PROMETRIUM) 100 mg, Oral, Nightly    vitamin D (VITAMIN D3) 1,000 Units, Daily      Past Medical History:   Diagnosis Date    ADD (attention deficit disorder)     Depression     DISH (diffuse idiopathic skeletal hyperostosis)     High risk HPV infection     History of MRSA infection     Hyperlipidemia     Inflammatory spondylopathy, unspecified spinal region 03/08/2023    Insomnia     Nephrolithiasis 09/29/2023    Records @ [place]; CT abd/pelvis w/o contrast w/ 2 mm distal right ureteral stone w/o hydronephrosis; resolved w/o complication; cleared by Urology f/u outpatient    Personal history of colonic polyps 06/15/2023    PMB (postmenopausal bleeding)     PONV (postoperative nausea and vomiting)     Thyroid nodule       Objective:      Vitals:    01/14/25 1352   BP: 120/80   Pulse: 83   Resp: 16   Temp: 97.9 °F (36.6 °C)   TempSrc: Oral   SpO2: 99%   Weight: 56.1 kg (123 lb 10.9 oz)   Height: 5' 2" (1.575 m)     Body mass index is 22.62 kg/m².    Physical Exam   Constitutional:       General: No acute distress.  HENT:      Head: Normocephalic and atraumatic.   Pulmonary:      Effort: Pulmonary effort is normal. No respiratory distress.   Neurological:      General: No focal deficit present.      Mental Status: Alert and oriented to person, place, and time. Mental status is at baseline.    Assessment:       1. Preventative health care    2. Encounter for screening for diabetes mellitus  "   3. Moderate mixed hyperlipidemia not requiring statin therapy    4. H/O partial thyroidectomy        Jillian Rosen MD  [facility]  Office: [phone]   Fax: [phone]  01/14/2025      Disclaimer: This note was partly generated using dictation software which may occasionally result in transcription errors.    Total time spent on this encounter includes face to face time and non-face to face time preparing to see the patient (eg, review of tests), obtaining and/or reviewing separately obtained history, documenting clinical information in the electronic or other health record, independently interpreting results, and communicating results to the patient/family/caregiver, or care coordinator.

## 2025-02-20 ENCOUNTER — LAB VISIT (OUTPATIENT)
Dept: LAB | Facility: HOSPITAL | Age: 60
End: 2025-02-20
Payer: COMMERCIAL

## 2025-02-20 DIAGNOSIS — Z00.00 PREVENTATIVE HEALTH CARE: ICD-10-CM

## 2025-02-20 DIAGNOSIS — Z13.1 ENCOUNTER FOR SCREENING FOR DIABETES MELLITUS: ICD-10-CM

## 2025-02-20 DIAGNOSIS — E78.2 MODERATE MIXED HYPERLIPIDEMIA NOT REQUIRING STATIN THERAPY: Chronic | ICD-10-CM

## 2025-02-20 DIAGNOSIS — E89.0 H/O PARTIAL THYROIDECTOMY: Chronic | ICD-10-CM

## 2025-02-20 LAB
25(OH)D3+25(OH)D2 SERPL-MCNC: 39 NG/ML (ref 30–96)
ALBUMIN SERPL BCP-MCNC: 4.2 G/DL (ref 3.5–5.2)
ALP SERPL-CCNC: 54 U/L (ref 40–150)
ALT SERPL W/O P-5'-P-CCNC: 30 U/L (ref 10–44)
ANION GAP SERPL CALC-SCNC: 8 MMOL/L (ref 8–16)
AST SERPL-CCNC: 36 U/L (ref 10–40)
BASOPHILS # BLD AUTO: 0.02 K/UL (ref 0–0.2)
BASOPHILS NFR BLD: 0.4 % (ref 0–1.9)
BILIRUB SERPL-MCNC: 0.8 MG/DL (ref 0.1–1)
BUN SERPL-MCNC: 14 MG/DL (ref 8–23)
CALCIUM SERPL-MCNC: 9.2 MG/DL (ref 8.7–10.5)
CHLORIDE SERPL-SCNC: 105 MMOL/L (ref 95–110)
CHOLEST SERPL-MCNC: 247 MG/DL (ref 120–199)
CHOLEST/HDLC SERPL: 2.8 {RATIO} (ref 2–5)
CO2 SERPL-SCNC: 25 MMOL/L (ref 23–29)
CREAT SERPL-MCNC: 0.8 MG/DL (ref 0.5–1.4)
DIFFERENTIAL METHOD BLD: ABNORMAL
EOSINOPHIL # BLD AUTO: 0.1 K/UL (ref 0–0.5)
EOSINOPHIL NFR BLD: 1.5 % (ref 0–8)
ERYTHROCYTE [DISTWIDTH] IN BLOOD BY AUTOMATED COUNT: 13.5 % (ref 11.5–14.5)
EST. GFR  (NO RACE VARIABLE): >60 ML/MIN/1.73 M^2
ESTIMATED AVG GLUCOSE: 100 MG/DL (ref 68–131)
GLUCOSE SERPL-MCNC: 86 MG/DL (ref 70–110)
HBA1C MFR BLD: 5.1 % (ref 4–5.6)
HCT VFR BLD AUTO: 44.1 % (ref 37–48.5)
HDLC SERPL-MCNC: 87 MG/DL (ref 40–75)
HDLC SERPL: 35.2 % (ref 20–50)
HGB BLD-MCNC: 14.3 G/DL (ref 12–16)
IMM GRANULOCYTES # BLD AUTO: 0.01 K/UL (ref 0–0.04)
IMM GRANULOCYTES NFR BLD AUTO: 0.2 % (ref 0–0.5)
LDLC SERPL CALC-MCNC: 144.8 MG/DL (ref 63–159)
LYMPHOCYTES # BLD AUTO: 1.6 K/UL (ref 1–4.8)
LYMPHOCYTES NFR BLD: 31.5 % (ref 18–48)
MCH RBC QN AUTO: 31.7 PG (ref 27–31)
MCHC RBC AUTO-ENTMCNC: 32.4 G/DL (ref 32–36)
MCV RBC AUTO: 98 FL (ref 82–98)
MONOCYTES # BLD AUTO: 0.5 K/UL (ref 0.3–1)
MONOCYTES NFR BLD: 8.7 % (ref 4–15)
NEUTROPHILS # BLD AUTO: 3 K/UL (ref 1.8–7.7)
NEUTROPHILS NFR BLD: 57.7 % (ref 38–73)
NONHDLC SERPL-MCNC: 160 MG/DL
NRBC BLD-RTO: 0 /100 WBC
PLATELET # BLD AUTO: 279 K/UL (ref 150–450)
PMV BLD AUTO: 11.3 FL (ref 9.2–12.9)
POTASSIUM SERPL-SCNC: 4.1 MMOL/L (ref 3.5–5.1)
PROT SERPL-MCNC: 7.3 G/DL (ref 6–8.4)
RBC # BLD AUTO: 4.51 M/UL (ref 4–5.4)
SODIUM SERPL-SCNC: 138 MMOL/L (ref 136–145)
TRIGL SERPL-MCNC: 76 MG/DL (ref 30–150)
TSH SERPL DL<=0.005 MIU/L-ACNC: 2.04 UIU/ML (ref 0.4–4)
VIT B12 SERPL-MCNC: 1559 PG/ML (ref 210–950)
WBC # BLD AUTO: 5.2 K/UL (ref 3.9–12.7)

## 2025-02-20 PROCEDURE — 80061 LIPID PANEL: CPT | Performed by: STUDENT IN AN ORGANIZED HEALTH CARE EDUCATION/TRAINING PROGRAM

## 2025-02-20 PROCEDURE — 80053 COMPREHEN METABOLIC PANEL: CPT | Performed by: STUDENT IN AN ORGANIZED HEALTH CARE EDUCATION/TRAINING PROGRAM

## 2025-02-20 PROCEDURE — 82607 VITAMIN B-12: CPT | Performed by: STUDENT IN AN ORGANIZED HEALTH CARE EDUCATION/TRAINING PROGRAM

## 2025-02-20 PROCEDURE — 83036 HEMOGLOBIN GLYCOSYLATED A1C: CPT | Performed by: STUDENT IN AN ORGANIZED HEALTH CARE EDUCATION/TRAINING PROGRAM

## 2025-02-20 PROCEDURE — 36415 COLL VENOUS BLD VENIPUNCTURE: CPT | Mod: PN | Performed by: STUDENT IN AN ORGANIZED HEALTH CARE EDUCATION/TRAINING PROGRAM

## 2025-02-20 PROCEDURE — 85025 COMPLETE CBC W/AUTO DIFF WBC: CPT | Performed by: STUDENT IN AN ORGANIZED HEALTH CARE EDUCATION/TRAINING PROGRAM

## 2025-02-20 PROCEDURE — 82306 VITAMIN D 25 HYDROXY: CPT | Performed by: STUDENT IN AN ORGANIZED HEALTH CARE EDUCATION/TRAINING PROGRAM

## 2025-02-20 PROCEDURE — 84443 ASSAY THYROID STIM HORMONE: CPT | Performed by: STUDENT IN AN ORGANIZED HEALTH CARE EDUCATION/TRAINING PROGRAM

## 2025-02-21 ENCOUNTER — RESULTS FOLLOW-UP (OUTPATIENT)
Dept: FAMILY MEDICINE | Facility: CLINIC | Age: 60
End: 2025-02-21

## 2025-08-09 DIAGNOSIS — Z79.890 HORMONE REPLACEMENT THERAPY (HRT): ICD-10-CM

## 2025-08-09 RX ORDER — ESTRADIOL 0.05 MG/D
FILM, EXTENDED RELEASE TRANSDERMAL
Qty: 24 PATCH | Refills: 0 | Status: SHIPPED | OUTPATIENT
Start: 2025-08-09

## 2025-08-09 NOTE — TELEPHONE ENCOUNTER
Refill Decision Note   Glendy Andrade  is requesting a refill authorization.  Brief Assessment and Rationale for Refill:  Approve     Medication Therapy Plan:       Medication Reconciliation Completed: No   Comments:     No Care Gaps recommended.     Note composed:7:57 AM 08/09/2025

## 2025-09-02 ENCOUNTER — OFFICE VISIT (OUTPATIENT)
Dept: FAMILY MEDICINE | Facility: CLINIC | Age: 60
End: 2025-09-02
Payer: COMMERCIAL

## 2025-09-02 VITALS
DIASTOLIC BLOOD PRESSURE: 80 MMHG | BODY MASS INDEX: 21.86 KG/M2 | OXYGEN SATURATION: 96 % | HEIGHT: 62 IN | SYSTOLIC BLOOD PRESSURE: 108 MMHG | HEART RATE: 82 BPM | WEIGHT: 118.81 LBS

## 2025-09-02 DIAGNOSIS — B96.89 ACUTE BACTERIAL SINUSITIS: ICD-10-CM

## 2025-09-02 DIAGNOSIS — J01.90 ACUTE BACTERIAL SINUSITIS: ICD-10-CM

## 2025-09-02 DIAGNOSIS — R42 VERTIGO: Primary | ICD-10-CM

## 2025-09-02 PROCEDURE — 1159F MED LIST DOCD IN RCRD: CPT | Mod: CPTII,S$GLB,,

## 2025-09-02 PROCEDURE — 3044F HG A1C LEVEL LT 7.0%: CPT | Mod: CPTII,S$GLB,,

## 2025-09-02 PROCEDURE — 3079F DIAST BP 80-89 MM HG: CPT | Mod: CPTII,S$GLB,,

## 2025-09-02 PROCEDURE — 99999 PR PBB SHADOW E&M-EST. PATIENT-LVL IV: CPT | Mod: PBBFAC,,,

## 2025-09-02 PROCEDURE — 3074F SYST BP LT 130 MM HG: CPT | Mod: CPTII,S$GLB,,

## 2025-09-02 PROCEDURE — 99214 OFFICE O/P EST MOD 30 MIN: CPT | Mod: S$GLB,,,

## 2025-09-02 PROCEDURE — 3008F BODY MASS INDEX DOCD: CPT | Mod: CPTII,S$GLB,,

## 2025-09-02 RX ORDER — AMOXICILLIN AND CLAVULANATE POTASSIUM 875; 125 MG/1; MG/1
1 TABLET, FILM COATED ORAL EVERY 12 HOURS
Qty: 14 TABLET | Refills: 0 | Status: SHIPPED | OUTPATIENT
Start: 2025-09-02 | End: 2025-09-09

## 2025-09-02 RX ORDER — MECLIZINE HYDROCHLORIDE 25 MG/1
25 TABLET ORAL 3 TIMES DAILY PRN
Qty: 30 TABLET | Refills: 1 | Status: SHIPPED | OUTPATIENT
Start: 2025-09-02

## 2025-09-02 RX ORDER — ONDANSETRON 4 MG/1
4 TABLET, ORALLY DISINTEGRATING ORAL EVERY 6 HOURS PRN
COMMUNITY
End: 2025-09-02

## 2025-09-04 ENCOUNTER — PATIENT MESSAGE (OUTPATIENT)
Dept: FAMILY MEDICINE | Facility: CLINIC | Age: 60
End: 2025-09-04
Payer: COMMERCIAL

## 2025-09-04 DIAGNOSIS — R42 VERTIGO: Primary | ICD-10-CM

## 2025-09-05 RX ORDER — PREDNISONE 20 MG/1
40 TABLET ORAL DAILY
Qty: 10 TABLET | Refills: 0 | Status: SHIPPED | OUTPATIENT
Start: 2025-09-05 | End: 2025-09-10